# Patient Record
Sex: MALE | Race: BLACK OR AFRICAN AMERICAN | NOT HISPANIC OR LATINO | Employment: FULL TIME | URBAN - METROPOLITAN AREA
[De-identification: names, ages, dates, MRNs, and addresses within clinical notes are randomized per-mention and may not be internally consistent; named-entity substitution may affect disease eponyms.]

---

## 2017-02-01 ENCOUNTER — OFFICE VISIT (OUTPATIENT)
Dept: CARDIOLOGY | Facility: CLINIC | Age: 57
End: 2017-02-01

## 2017-02-01 VITALS
BODY MASS INDEX: 46.65 KG/M2 | WEIGHT: 315 LBS | HEART RATE: 59 BPM | HEIGHT: 69 IN | SYSTOLIC BLOOD PRESSURE: 143 MMHG | DIASTOLIC BLOOD PRESSURE: 94 MMHG

## 2017-02-01 DIAGNOSIS — I10 ESSENTIAL HYPERTENSION: Primary | ICD-10-CM

## 2017-02-01 DIAGNOSIS — Z79.899 HIGH RISK MEDICATION USE: Primary | ICD-10-CM

## 2017-02-01 PROCEDURE — 99213 OFFICE O/P EST LOW 20 MIN: CPT | Performed by: INTERNAL MEDICINE

## 2017-02-01 RX ORDER — HYDROCHLOROTHIAZIDE 25 MG/1
25 TABLET ORAL DAILY
Qty: 90 TABLET | Refills: 2 | Status: SHIPPED | OUTPATIENT
Start: 2017-02-01 | End: 2017-03-15 | Stop reason: SDUPTHER

## 2017-02-01 RX ORDER — LISINOPRIL 40 MG/1
40 TABLET ORAL DAILY
Qty: 90 TABLET | Refills: 3 | Status: SHIPPED | OUTPATIENT
Start: 2017-02-01 | End: 2017-03-15 | Stop reason: SDUPTHER

## 2017-02-01 RX ORDER — TRIAMTERENE AND HYDROCHLOROTHIAZIDE 37.5; 25 MG/1; MG/1
1 TABLET ORAL DAILY
Qty: 90 TABLET | Refills: 2 | Status: SHIPPED | OUTPATIENT
Start: 2017-02-01 | End: 2017-11-10 | Stop reason: SDUPTHER

## 2017-02-01 RX ORDER — VITAMIN B COMPLEX
1 CAPSULE ORAL DAILY
COMMUNITY
End: 2022-10-19

## 2017-02-01 NOTE — PROGRESS NOTES
Tyrell Guzmán  1960  [phone]  [phone]    02/01/2017    ROBRET Avila MD    Chief Complaint   Patient presents with   • Follow-up     edema, htn     PROBLEM LIST:  1. Bilateral lower extremity edema.  2. Abnormal EKG revealing poor anterior R-wave progression, inferior lateral T-wave inversion:  a. Reportedly normal stress test, 2002 -- data deficit.  b. A 2D echocardiogram, 04/28/2008:   Mild LVF with ejection fraction greater than 55%, trace mitral regurgitation, trace tricuspid regurgitation with normal RVSP.  c. Cardiolite GXT, 04/30/2008:  Normal ejection fraction of 66% with no inducible ischemia.  d. Echocardiogram, 9/26/2014: Normal LV function and wall motion. Minimally reduced exercise tolerance.  3. Hypertension.  4. Obesity.  5. Colon cancer:  a. Colectomy, 08/12/2010, Dr. Davis.  b. Chemotherapy x6 months.  6. Surgical history:  a. Vasectomy.  b. Colectomy.      No Known Allergies    Current Medications:      Current Outpatient Prescriptions:   •  B Complex Vitamins (VITAMIN B COMPLEX) capsule capsule, Take  by mouth Daily., Disp: , Rfl:   •  Lisinopril-Hydrochlorothiazide (ZESTORETIC PO), Take  by mouth Daily. Unsure of dosage, Disp: , Rfl:   •  Multiple Vitamin (MULTI VITAMIN PO), Take  by mouth Daily., Disp: , Rfl:     HPI    Mr. Guzmán presents today for follow up for hypertension and bilateral lower extremity edema. Since last visit, patient has been feeling well from a cardiac standpoint. He has been experiencing edema in his ankles. Patient denies chest pain, palpitations, shortness of breath, PND, orthopnea, dizziness, and syncope. He is currently only getting cardio done while at work, walking approximately 4 miles per day.  He has just started an attempt to lose weight.    The following portions of the patient's history were reviewed and updated as appropriate: allergies, current medications and problem list.    Pertinent positives as listed in the HPI.  All other  "systems reviewed are negative.    Vitals:    02/01/17 0948   BP: 143/94   BP Location: Left arm   Patient Position: Sitting   Pulse: 59   Weight: (!) 334 lb (152 kg)   Height: 69\" (175.3 cm)       General: Alert and oriented  Neck: Jugular venous pressure is within normal limits. Carotids have normal upstrokes without bruits.   Cardiovascular: Heart has a nondisplaced focal PMI. Regular rate and rhythm without murmur, gallop or rub.  Lungs: Clear without rales or wheezes. Equal expansion is noted.   Extremities: Bilateral lower extremity edema.  Skin: warm and dry.  Neurologic: nonfocal      Diagnostic Data:      Procedures    Assessment:      ICD-10-CM ICD-9-CM   1. Essential hypertension I10 401.9       Plan:    1. Recommend starting aerobic exercise 30 minutes 5 days a week outside of normal every day activities.  2. Call the pharmacy to determine what the dose of lisinopril HCT is.  I would like to increase that dose if possible.  3. Obtain labs from Dr. Swenson's office so that we can have his most recent BUN and creatinine and potassium.  4. Continue current medications.  5. F/up in 6 months or sooner if needed.  6. Restart Maxzide 37.5/25 one per day.  7. BMP in 1-2 weeks    Scribed for Suzan Ellis MD by Omar Carter. 2/1/2017  10:19 AM    I Suzan Ellis MD personally performed the services described in this documentation as scribed by the above individual in my presence, and it is both accurate and complete.          "

## 2017-03-15 RX ORDER — LISINOPRIL 40 MG/1
40 TABLET ORAL DAILY
Qty: 90 TABLET | Refills: 2 | Status: SHIPPED | OUTPATIENT
Start: 2017-03-15 | End: 2018-08-30

## 2017-03-15 RX ORDER — HYDROCHLOROTHIAZIDE 25 MG/1
25 TABLET ORAL DAILY
Qty: 90 TABLET | Refills: 2 | Status: SHIPPED | OUTPATIENT
Start: 2017-03-15 | End: 2017-12-10 | Stop reason: SDUPTHER

## 2017-08-23 ENCOUNTER — OFFICE VISIT (OUTPATIENT)
Dept: CARDIOLOGY | Facility: CLINIC | Age: 57
End: 2017-08-23

## 2017-08-23 VITALS
WEIGHT: 315 LBS | SYSTOLIC BLOOD PRESSURE: 122 MMHG | HEART RATE: 79 BPM | HEIGHT: 69 IN | BODY MASS INDEX: 46.65 KG/M2 | DIASTOLIC BLOOD PRESSURE: 68 MMHG

## 2017-08-23 DIAGNOSIS — R60.0 BILATERAL EDEMA OF LOWER EXTREMITY: ICD-10-CM

## 2017-08-23 DIAGNOSIS — I10 ESSENTIAL HYPERTENSION: Primary | ICD-10-CM

## 2017-08-23 PROCEDURE — 99213 OFFICE O/P EST LOW 20 MIN: CPT | Performed by: NURSE PRACTITIONER

## 2017-08-23 NOTE — PROGRESS NOTES
Tyrell Guzmán  1960  [phone]  [phone]    08/23/2017    ROBERT Avila MD    Chief Complaint   Patient presents with   • Hypertension     Problem List  1. Bilateral lower extremity edema.  2. Abnormal EKG revealing poor anterior R-wave progression, inferior lateral T-wave inversion:  a. Reportedly normal stress test, 2002 -- data deficit.  b. A 2D echocardiogram, 04/28/2008:   Mild LVF with ejection fraction greater than 55%, trace mitral regurgitation, trace tricuspid regurgitation with normal RVSP.  c. Cardiolite GXT, 04/30/2008:  Normal ejection fraction of 66% with no inducible ischemia.  d. Echocardiogram, 9/26/2014: Normal LV function and wall motion. Minimally reduced exercise tolerance.  3. Hypertension.  4. Obesity.  5. Colon cancer:  a. Colectomy, 08/12/2010, Dr. Davis.  b. Chemotherapy x6 months.  6. Surgical history:  a. Vasectomy.  b. Colectomy.     No Known Allergies    Current Medications    Current Outpatient Prescriptions:   •  B Complex Vitamins (VITAMIN B COMPLEX) capsule capsule, Take  by mouth Daily., Disp: , Rfl:   •  hydrochlorothiazide (HYDRODIURIL) 25 MG tablet, Take 1 tablet by mouth Daily., Disp: 90 tablet, Rfl: 2  •  lisinopril (PRINIVIL,ZESTRIL) 40 MG tablet, Take 1 tablet by mouth Daily., Disp: 90 tablet, Rfl: 2  •  Multiple Vitamin (MULTI VITAMIN PO), Take  by mouth Daily., Disp: , Rfl:   •  triamterene-hydrochlorothiazide (MAXZIDE-25) 37.5-25 MG per tablet, Take 1 tablet by mouth Daily., Disp: 90 tablet, Rfl: 2    History of Present Illness   HPI  Patient is a pleasant 57-year-old male who presents today for his 6 month follow-up for her hypertension and bilateral lower extremity edema.  He states that since his last visit he has been monitoring his blood pressure more closely and it tends to stay in the 125-135 range systolic.  He is being more active in exercise with weight lifting and cardio.  He states that he walks 2-3 times a week without becoming  "symptomatic.  He watches his sodium intake very closely thinks this is helped quite a bit and helping to maintain as little fluid as possible in his lower extremities as well as maintaining his blood pressure.  He does still complain of some minimal bilateral lower extremity edema but states that this is not bothersome to him.  He denies having any current chest pain, shortness of breath, dyspnea on exertion, fatigue, palpitations, dizziness, and syncope.  His most recent labs were checked in June with Dr. Avila.  States that his kidney function was reportedly \"normal\".  I am going to obtain these records so we can verify that both kidney function and potassium levels are appropriate.    The following portions of the patient's history were reviewed and updated as appropriate: allergies, current medications and problem list.    Pertinent positives as listed in the HPI.  All other systems reviewed are negative.    Vitals:    08/23/17 1608   BP: 122/68   BP Location: Right arm   Patient Position: Sitting   Pulse: 79   Weight: (!) 323 lb 6.4 oz (147 kg)   Height: 69\" (175.3 cm)       Physical Exam  GENERAL: well-developed, well-nourished; in no acute distress.   NECK:  There is no jugular venous distention at 30°.  Carotid upstrokes are 2+ and  symmetrical without bruits.   LUNGS: Clear to auscultation bilaterally without wheezing, rhonchi, or rales noted.   CARDIOVASCULAR: The heart has a regular rate with a normal S1 and S2. There is no murmur, gallop, rub, or click appreciated. The PMI is nondisplaced.   ABDOMEN: Soft and nontender  NEUROLOGICAL: Nonfocal; Alert and oriented  PERIPHERAL VASCULAR:  Posterior tibial and dorsalis pedis pulses are 2+ and symmetrical. There is trace peripheral edema.   MUSCULOSKELETAL:  Normal ROM  SKIN:  Warm and dry  PSYCHIATRIC: normal mood and affect; behavior appropriate    Diagnostic Data:  Procedures    Assessment:      ICD-10-CM ICD-9-CM   1. Essential hypertension I10 401.9   2. " Bilateral edema of lower extremity R60.0 782.3       Plan:  1.  Recent labs in June 2017 with Dr Avila; normal per patient  2.  Continue all medications as noted above  3.  F/up in 12 months or sooner if needed.      Seen independently by JER Lynn on August 23, 2017 at 1610

## 2017-10-19 ENCOUNTER — TELEPHONE (OUTPATIENT)
Dept: CARDIOLOGY | Facility: CLINIC | Age: 57
End: 2017-10-19

## 2017-10-19 NOTE — TELEPHONE ENCOUNTER
"PT complaining of dry cough with lisinopril- his BP/'s/70's- HR he doesn't remember any readings but states that he can remember that is \"normal\"- can we switch to something else please?   "

## 2017-10-20 RX ORDER — LOSARTAN POTASSIUM 100 MG/1
100 TABLET ORAL DAILY
Qty: 90 TABLET | Refills: 2 | Status: SHIPPED | OUTPATIENT
Start: 2017-10-20 | End: 2018-06-27 | Stop reason: SDUPTHER

## 2017-10-23 ENCOUNTER — TRANSCRIBE ORDERS (OUTPATIENT)
Dept: ADMINISTRATIVE | Facility: HOSPITAL | Age: 57
End: 2017-10-23

## 2017-10-23 ENCOUNTER — HOSPITAL ENCOUNTER (OUTPATIENT)
Dept: GENERAL RADIOLOGY | Facility: HOSPITAL | Age: 57
Discharge: HOME | End: 2017-10-23
Attending: FAMILY MEDICINE | Admitting: FAMILY MEDICINE

## 2017-10-23 DIAGNOSIS — R05.9 COUGH: ICD-10-CM

## 2017-10-23 DIAGNOSIS — R07.9 CHEST PAIN, UNSPECIFIED TYPE: Primary | ICD-10-CM

## 2017-10-23 PROCEDURE — 71020 HC CHEST PA AND LATERAL: CPT

## 2017-10-30 ENCOUNTER — TRANSCRIBE ORDERS (OUTPATIENT)
Dept: ADMINISTRATIVE | Facility: HOSPITAL | Age: 57
End: 2017-10-30

## 2017-10-30 DIAGNOSIS — R07.89 ATYPICAL CHEST PAIN: Primary | ICD-10-CM

## 2017-11-09 ENCOUNTER — OFFICE VISIT (OUTPATIENT)
Dept: NEUROLOGY | Facility: CLINIC | Age: 57
End: 2017-11-09

## 2017-11-09 VITALS
HEIGHT: 69 IN | SYSTOLIC BLOOD PRESSURE: 132 MMHG | HEART RATE: 70 BPM | RESPIRATION RATE: 18 BRPM | BODY MASS INDEX: 46.65 KG/M2 | WEIGHT: 315 LBS | DIASTOLIC BLOOD PRESSURE: 78 MMHG

## 2017-11-09 DIAGNOSIS — G47.33 OBSTRUCTIVE APNEA: Primary | ICD-10-CM

## 2017-11-09 PROCEDURE — 99212 OFFICE O/P EST SF 10 MIN: CPT | Performed by: PSYCHIATRY & NEUROLOGY

## 2017-11-09 NOTE — PROGRESS NOTES
"Subjective:   Chief Complaint   Patient presents with   • Sleep Apnea       Patient ID: Tyrell Guzmán is a 57 y.o. male.    History of Present Illness     57 y.o. male with DIPIKA returns in follow up.  Last visit on 11/10/16 renewed mask and supplies.       Last changed mask in March 2017.  Changed to FFM and reduced leak.  Improved sleep quality and daytime alertness. Lost 17 lbs in last 9 months.       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     The following portions of the patient's history were reviewed and updated as appropriate: allergies, current medications, past medical history, past surgical history and problem list.    Review of Systems   Constitutional: Negative for activity change, fatigue and unexpected weight change.   HENT: Negative for facial swelling, hearing loss, tinnitus, trouble swallowing and voice change.    Eyes: Negative for photophobia, pain and visual disturbance.   Respiratory: Negative for apnea, cough and choking.    Cardiovascular: Negative for chest pain.   Gastrointestinal: Negative for constipation, nausea and vomiting.   Endocrine: Negative for cold intolerance.   Genitourinary: Negative for difficulty urinating, frequency and urgency.   Musculoskeletal: Negative for arthralgias, back pain, gait problem, myalgias, neck pain and neck stiffness.   Skin: Negative for rash.   Allergic/Immunologic: Negative for immunocompromised state.   Neurological: Negative for dizziness, tremors, seizures, syncope, facial asymmetry, speech difficulty, weakness, light-headedness, numbness and headaches.   Hematological: Negative for adenopathy.   Psychiatric/Behavioral: Positive for sleep disturbance. Negative for confusion, decreased concentration and hallucinations. The patient is not nervous/anxious.         Objective:  Vitals:    11/09/17 0824   BP: 132/78   Pulse: 70   Resp: 18   Weight: (!) 317 lb (144 kg)   Height: 69\" " (175.3 cm)       Neurologic Exam     Mental Status   Oriented to person, place, and time.   Speech: speech is normal   Level of consciousness: alert  Knowledge: good and consistent with education.   Normal comprehension.     Cranial Nerves   Cranial nerves II through XII intact.     CN II   Visual fields full to confrontation.   Visual acuity: normal  Right visual field deficit: none  Left visual field deficit: none     CN III, IV, VI   Pupils are equal, round, and reactive to light.  Extraocular motions are normal.   Nystagmus: none   Diplopia: none  Ophthalmoparesis: none  Upgaze: normal  Downgaze: normal  Conjugate gaze: present    CN V   Facial sensation intact.   Right corneal reflex: normal  Left corneal reflex: normal    CN VII   Right facial weakness: none  Left facial weakness: none    CN VIII   Hearing: intact    CN IX, X   Palate: symmetric  Right gag reflex: normal  Left gag reflex: normal    CN XI   Right sternocleidomastoid strength: normal  Left sternocleidomastoid strength: normal    CN XII   Tongue: not atrophic  Fasciculations: absent  Tongue deviation: none    Motor Exam   Muscle bulk: normal  Overall muscle tone: normal    Strength   Strength 5/5 throughout.     Sensory Exam   Light touch normal.     Gait, Coordination, and Reflexes     Gait  Gait: normal    Tremor   Resting tremor: absent  Intention tremor: absent  Action tremor: absent    Reflexes   Reflexes 2+ except as noted.       Physical Exam   Constitutional: He is oriented to person, place, and time.   Eyes: EOM are normal. Pupils are equal, round, and reactive to light.   Neurological: He is oriented to person, place, and time. He has normal strength. Gait normal.   Psychiatric: His speech is normal.       Assessment/Plan:       Problems Addressed this Visit        Respiratory    Obstructive apnea - Primary     New mask and supplies sent to [facility]     CPAP 12 cm H20

## 2017-11-10 RX ORDER — TRIAMTERENE AND HYDROCHLOROTHIAZIDE 37.5; 25 MG/1; MG/1
1 TABLET ORAL DAILY
Qty: 90 TABLET | Refills: 2 | Status: SHIPPED | OUTPATIENT
Start: 2017-11-10 | End: 2017-12-11 | Stop reason: SDUPTHER

## 2017-11-27 ENCOUNTER — HOSPITAL ENCOUNTER (OUTPATIENT)
Dept: CARDIOLOGY | Facility: HOSPITAL | Age: 57
Discharge: HOME | End: 2017-11-27
Attending: FAMILY MEDICINE | Admitting: FAMILY MEDICINE

## 2017-11-27 VITALS — HEIGHT: 69 IN | BODY MASS INDEX: 46.65 KG/M2 | WEIGHT: 315 LBS

## 2017-11-27 DIAGNOSIS — R07.89 ATYPICAL CHEST PAIN: ICD-10-CM

## 2017-11-27 LAB
BH CV STRESS BP STAGE 1: NORMAL
BH CV STRESS BP STAGE 2: NORMAL
BH CV STRESS BP STAGE 3: NORMAL
BH CV STRESS DURATION MIN STAGE 1: 3
BH CV STRESS DURATION MIN STAGE 2: 3
BH CV STRESS DURATION MIN STAGE 3: 4
BH CV STRESS DURATION SEC STAGE 1: 0
BH CV STRESS DURATION SEC STAGE 2: 0
BH CV STRESS DURATION SEC STAGE 3: 16
BH CV STRESS GRADE STAGE 1: 10
BH CV STRESS GRADE STAGE 2: 12
BH CV STRESS GRADE STAGE 3: 14
BH CV STRESS HR STAGE 1: 100
BH CV STRESS HR STAGE 2: 123
BH CV STRESS HR STAGE 3: 144
BH CV STRESS METS STAGE 1: 5
BH CV STRESS METS STAGE 2: 7.5
BH CV STRESS METS STAGE 3: 10
BH CV STRESS PROTOCOL 1: NORMAL
BH CV STRESS RECOVERY BP: NORMAL MMHG
BH CV STRESS RECOVERY HR: 89 BPM
BH CV STRESS SPEED STAGE 1: 1.7
BH CV STRESS SPEED STAGE 2: 2.5
BH CV STRESS SPEED STAGE 3: 3.4
BH CV STRESS STAGE 1: 1
BH CV STRESS STAGE 2: 2
BH CV STRESS STAGE 3: 3
LV EF 2D ECHO EST: 60 %
MAXIMAL PREDICTED HEART RATE: 165 BPM
PERCENT MAX PREDICTED HR: 92.73 %
STRESS BASELINE BP: NORMAL MMHG
STRESS BASELINE HR: 64 BPM
STRESS PERCENT HR: 109 %
STRESS POST ESTIMATED WORKLOAD: 10.4 METS
STRESS POST EXERCISE DUR MIN: 10 MIN
STRESS POST EXERCISE DUR SEC: 16 SEC
STRESS POST PEAK BP: NORMAL MMHG
STRESS POST PEAK HR: 153 BPM
STRESS TARGET HR: 140 BPM

## 2017-11-27 PROCEDURE — 93320 DOPPLER ECHO COMPLETE: CPT

## 2017-11-27 PROCEDURE — 93325 DOPPLER ECHO COLOR FLOW MAPG: CPT

## 2017-11-27 PROCEDURE — 93350 STRESS TTE ONLY: CPT

## 2017-11-27 PROCEDURE — 93350 STRESS TTE ONLY: CPT | Performed by: INTERNAL MEDICINE

## 2017-11-27 PROCEDURE — 93352 ADMIN ECG CONTRAST AGENT: CPT | Performed by: INTERNAL MEDICINE

## 2017-11-27 PROCEDURE — 25010000002 SULFUR HEXAFLUORIDE MICROSPH 60.7-25 MG RECONSTITUTED SUSPENSION: Performed by: FAMILY MEDICINE

## 2017-11-27 PROCEDURE — 93017 CV STRESS TEST TRACING ONLY: CPT

## 2017-11-27 PROCEDURE — 93018 CV STRESS TEST I&R ONLY: CPT | Performed by: INTERNAL MEDICINE

## 2017-11-27 RX ADMIN — SULFUR HEXAFLUORIDE 8 ML: KIT at 08:45

## 2017-12-11 RX ORDER — HYDROCHLOROTHIAZIDE 25 MG/1
TABLET ORAL
Qty: 90 TABLET | Refills: 3 | Status: SHIPPED | OUTPATIENT
Start: 2017-12-11 | End: 2018-12-23 | Stop reason: SDUPTHER

## 2018-06-27 RX ORDER — LOSARTAN POTASSIUM 100 MG/1
TABLET ORAL
Qty: 90 TABLET | Refills: 0 | Status: SHIPPED | OUTPATIENT
Start: 2018-06-27 | End: 2018-09-25 | Stop reason: SDUPTHER

## 2018-07-19 ENCOUNTER — TELEPHONE (OUTPATIENT)
Dept: ONCOLOGY | Facility: CLINIC | Age: 58
End: 2018-07-19

## 2018-07-19 NOTE — TELEPHONE ENCOUNTER
----- Message from Aisha Haskins sent at 7/18/2018  3:34 PM EDT -----  Regarding: GERHARD-ORDER  Contact: [phone]  Ada patient's wife called it's been awhile since Dr. Whyte has seen patient, but his stomach is really swollen and wants to know if Dr. Whyte could order a total body scan? Please call.

## 2018-07-19 NOTE — TELEPHONE ENCOUNTER
LM on patient vm.  Dr. Whyte wants to see patient.  He will determine after examining any additional orders.  Appt made for 7/24/18 at 11am.

## 2018-07-25 ENCOUNTER — LAB (OUTPATIENT)
Dept: LAB | Facility: HOSPITAL | Age: 58
End: 2018-07-25

## 2018-07-25 ENCOUNTER — OFFICE VISIT (OUTPATIENT)
Dept: ONCOLOGY | Facility: CLINIC | Age: 58
End: 2018-07-25

## 2018-07-25 VITALS
HEIGHT: 69 IN | RESPIRATION RATE: 18 BRPM | BODY MASS INDEX: 46.65 KG/M2 | DIASTOLIC BLOOD PRESSURE: 73 MMHG | WEIGHT: 315 LBS | SYSTOLIC BLOOD PRESSURE: 156 MMHG | TEMPERATURE: 97.7 F | HEART RATE: 61 BPM

## 2018-07-25 DIAGNOSIS — R19.00 SWELLING ABDOMEN: Primary | ICD-10-CM

## 2018-07-25 DIAGNOSIS — Z85.038 HISTORY OF COLON CANCER: ICD-10-CM

## 2018-07-25 DIAGNOSIS — R19.00 SWELLING ABDOMEN: ICD-10-CM

## 2018-07-25 DIAGNOSIS — Z85.038 HISTORY OF COLON CANCER: Primary | ICD-10-CM

## 2018-07-25 LAB
ALBUMIN SERPL-MCNC: 4.33 G/DL (ref 3.2–4.8)
ALBUMIN/GLOB SERPL: 1.6 G/DL (ref 1.5–2.5)
ALP SERPL-CCNC: 79 U/L (ref 25–100)
ALT SERPL W P-5'-P-CCNC: 31 U/L (ref 7–40)
ANION GAP SERPL CALCULATED.3IONS-SCNC: 3 MMOL/L (ref 3–11)
AST SERPL-CCNC: 23 U/L (ref 0–33)
BILIRUB SERPL-MCNC: 0.6 MG/DL (ref 0.3–1.2)
BUN BLD-MCNC: 14 MG/DL (ref 9–23)
BUN/CREAT SERPL: 12.4 (ref 7–25)
CALCIUM SPEC-SCNC: 9.5 MG/DL (ref 8.7–10.4)
CEA SERPL-MCNC: 2 NG/ML (ref 0–2.5)
CHLORIDE SERPL-SCNC: 102 MMOL/L (ref 99–109)
CO2 SERPL-SCNC: 33 MMOL/L (ref 20–31)
CREAT BLD-MCNC: 1.13 MG/DL (ref 0.6–1.3)
ERYTHROCYTE [DISTWIDTH] IN BLOOD BY AUTOMATED COUNT: 13.3 % (ref 11.3–14.5)
GFR SERPL CREATININE-BSD FRML MDRD: 81 ML/MIN/1.73
GLOBULIN UR ELPH-MCNC: 2.8 GM/DL
GLUCOSE BLD-MCNC: 113 MG/DL (ref 70–100)
HCT VFR BLD AUTO: 48.3 % (ref 38.9–50.9)
HGB BLD-MCNC: 15.3 G/DL (ref 13.1–17.5)
LYMPHOCYTES # BLD AUTO: 2.1 10*3/MM3 (ref 0.6–4.8)
LYMPHOCYTES NFR BLD AUTO: 26.5 % (ref 24–44)
MCH RBC QN AUTO: 26.9 PG (ref 27–31)
MCHC RBC AUTO-ENTMCNC: 31.7 G/DL (ref 32–36)
MCV RBC AUTO: 84.6 FL (ref 80–99)
MONOCYTES # BLD AUTO: 0.3 10*3/MM3 (ref 0–1)
MONOCYTES NFR BLD AUTO: 3.8 % (ref 0–12)
NEUTROPHILS # BLD AUTO: 5.4 10*3/MM3 (ref 1.5–8.3)
NEUTROPHILS NFR BLD AUTO: 69.7 % (ref 41–71)
PLATELET # BLD AUTO: 275 10*3/MM3 (ref 150–450)
PMV BLD AUTO: 7.6 FL (ref 6–12)
POTASSIUM BLD-SCNC: 4.6 MMOL/L (ref 3.5–5.5)
PROT SERPL-MCNC: 7.1 G/DL (ref 5.7–8.2)
RBC # BLD AUTO: 5.7 10*6/MM3 (ref 4.2–5.76)
SODIUM BLD-SCNC: 138 MMOL/L (ref 132–146)
WBC NRBC COR # BLD: 7.8 10*3/MM3 (ref 3.5–10.8)

## 2018-07-25 PROCEDURE — 85025 COMPLETE CBC W/AUTO DIFF WBC: CPT

## 2018-07-25 PROCEDURE — 99214 OFFICE O/P EST MOD 30 MIN: CPT | Performed by: INTERNAL MEDICINE

## 2018-07-25 PROCEDURE — 36415 COLL VENOUS BLD VENIPUNCTURE: CPT

## 2018-07-25 PROCEDURE — 80053 COMPREHEN METABOLIC PANEL: CPT

## 2018-07-25 PROCEDURE — 82378 CARCINOEMBRYONIC ANTIGEN: CPT

## 2018-07-25 NOTE — PROGRESS NOTES
Chief Complaint   History of colon cancer-stage III.  Diagnosed and status post laparoscopic partial colectomy August 12, 2010, followed by adjuvant FOLFOX chemotherapy    Long-term overweight condition    PROBLEM LIST   Patient Active Problem List   Diagnosis   • Acute bronchitis   • Obstructive apnea   • Essential hypertension   • Bilateral lower extremity edema.   • Abnormal EKG   • Obesity   • History of colon cancer       HISTORY OF PRESENT ILLNESS:   First time back in a year and a half or so.  He is now 8 years out from his diagnosis of colon cancer.  He's had a several month history that's somewhat ill-defined in terms of duration of the change in bowel habits as well as abdominal bloating.  The bloating seems to be more noticeable towards the end of the day.  His bowel function has been reasonably stable although with some increased bran in his diet, he seems to be having a bit more in the way of loose stool.  His last colonoscopy was in 2016 and he had some benign polyps snared.  He's had no fevers chills or night sweats.  He's had no nausea or vomiting.  He has had no episodes of dark urine or of any scleral jaundice.    Past Medical History, Past Surgical History, Social History, Family History have been reviewed and are without significant changes except as mentioned.      Medications:      Current Outpatient Prescriptions:   •  B Complex Vitamins (VITAMIN B COMPLEX) capsule capsule, Take  by mouth Daily., Disp: , Rfl:   •  hydrochlorothiazide (HYDRODIURIL) 25 MG tablet, TAKE 1 TABLET DAILY, Disp: 90 tablet, Rfl: 3  •  lisinopril (PRINIVIL,ZESTRIL) 40 MG tablet, Take 1 tablet by mouth Daily., Disp: 90 tablet, Rfl: 2  •  losartan (COZAAR) 100 MG tablet, TAKE 1 TABLET DAILY, Disp: 90 tablet, Rfl: 0  •  Multiple Vitamin (MULTI VITAMIN PO), Take  by mouth Daily., Disp: , Rfl:     ALLERGIES:    Allergies   Allergen Reactions   • Lisinopril Cough       ROS:  Review of Systems   Constitutional: Negative for  "activity change and appetite change.        Working regularly.  Overall vigor is good.   HENT: Negative for mouth sores, sinus pressure and voice change.    Eyes: Negative for visual disturbance.   Respiratory: Negative for shortness of breath.    Cardiovascular: Negative for chest pain.        Chronic lower extremity edema issues pretty well controlled with [place]avian use of compression stockings   Gastrointestinal: Negative for abdominal pain and vomiting.        GI symptoms defined and described in the above history of present illness   Genitourinary: Negative for dysuria.   Musculoskeletal: Negative for arthralgias and myalgias.   Skin: Negative for color change.   Neurological: Negative for dizziness, syncope and headaches.   Hematological: Negative for adenopathy.   Psychiatric/Behavioral: Negative for confusion, sleep disturbance and suicidal ideas. The patient is not nervous/anxious.            Objective:    /73 Comment: Left Wrist  Pulse 61   Temp 97.7 °F (36.5 °C) (Temporal Artery )   Resp 18   Ht 175.3 cm (69\")   Wt (!) 149 kg (329 lb)   BMI 48.58 kg/m²     Physical Exam   Constitutional:   Henrique looks well.  He is in no distress.   Cardiovascular:   Chronic lower extremity edema minimal at this point.  Compression stockings present.   Abdominal:   Soft, a bit protuberant but nontender.  Bowel sounds are normal.  No masses appreciable.              RECENT LABS:  Hematology WBC   Date Value Ref Range Status   10/13/2015 6.1 3.50 - 10.80 K/mcL Final     Hemoglobin   Date Value Ref Range Status   10/13/2015 15.0 13.1 - 17.5 g/dL Final     Hematocrit   Date Value Ref Range Status   05/26/2016 42.6 38.9 - 50.9 % Final     MCV   Date Value Ref Range Status   10/13/2015 84.8 80.0 - 99.0 fL Final     RDW   Date Value Ref Range Status   10/13/2015 13.2 11.3 - 14.5 % Final     MPV   Date Value Ref Range Status   10/13/2015 7.7 fL Final     Platelets   Date Value Ref Range Status   10/13/2015 235 150 - " 450 K/mcL Final     Neutrophils Absolute   Date Value Ref Range Status   10/13/2015 4.2 1.50 - 8.30 K/mcL Final     Lymphocytes Absolute   Date Value Ref Range Status   10/13/2015 1.7 0.60 - 4.80 K/mcL Final     Monocytes Absolute   Date Value Ref Range Status   10/13/2015 0.2 0.00 - 1.00 K/mcL Final     Eosinophils Absolute   Date Value Ref Range Status   10/08/2014 0.28 0.10 - 0.30 K/mcL Final     Basophils Absolute   Date Value Ref Range Status   10/08/2014 0.06 0.00 - 0.20 K/mcL Final       Glucose   Date Value Ref Range Status   10/13/2015 137 (H) 70 - 100 mg/dL Final     Sodium   Date Value Ref Range Status   10/13/2015 140 132 - 146 mmol/L Final     Potassium   Date Value Ref Range Status   05/26/2016 3.8 3.5 - 5.5 mmol/L Final     CO2   Date Value Ref Range Status   10/13/2015 25 20 - 31 mmol/L Final     Chloride   Date Value Ref Range Status   10/13/2015 102 99 - 109 mmol/L Final     Anion Gap   Date Value Ref Range Status   10/13/2015 13 (H) 3 - 11 mmol/L Final     Creatinine   Date Value Ref Range Status   10/13/2015 1.2 0.6 - 1.3 mg/dL Final     BUN   Date Value Ref Range Status   10/13/2015 18 9 - 23 mg/dL Final     Calcium   Date Value Ref Range Status   10/13/2015 9.5 8.7 - 10.4 mg/dL Final     Alkaline Phosphatase   Date Value Ref Range Status   10/13/2015 94 25 - 100 Units/L Final     Total Protein   Date Value Ref Range Status   10/13/2015 7.1 5.7 - 8.2 g/dL Final     ALT (SGPT)   Date Value Ref Range Status   10/13/2015 32 7 - 40 Units/L Final     AST (SGOT)   Date Value Ref Range Status   10/13/2015 29 0 - 33 Units/L Final     Total Bilirubin   Date Value Ref Range Status   10/13/2015 0.7 0.3 - 1.2 mg/dL Final     Albumin   Date Value Ref Range Status   10/13/2015 4.3 3.2 - 4.8 g/dL Final          Perf Status:0    Assessment/Plan    Diagnoses and all orders for this visit:    History of colon cancer    I think the patient is doing well.  Given his history of colon cancer however, I think it's  important to get a CT scan to make sure things are okay.  I'll get that in the next week or 2.  I'll also check a CBC CMP and a CEA on him.  All the above was discussed with the patient and his wife assuming things are okay I will not schedule him back      Constantin Whyte MD , 7/25/2018    CC:

## 2018-07-26 ENCOUNTER — TELEPHONE (OUTPATIENT)
Dept: ONCOLOGY | Facility: CLINIC | Age: 58
End: 2018-07-26

## 2018-08-02 ENCOUNTER — HOSPITAL ENCOUNTER (OUTPATIENT)
Dept: CT IMAGING | Facility: HOSPITAL | Age: 58
Discharge: HOME | End: 2018-08-02
Attending: INTERNAL MEDICINE | Admitting: INTERNAL MEDICINE

## 2018-08-02 ENCOUNTER — TELEPHONE (OUTPATIENT)
Dept: ONCOLOGY | Facility: CLINIC | Age: 58
End: 2018-08-02

## 2018-08-02 DIAGNOSIS — Z85.038 HISTORY OF COLON CANCER: ICD-10-CM

## 2018-08-02 DIAGNOSIS — R19.00 SWELLING ABDOMEN: ICD-10-CM

## 2018-08-02 PROCEDURE — 74178 CT ABD&PLV WO CNTR FLWD CNTR: CPT

## 2018-08-02 PROCEDURE — 25010000002 IOPAMIDOL 61 % SOLUTION: Performed by: INTERNAL MEDICINE

## 2018-08-02 RX ADMIN — IOPAMIDOL 100 ML: 612 INJECTION, SOLUTION INTRAVENOUS at 09:30

## 2018-08-02 RX ADMIN — BARIUM SULFATE 450 ML: 21 SUSPENSION ORAL at 09:30

## 2018-08-02 NOTE — TELEPHONE ENCOUNTER
Per Dr. Whyte, scans look good.  No sign of cancer.  No fluid build up. Patient stated understanding.

## 2018-08-16 RX ORDER — TRIAMTERENE AND HYDROCHLOROTHIAZIDE 37.5; 25 MG/1; MG/1
1 TABLET ORAL DAILY
Qty: 90 TABLET | Refills: 0 | Status: SHIPPED | OUTPATIENT
Start: 2018-08-16 | End: 2018-08-30 | Stop reason: DRUGHIGH

## 2018-08-30 ENCOUNTER — OFFICE VISIT (OUTPATIENT)
Dept: CARDIOLOGY | Facility: CLINIC | Age: 58
End: 2018-08-30

## 2018-08-30 VITALS
WEIGHT: 315 LBS | HEIGHT: 69 IN | DIASTOLIC BLOOD PRESSURE: 74 MMHG | HEART RATE: 68 BPM | BODY MASS INDEX: 46.65 KG/M2 | SYSTOLIC BLOOD PRESSURE: 162 MMHG

## 2018-08-30 DIAGNOSIS — Z79.899 HIGH RISK MEDICATION USE: Primary | ICD-10-CM

## 2018-08-30 DIAGNOSIS — I10 ESSENTIAL HYPERTENSION: Primary | ICD-10-CM

## 2018-08-30 DIAGNOSIS — R60.0 LOWER EXTREMITY EDEMA: ICD-10-CM

## 2018-08-30 PROCEDURE — 99213 OFFICE O/P EST LOW 20 MIN: CPT | Performed by: INTERNAL MEDICINE

## 2018-08-30 RX ORDER — TRIAMTERENE AND HYDROCHLOROTHIAZIDE 75; 50 MG/1; MG/1
1 TABLET ORAL DAILY
Qty: 90 TABLET | Refills: 0 | Status: SHIPPED | OUTPATIENT
Start: 2018-08-30 | End: 2018-12-13 | Stop reason: SDUPTHER

## 2018-09-25 RX ORDER — LOSARTAN POTASSIUM 100 MG/1
TABLET ORAL
Qty: 90 TABLET | Refills: 2 | Status: SHIPPED | OUTPATIENT
Start: 2018-09-25 | End: 2019-07-01 | Stop reason: SDUPTHER

## 2018-10-05 ENCOUNTER — LAB (OUTPATIENT)
Dept: LAB | Facility: HOSPITAL | Age: 58
End: 2018-10-05

## 2018-10-05 DIAGNOSIS — Z79.899 HIGH RISK MEDICATION USE: ICD-10-CM

## 2018-10-05 LAB
ANION GAP SERPL CALCULATED.3IONS-SCNC: 6 MMOL/L (ref 3–11)
BUN BLD-MCNC: 19 MG/DL (ref 9–23)
BUN/CREAT SERPL: 15.8 (ref 7–25)
CALCIUM SPEC-SCNC: 9.1 MG/DL (ref 8.7–10.4)
CHLORIDE SERPL-SCNC: 102 MMOL/L (ref 99–109)
CO2 SERPL-SCNC: 29 MMOL/L (ref 20–31)
CREAT BLD-MCNC: 1.2 MG/DL (ref 0.6–1.3)
GFR SERPL CREATININE-BSD FRML MDRD: 76 ML/MIN/1.73
GLUCOSE BLD-MCNC: 144 MG/DL (ref 70–100)
POTASSIUM BLD-SCNC: 3.8 MMOL/L (ref 3.5–5.5)
SODIUM BLD-SCNC: 137 MMOL/L (ref 132–146)

## 2018-10-05 PROCEDURE — 80048 BASIC METABOLIC PNL TOTAL CA: CPT

## 2018-11-12 ENCOUNTER — OFFICE VISIT (OUTPATIENT)
Dept: NEUROLOGY | Facility: CLINIC | Age: 58
End: 2018-11-12

## 2018-11-12 VITALS
BODY MASS INDEX: 46.65 KG/M2 | DIASTOLIC BLOOD PRESSURE: 72 MMHG | WEIGHT: 315 LBS | HEART RATE: 76 BPM | SYSTOLIC BLOOD PRESSURE: 146 MMHG | RESPIRATION RATE: 18 BRPM | HEIGHT: 69 IN

## 2018-11-12 DIAGNOSIS — G47.33 OBSTRUCTIVE APNEA: Primary | ICD-10-CM

## 2018-11-12 PROCEDURE — 99212 OFFICE O/P EST SF 10 MIN: CPT | Performed by: PSYCHIATRY & NEUROLOGY

## 2018-11-12 NOTE — PROGRESS NOTES
"Subjective:   Chief Complaint   Patient presents with   • Sleep Apnea       Patient ID: Tyrell Guzmán is a 58 y.o. male.    History of Present Illness     58 y.o. male with DIPIKA returns in follow up.  Last visit on 11/9/17 renewed mask and supplies.       Compliant with CPAP and receiving benefit.      Averaging 7 to 8 hours a night using CPAP 12 cm H20.  Restorative sleep with CPAP.     DME [facility]     The following portions of the patient's history were reviewed and updated as appropriate: allergies, current medications, past medical history, past surgical history and problem list.    Review of Systems   Constitutional: Negative for activity change and unexpected weight change.   HENT: Negative for facial swelling, hearing loss, tinnitus, trouble swallowing and voice change.    Eyes: Negative for photophobia, pain and visual disturbance.   Respiratory: Negative for apnea and choking.    Gastrointestinal: Negative for constipation.   Endocrine: Negative for cold intolerance.   Genitourinary: Negative for difficulty urinating, frequency and urgency.   Musculoskeletal: Negative for back pain, gait problem and neck stiffness.   Allergic/Immunologic: Negative for immunocompromised state.   Neurological: Negative for dizziness, tremors, seizures, syncope, facial asymmetry, speech difficulty and light-headedness.   Hematological: Negative for adenopathy.   Psychiatric/Behavioral: Positive for sleep disturbance. Negative for confusion, decreased concentration and hallucinations. The patient is not nervous/anxious.         Objective:  Vitals:    11/12/18 0823   BP: 146/72   BP Location: Right arm   Patient Position: Sitting   Cuff Size: Adult   Pulse: 76   Resp: 18   Weight: (!) 150 kg (330 lb)   Height: 175.3 cm (69\")       Neurologic Exam     Mental Status   Oriented to person, place, and time.   Speech: speech is normal   Level of consciousness: alert  Knowledge: good and consistent with education. "   Normal comprehension.     Cranial Nerves   Cranial nerves II through XII intact.     CN II   Visual fields full to confrontation.   Visual acuity: normal  Right visual field deficit: none  Left visual field deficit: none     CN III, IV, VI   Pupils are equal, round, and reactive to light.  Extraocular motions are normal.   Nystagmus: none   Diplopia: none  Ophthalmoparesis: none  Upgaze: normal  Downgaze: normal  Conjugate gaze: present    CN V   Facial sensation intact.   Right corneal reflex: normal  Left corneal reflex: normal    CN VII   Right facial weakness: none  Left facial weakness: none    CN VIII   Hearing: intact    CN IX, X   Palate: symmetric  Right gag reflex: normal  Left gag reflex: normal    CN XI   Right sternocleidomastoid strength: normal  Left sternocleidomastoid strength: normal    CN XII   Tongue: not atrophic  Fasciculations: absent  Tongue deviation: none    Motor Exam   Muscle bulk: normal  Overall muscle tone: normal    Strength   Strength 5/5 throughout.     Sensory Exam   Light touch normal.     Gait, Coordination, and Reflexes     Gait  Gait: normal    Tremor   Resting tremor: absent  Intention tremor: absent  Action tremor: absent    Reflexes   Reflexes 2+ except as noted.       Physical Exam   Constitutional: He is oriented to person, place, and time.   Eyes: EOM are normal. Pupils are equal, round, and reactive to light.   Neurological: He is oriented to person, place, and time. He has normal strength. Gait normal.   Psychiatric: His speech is normal.       Assessment/Plan:       Problems Addressed this Visit        Respiratory    Obstructive apnea - Primary     Renew CPAP mask and supplies

## 2018-12-14 RX ORDER — TRIAMTERENE AND HYDROCHLOROTHIAZIDE 75; 50 MG/1; MG/1
1 TABLET ORAL DAILY
Qty: 90 TABLET | Refills: 0 | Status: SHIPPED | OUTPATIENT
Start: 2018-12-14 | End: 2019-03-11 | Stop reason: SDUPTHER

## 2018-12-24 RX ORDER — HYDROCHLOROTHIAZIDE 25 MG/1
TABLET ORAL
Qty: 90 TABLET | Refills: 3 | Status: SHIPPED | OUTPATIENT
Start: 2018-12-24 | End: 2020-04-20 | Stop reason: SDUPTHER

## 2019-03-11 RX ORDER — TRIAMTERENE AND HYDROCHLOROTHIAZIDE 75; 50 MG/1; MG/1
1 TABLET ORAL DAILY
Qty: 90 TABLET | Refills: 3 | Status: SHIPPED | OUTPATIENT
Start: 2019-03-11 | End: 2019-12-03 | Stop reason: SDUPTHER

## 2019-06-04 NOTE — PROGRESS NOTES
Tyrell Guzmán  1960  [phone]  [phone]    08/30/2018    LAURO Avila MD    Chief Complaint   Patient presents with   • Hypertension       PROBLEM LIST:  1. Bilateral lower extremity edema.  2. Abnormal EKG revealing poor anterior R-wave progression, inferior lateral T-wave inversion:  a. Reportedly normal stress test, 2002 -- data deficit.  b. A 2D echocardiogram, 04/28/2008:   Mild LVF with ejection fraction greater than 55%, trace mitral regurgitation, trace tricuspid regurgitation with normal RVSP.  c. Cardiolite GXT, 04/30/2008:  Normal ejection fraction of 66% with no inducible ischemia.  d. Echocardiogram, 9/26/2014: Normal LV function and wall motion. Minimally reduced exercise tolerance.  e. Adult stress echo 11/27/2018: Left ventricular systolic function is normal. Estimated EF = 60%. Excellent exercise tolerance with than expected exercise duration of 9 minutes and 40 seconds and actual duration of 10 minutes and 16 seconds. Normal blood pressure and heart rate response to exercise Abnormal baseline EKG which became more abnormal with exercise as expected. No evidence of inducible ischemia by clinical or echocardiographic criteria.  3. Hypertension.  4. Obesity.  5. Colon cancer:  a. Colectomy, 08/12/2010, Dr. Davis.  b. Chemotherapy x6 months.  6. Surgical history:  a. Vasectomy.  b. Colectomy.    Allergies   Allergen Reactions   • Lisinopril Cough       Current Medications:      Current Outpatient Prescriptions:   •  B Complex Vitamins (VITAMIN B COMPLEX) capsule capsule, Take  by mouth Daily., Disp: , Rfl:   •  hydrochlorothiazide (HYDRODIURIL) 25 MG tablet, TAKE 1 TABLET DAILY, Disp: 90 tablet, Rfl: 3  •  losartan (COZAAR) 100 MG tablet, TAKE 1 TABLET DAILY, Disp: 90 tablet, Rfl: 0  •  Multiple Vitamin (MULTI VITAMIN PO), Take  by mouth Daily., Disp: , Rfl:   •  triamterene-hydrochlorothiazide (MAXZIDE) 75-50 MG per tablet, Take 1 tablet by mouth Daily. Lab work 2 weeks after  "starting increased dose, Disp: 90 tablet, Rfl: 0    HPI    Tyrell Guzmán presents today for 12 month follow up of abnormal EKG and hypertension. Since last visit, patient has been having increased blood pressure and lower extremity edema. He states he monitors his salt intake. He states his edema is worse when he stands for prolonged peroids of time, but wears support stockings to control his edema. Patient denies chest pain, palpitations, shortness of breath, PND, orthopnea, dizziness, and syncope. Remains busy and active with walking 2 miles a day with no limitations.    The following portions of the patient's history were reviewed and updated as appropriate: allergies, current medications and problem list.    Pertinent positives as listed in the HPI.  All other systems reviewed are negative.    Vitals:    08/30/18 1537   BP: 162/74   BP Location: Left arm   Patient Position: Sitting   Pulse: 68   Weight: (!) 149 kg (328 lb)   Height: 175.3 cm (69\")       Physical Exam:  General: Alert and oriented to person, place, and time.  Neck: Jugular venous pressure is within normal limits. Carotids have normal upstrokes without bruits.   Cardiovascular: Regular rate and rhythm without murmur gallop or rub.  Lungs: Clear without rales or wheezes. Equal expansion is noted.   Extremities: Show trace-1+ edema.  Skin: warm and dry.  Neurologic: nonfocal    Diagnostic Data:    Lab Results   Component Value Date    GLUCOSE 113 (H) 07/25/2018    BUN 14 07/25/2018    CREATININE 1.13 07/25/2018    EGFRIFAFRI 81 07/25/2018    BCR 12.4 07/25/2018    K 4.6 07/25/2018    CO2 33.0 (H) 07/25/2018    CALCIUM 9.5 07/25/2018    ALBUMIN 4.33 07/25/2018    AST 23 07/25/2018    ALT 31 07/25/2018     Lab Results   Component Value Date    WBC 7.80 07/25/2018    HGB 15.3 07/25/2018    HCT 48.3 07/25/2018    MCV 84.6 07/25/2018     07/25/2018 6/8/2018      HDL 38.1  .6  TSH 2.95  Sodium 139  Potassium " 4.10  Creatinine 1.07     Procedures    Assessment:      ICD-10-CM ICD-9-CM   1. Essential hypertension I10 401.9   2. Bilateral lower extremity edema. R60.0 782.3       Plan:    1. Increase Maxzide 37.5-25 mg daily to BID.  2. Schedule a BMP to monitor potassium/renal function.  3. Continue current medications.  4. F/up in 1 year or sooner if needed.    Scribed for Suzan Ellis MD by Johnnie Garduno. 8/30/2018  4:00 PM     I Suzan Ellis MD personally performed the services described in this documentation as scribed by the above individual in my presence, and it is both accurate and complete.    Suzan Ellis MD, FACC       97

## 2019-07-01 RX ORDER — LOSARTAN POTASSIUM 100 MG/1
TABLET ORAL
Qty: 90 TABLET | Refills: 0 | Status: SHIPPED | OUTPATIENT
Start: 2019-07-01 | End: 2019-09-29 | Stop reason: SDUPTHER

## 2019-09-12 ENCOUNTER — OFFICE VISIT (OUTPATIENT)
Dept: CARDIOLOGY | Facility: CLINIC | Age: 59
End: 2019-09-12

## 2019-09-12 VITALS
WEIGHT: 315 LBS | BODY MASS INDEX: 46.65 KG/M2 | HEART RATE: 92 BPM | HEIGHT: 69 IN | OXYGEN SATURATION: 94 % | SYSTOLIC BLOOD PRESSURE: 138 MMHG | DIASTOLIC BLOOD PRESSURE: 62 MMHG

## 2019-09-12 DIAGNOSIS — I10 ESSENTIAL HYPERTENSION: ICD-10-CM

## 2019-09-12 DIAGNOSIS — E66.01 MORBID OBESITY WITH BMI OF 45.0-49.9, ADULT (HCC): ICD-10-CM

## 2019-09-12 DIAGNOSIS — R60.0 LOWER EXTREMITY EDEMA: Primary | ICD-10-CM

## 2019-09-12 PROCEDURE — 99214 OFFICE O/P EST MOD 30 MIN: CPT | Performed by: INTERNAL MEDICINE

## 2019-09-12 NOTE — PROGRESS NOTES
[facility] Cardiology  Tyrell Guzmán  1960  59 y.o.  [phone]      Date: 09/12/2019    PCP: LAURO Avila MD    Chief Complaint   Patient presents with   • Edema       Problem List:  1. Bilateral lower extremity edema.  2. Abnormal EKG revealing poor anterior R-wave progression, inferior lateral T-wave inversion:  a. Reportedly normal stress test, 2002 -- data deficit.  b. Echocardiogram, 04/28/2008: EF > 55%, trace MR/TR with normal RVSP.  c. Cardiolite GXT, 04/30/2008: EF 66% with no inducible ischemia.  d. Echocardiogram, 09/26/2014: Normal EF. Minimally reduced exercise tolerance.  e. Stress echo, 11/27/2017: EF 60%. Excellent exercise tolerance, expected 09:40 and actual duration of 10:16. Normal BP and HR response to exercise. Abnormal baseline EKG which became more abnormal with exercise as expected. No evidence of inducible ischemia.  3. Hypertension.  4. Obesity.  5. Colon cancer:  a. Colectomy, 08/12/2010, Dr. Davis.  b. Chemotherapy x6 months.  6. Surgical history:  a. Vasectomy.  b. Colectomy.    Allergies   Allergen Reactions   • Lisinopril Cough       Current Medications:      Current Outpatient Medications:   •  B Complex Vitamins (VITAMIN B COMPLEX) capsule capsule, Take  by mouth Daily., Disp: , Rfl:   •  hydrochlorothiazide (HYDRODIURIL) 25 MG tablet, TAKE 1 TABLET DAILY, Disp: 90 tablet, Rfl: 3  •  losartan (COZAAR) 100 MG tablet, TAKE 1 TABLET DAILY, Disp: 90 tablet, Rfl: 0  •  Multiple Vitamin (MULTI VITAMIN PO), Take  by mouth Daily., Disp: , Rfl:   •  SITagliptin (JANUVIA) 100 MG tablet, 100 mg Daily., Disp: , Rfl:   •  triamterene-hydrochlorothiazide (MAXZIDE) 75-50 MG per tablet, TAKE 1 TABLET BY MOUTH DAILY, Disp: 90 tablet, Rfl: 3    HPI    Tyrell Guzmán is a 59 y.o. male who presents today for annual follow up of edema and hypertension. Since last visit, he has been exercising more and eating less, and has lost 14 lbs.  "He has been walking, lifting low weights, and doing some cardio. Pt reports his edema has slightly improved with his weight loss, exercise, and wearing support stockings. Patient denies chest pain, palpitations, shortness of breath, dizziness, and syncope.     The following portions of the patient's history were reviewed and updated as appropriate: allergies, current medications and problem list.    Pertinent positives as listed in the HPI.  All other systems reviewed are negative.    Vitals:    09/12/19 0932   BP: 138/62   BP Location: Right arm   Patient Position: Sitting   Pulse: 92   SpO2: 94%   Weight: (!) 143 kg (315 lb)   Height: 175.3 cm (69\")       Physical Exam:    General: Alert and oriented.  Neck: Jugular venous pressure is within normal limits. Carotids have normal upstrokes without bruits.   Cardiovascular: Heart has a nondisplaced focal PMI. Regular rate and rhythm without murmur, gallop or rub.  Lungs: Clear without rales or wheezes. Equal expansion is noted.   Extremities: Show trace edema.  Skin: Warm and dry.  Neurologic: Nonfocal.    Diagnostic Data:  Lab Results   Component Value Date    GLUCOSE 144 (H) 10/05/2018    BUN 19 10/05/2018    CREATININE 1.20 10/05/2018    EGFRIFAFRI 76 10/05/2018    BCR 15.8 10/05/2018     10/05/2018    K 3.8 10/05/2018     10/05/2018    CO2 29.0 10/05/2018    CALCIUM 9.1 10/05/2018    ALBUMIN 4.33 07/25/2018    AST 23 07/25/2018    ALT 31 07/25/2018       Procedures    Assessment:      ICD-10-CM ICD-9-CM   1. Bilateral lower extremity edema. R60.0 782.3   2. Essential hypertension I10 401.9   3. Morbid obesity with BMI of 45.0-49.9, adult (CMS/[facility]) E66.01 278.01    Z68.42 V85.42     Lab results found above were reviewed with the patient.   Patient was congratulated on his weight loss.    Plan:    1. Continue routine diet and exercise. Pt was encouraged to continue losing weight.  2. Continue HCT and Maxzide for fluid retention.  3. Continue losartan for " hypertension.  4. Continue all other current medications.  5. F/up in 12 months or sooner if needed.      Scribed for Suzan Ellis MD by Carolyne Mcallister. 9/12/2019  9:42 AM     I Suzan Ellis MD personally performed the services described in this documentation as scribed by the above individual in my presence, and it is both accurate and complete.    Suzan Ellis MD, FACC

## 2019-09-30 RX ORDER — LOSARTAN POTASSIUM 100 MG/1
TABLET ORAL
Qty: 90 TABLET | Refills: 3 | Status: SHIPPED | OUTPATIENT
Start: 2019-09-30

## 2019-10-15 ENCOUNTER — OFFICE VISIT (OUTPATIENT)
Dept: NEUROLOGY | Facility: CLINIC | Age: 59
End: 2019-10-15

## 2019-10-15 VITALS — OXYGEN SATURATION: 98 % | HEIGHT: 69 IN | BODY MASS INDEX: 46.65 KG/M2 | WEIGHT: 315 LBS | HEART RATE: 58 BPM

## 2019-10-15 DIAGNOSIS — G47.33 OBSTRUCTIVE APNEA: Primary | ICD-10-CM

## 2019-10-15 PROCEDURE — 99212 OFFICE O/P EST SF 10 MIN: CPT | Performed by: PSYCHIATRY & NEUROLOGY

## 2019-10-15 RX ORDER — OMEPRAZOLE 20 MG/1
CAPSULE, DELAYED RELEASE ORAL
COMMUNITY
End: 2020-01-03

## 2019-10-15 RX ORDER — LEVOFLOXACIN 500 MG/1
TABLET, FILM COATED ORAL
COMMUNITY
End: 2020-01-03

## 2019-10-15 RX ORDER — LANCETS 30 GAUGE
EACH MISCELLANEOUS
COMMUNITY
End: 2020-01-03

## 2019-10-15 RX ORDER — BLOOD-GLUCOSE METER
EACH MISCELLANEOUS
COMMUNITY
End: 2020-01-03

## 2019-10-15 RX ORDER — FLASH GLUCOSE SENSOR
KIT MISCELLANEOUS
COMMUNITY
Start: 2019-08-31 | End: 2020-01-03

## 2019-10-15 NOTE — PROGRESS NOTES
"Subjective:   Chief Complaint   Patient presents with   • Sleep Apnea       Patient ID: Tyrell Guzmán is a 59 y.o. male.    History of Present Illness     59 y.o. male with DIPIKA returns in follow up.  Last visit on 11/12/18 renewed mask and supplies.       No new or worsening sx.      Compliant with CPAP and receiving benefit.      Averaging 7 to 8 hours a night using CPAP 12 cm H20.  Restorative sleep with CPAP.     DME:  [employer]     The following portions of the patient's history were reviewed and updated as appropriate: allergies, current medications, past medical history, past surgical history and problem list.    Review of Systems   Constitutional: Negative for activity change and unexpected weight change.   HENT: Negative for facial swelling, hearing loss, tinnitus, trouble swallowing and voice change.    Eyes: Negative for photophobia, pain and visual disturbance.   Respiratory: Negative for apnea and choking.    Gastrointestinal: Negative for constipation.   Endocrine: Negative for cold intolerance.   Genitourinary: Negative for difficulty urinating, frequency and urgency.   Musculoskeletal: Negative for back pain, gait problem and neck stiffness.   Allergic/Immunologic: Negative for immunocompromised state.   Neurological: Negative for dizziness, tremors, seizures, syncope, facial asymmetry, speech difficulty and light-headedness.   Hematological: Negative for adenopathy.   Psychiatric/Behavioral: Positive for sleep disturbance. Negative for confusion, decreased concentration and hallucinations. The patient is not nervous/anxious.         Objective:  Vitals:    10/15/19 0809   Pulse: 58   SpO2: 98%   Weight: (!) 143 kg (315 lb)   Height: 175.3 cm (69\")       Neurologic Exam     Mental Status   Oriented to person, place, and time.   Speech: speech is normal   Level of consciousness: alert  Knowledge: good and consistent with education.   Normal comprehension.     Cranial Nerves   Cranial " nerves II through XII intact.     CN II   Visual fields full to confrontation.   Visual acuity: normal  Right visual field deficit: none  Left visual field deficit: none     CN III, IV, VI   Pupils are equal, round, and reactive to light.  Extraocular motions are normal.   Nystagmus: none   Diplopia: none  Ophthalmoparesis: none  Upgaze: normal  Downgaze: normal  Conjugate gaze: present    CN V   Facial sensation intact.   Right corneal reflex: normal  Left corneal reflex: normal    CN VII   Right facial weakness: none  Left facial weakness: none    CN VIII   Hearing: intact    CN IX, X   Palate: symmetric  Right gag reflex: normal  Left gag reflex: normal    CN XI   Right sternocleidomastoid strength: normal  Left sternocleidomastoid strength: normal    CN XII   Tongue: not atrophic  Fasciculations: absent  Tongue deviation: none    Motor Exam   Muscle bulk: normal  Overall muscle tone: normal    Strength   Strength 5/5 throughout.     Sensory Exam   Light touch normal.     Gait, Coordination, and Reflexes     Gait  Gait: normal    Tremor   Resting tremor: absent  Intention tremor: absent  Action tremor: absent    Reflexes   Reflexes 2+ except as noted.       Physical Exam   Constitutional: He is oriented to person, place, and time.   Eyes: EOM are normal. Pupils are equal, round, and reactive to light.   Neurological: He is oriented to person, place, and time. He has normal strength. Gait normal.   Psychiatric: His speech is normal.       Assessment/Plan:       Problems Addressed this Visit        Respiratory    Obstructive apnea - Primary     Continue CPAP 12 cm H20

## 2019-12-03 RX ORDER — TRIAMTERENE AND HYDROCHLOROTHIAZIDE 75; 50 MG/1; MG/1
1 TABLET ORAL DAILY
Qty: 90 TABLET | Refills: 0 | Status: SHIPPED | OUTPATIENT
Start: 2019-12-03 | End: 2020-02-13

## 2020-01-03 ENCOUNTER — HOSPITAL ENCOUNTER (OUTPATIENT)
Dept: GENERAL RADIOLOGY | Facility: HOSPITAL | Age: 60
Discharge: HOME | End: 2020-01-03
Admitting: UROLOGY

## 2020-01-03 ENCOUNTER — APPOINTMENT (OUTPATIENT)
Dept: PREADMISSION TESTING | Facility: HOSPITAL | Age: 60
End: 2020-01-03

## 2020-01-03 VITALS — HEIGHT: 69 IN | WEIGHT: 315 LBS | BODY MASS INDEX: 46.65 KG/M2

## 2020-01-03 LAB
DEPRECATED RDW RBC AUTO: 39.6 FL (ref 37–54)
ERYTHROCYTE [DISTWIDTH] IN BLOOD BY AUTOMATED COUNT: 13 % (ref 12.3–15.4)
HBA1C MFR BLD: 7.3 % (ref 4.8–5.6)
HCT VFR BLD AUTO: 45.8 % (ref 37.5–51)
HGB BLD-MCNC: 15.1 G/DL (ref 13–17.7)
MCH RBC QN AUTO: 27.6 PG (ref 26.6–33)
MCHC RBC AUTO-ENTMCNC: 33 G/DL (ref 31.5–35.7)
MCV RBC AUTO: 83.6 FL (ref 79–97)
PLATELET # BLD AUTO: 244 10*3/MM3 (ref 140–450)
PMV BLD AUTO: 9.9 FL (ref 6–12)
RBC # BLD AUTO: 5.48 10*6/MM3 (ref 4.14–5.8)
WBC NRBC COR # BLD: 7.44 10*3/MM3 (ref 3.4–10.8)

## 2020-01-03 PROCEDURE — 36415 COLL VENOUS BLD VENIPUNCTURE: CPT

## 2020-01-03 PROCEDURE — 83036 HEMOGLOBIN GLYCOSYLATED A1C: CPT | Performed by: UROLOGY

## 2020-01-03 PROCEDURE — 85027 COMPLETE CBC AUTOMATED: CPT | Performed by: UROLOGY

## 2020-01-03 PROCEDURE — 71046 X-RAY EXAM CHEST 2 VIEWS: CPT

## 2020-01-03 NOTE — PAT
Patient instructed to drink 20 ounces (or until full) of Gatorade and it needs to be completed 1 hour before given arrival time for procedure (NO RED Gatorade)    Patient verbalized understanding.  Per Anesthesia Request, patient instructed not to take their ACE/ARB medications on the AM of surgery.  Patient to apply Chlorhexadine wipes  to surgical area (as instructed) the night before procedure and the AM of procedure. Wipes provided.  Abnormal EKG from July 2019 and comparison EKG from May 2016 faxed to Dr. Ayon with last cardiology notes from Sept 2019 from Dr. Ellis. Fax cover sheet with patient's history faxed as well and verbally discussed over the phone with Dr. Ayon-patient is cleared for surgery from his standpoint.

## 2020-01-03 NOTE — DISCHARGE INSTRUCTIONS
The following information and instructions were given:    Do not eat, drink, smoke or chew gum after midnight the night before surgery. This also includes no mints.  Take all routine, prescribed medications including heart and blood pressure medicines with a sip of water unless otherwise instructed by your physician.   Do NOT take diabetic medication unless instructed by your physician.    DO NOT shave for two days before your procedure.  Do not wear makeup.      DO NOT wear fingernail polish (gel/regular) and/or acrylic/artificial nails on the day of surgery.   If you have had a recent manicure and would rather not remove polish or artificial nails, then the minimum requirement is that the polish/artificial nails must be removed from the middle finger on each hand.      If you are having surgery/procedure on an upper extremity, then fingernail polish/artificial fingernails must be removed for surgery.  NO EXCEPTIONS.      If you are having surgery/procedure on a lower extremity, then toenail polish on both extremities must be removed for surgery.  NO EXCEPTIONS.    Remove all jewelry (advise to go to jeweler if unable to remove).  Jewelry, especially rings, can no longer be taped for surgery.    Leave anything you consider valuable at home.    Leave your suitcase in the car until after your surgery.    Bring the following with you the day of your procedure (when applicable)       -picture ID and insurance cards       -Co-pay/deductible required by insurance       -Medications in the original bottles (not a list) including all over-the-counter medications if not brought to PAT       -Copy of advance directive, living will or power of  documents if not brought to PAT       -CPAP or BIPAP mask and tubing (do not bring machine)       -Skin prep instruction(s) sheet       -PAT Pass    Education booklet, brochure, handout or binder related to procedure given to patient.  Education booklet also includes general  information related to their recovery that mentions signs and symptoms of infection and when to call the doctor.    When applicable, an ERAS booklet was given to patient.    Pain Control After Surgery handout given to patient.    Respirex use (handout given to patient) and pneumonia prevention education provided.    Signs and Symptoms of infection were discussed with patient in Pre Admission testing.  Patient instructed to call their doctor if any of the following symptoms are noted during recovery:  fever of 100.4 F or higher, incision that is warm or has increasing bleeding, redness, or drainage.    DVT Prevention instructions given verbally during Pre Admission Testing appointment that stressed the importance of ambulation to improve blood circulation.  Also encouraged patient to perform foot exercises when in bed and that the application of a sequential device might be applied to lower extremities to improve circulation.      Please apply Chlorhexadine wipes to surgical area (if instructed) the night before procedure and the AM of procedure and document date/time of applications on skin prep instruction sheet.

## 2020-01-10 ENCOUNTER — ANESTHESIA (OUTPATIENT)
Dept: PERIOP | Facility: HOSPITAL | Age: 60
End: 2020-01-10

## 2020-01-10 ENCOUNTER — ANESTHESIA EVENT (OUTPATIENT)
Dept: PERIOP | Facility: HOSPITAL | Age: 60
End: 2020-01-10

## 2020-01-10 ENCOUNTER — HOSPITAL ENCOUNTER (OUTPATIENT)
Facility: HOSPITAL | Age: 60
LOS: 1 days | Discharge: HOME | End: 2020-01-11
Attending: UROLOGY | Admitting: UROLOGY

## 2020-01-10 DIAGNOSIS — C61 PROSTATE CANCER (HCC): Primary | ICD-10-CM

## 2020-01-10 PROBLEM — E11.9 DIABETES (HCC): Status: ACTIVE | Noted: 2020-01-10

## 2020-01-10 LAB
GLUCOSE BLDC GLUCOMTR-MCNC: 112 MG/DL (ref 70–130)
GLUCOSE BLDC GLUCOMTR-MCNC: 181 MG/DL (ref 70–130)
GLUCOSE BLDC GLUCOMTR-MCNC: 285 MG/DL (ref 70–130)
POTASSIUM BLD-SCNC: 3.6 MMOL/L (ref 3.5–5.2)

## 2020-01-10 PROCEDURE — 25010000002 BUPRENORPHINE PER 0.1 MG: Performed by: ANESTHESIOLOGY

## 2020-01-10 PROCEDURE — 25010000002 FENTANYL CITRATE (PF) 100 MCG/2ML SOLUTION: Performed by: NURSE ANESTHETIST, CERTIFIED REGISTERED

## 2020-01-10 PROCEDURE — 25010000002 NEOSTIGMINE 10 MG/10ML SOLUTION: Performed by: NURSE ANESTHETIST, CERTIFIED REGISTERED

## 2020-01-10 PROCEDURE — 25010000002 CEFTRIAXONE: Performed by: UROLOGY

## 2020-01-10 PROCEDURE — 84132 ASSAY OF SERUM POTASSIUM: CPT | Performed by: UROLOGY

## 2020-01-10 PROCEDURE — 25010000002 PROPOFOL 10 MG/ML EMULSION: Performed by: NURSE ANESTHETIST, CERTIFIED REGISTERED

## 2020-01-10 PROCEDURE — 25010000002 HYDROMORPHONE PER 4 MG: Performed by: UROLOGY

## 2020-01-10 PROCEDURE — 25010000002 DEXAMETHASONE SODIUM PHOSPHATE 10 MG/ML SOLUTION: Performed by: ANESTHESIOLOGY

## 2020-01-10 PROCEDURE — 63710000001 INSULIN LISPRO (HUMAN) PER 5 UNITS: Performed by: NURSE PRACTITIONER

## 2020-01-10 PROCEDURE — 82962 GLUCOSE BLOOD TEST: CPT

## 2020-01-10 PROCEDURE — 25010000002 CEFOXITIN PER 1 G: Performed by: UROLOGY

## 2020-01-10 PROCEDURE — 25010000002 ONDANSETRON PER 1 MG: Performed by: NURSE ANESTHETIST, CERTIFIED REGISTERED

## 2020-01-10 PROCEDURE — 25810000003 SODIUM CHLORIDE 0.9 % WITH KCL 20 MEQ 20-0.9 MEQ/L-% SOLUTION: Performed by: UROLOGY

## 2020-01-10 RX ORDER — NICOTINE POLACRILEX 4 MG
15 LOZENGE BUCCAL
Status: DISCONTINUED | OUTPATIENT
Start: 2020-01-10 | End: 2020-01-11 | Stop reason: HOSPADM

## 2020-01-10 RX ORDER — PROMETHAZINE HYDROCHLORIDE 25 MG/1
25 SUPPOSITORY RECTAL ONCE AS NEEDED
Status: DISCONTINUED | OUTPATIENT
Start: 2020-01-10 | End: 2020-01-10 | Stop reason: HOSPADM

## 2020-01-10 RX ORDER — FAMOTIDINE 10 MG/ML
20 INJECTION, SOLUTION INTRAVENOUS
Status: COMPLETED | OUTPATIENT
Start: 2020-01-10 | End: 2020-01-10

## 2020-01-10 RX ORDER — FAMOTIDINE 10 MG/ML
20 INJECTION, SOLUTION INTRAVENOUS
Status: DISCONTINUED | OUTPATIENT
Start: 2020-01-10 | End: 2020-01-10 | Stop reason: HOSPADM

## 2020-01-10 RX ORDER — CEFTRIAXONE SODIUM 2 G/50ML
2 INJECTION, SOLUTION INTRAVENOUS ONCE
Status: DISCONTINUED | OUTPATIENT
Start: 2020-01-10 | End: 2020-01-10 | Stop reason: HOSPADM

## 2020-01-10 RX ORDER — ACETAMINOPHEN 160 MG/5ML
650 SOLUTION ORAL EVERY 6 HOURS
Status: DISCONTINUED | OUTPATIENT
Start: 2020-01-10 | End: 2020-01-11 | Stop reason: HOSPADM

## 2020-01-10 RX ORDER — ONDANSETRON 2 MG/ML
4 INJECTION INTRAMUSCULAR; INTRAVENOUS ONCE AS NEEDED
Status: DISCONTINUED | OUTPATIENT
Start: 2020-01-10 | End: 2020-01-10 | Stop reason: HOSPADM

## 2020-01-10 RX ORDER — FENTANYL CITRATE 50 UG/ML
25 INJECTION, SOLUTION INTRAMUSCULAR; INTRAVENOUS
Status: DISCONTINUED | OUTPATIENT
Start: 2020-01-10 | End: 2020-01-10 | Stop reason: HOSPADM

## 2020-01-10 RX ORDER — ROCURONIUM BROMIDE 10 MG/ML
INJECTION, SOLUTION INTRAVENOUS AS NEEDED
Status: DISCONTINUED | OUTPATIENT
Start: 2020-01-10 | End: 2020-01-10 | Stop reason: SURG

## 2020-01-10 RX ORDER — BUPRENORPHINE HYDROCHLORIDE 0.32 MG/ML
INJECTION INTRAMUSCULAR; INTRAVENOUS
Status: COMPLETED | OUTPATIENT
Start: 2020-01-10 | End: 2020-01-10

## 2020-01-10 RX ORDER — PROMETHAZINE HYDROCHLORIDE 25 MG/ML
6.25 INJECTION, SOLUTION INTRAMUSCULAR; INTRAVENOUS ONCE AS NEEDED
Status: DISCONTINUED | OUTPATIENT
Start: 2020-01-10 | End: 2020-01-10 | Stop reason: HOSPADM

## 2020-01-10 RX ORDER — LIDOCAINE HYDROCHLORIDE 10 MG/ML
0.5 INJECTION, SOLUTION EPIDURAL; INFILTRATION; INTRACAUDAL; PERINEURAL ONCE AS NEEDED
Status: COMPLETED | OUTPATIENT
Start: 2020-01-10 | End: 2020-01-10

## 2020-01-10 RX ORDER — SODIUM CHLORIDE 0.9 % (FLUSH) 0.9 %
10 SYRINGE (ML) INJECTION AS NEEDED
Status: DISCONTINUED | OUTPATIENT
Start: 2020-01-10 | End: 2020-01-10 | Stop reason: HOSPADM

## 2020-01-10 RX ORDER — BUPIVACAINE HYDROCHLORIDE 2.5 MG/ML
INJECTION, SOLUTION EPIDURAL; INFILTRATION; INTRACAUDAL
Status: COMPLETED | OUTPATIENT
Start: 2020-01-10 | End: 2020-01-10

## 2020-01-10 RX ORDER — FAMOTIDINE 20 MG/1
20 TABLET, FILM COATED ORAL
Status: DISCONTINUED | OUTPATIENT
Start: 2020-01-10 | End: 2020-01-10 | Stop reason: HOSPADM

## 2020-01-10 RX ORDER — LIDOCAINE HYDROCHLORIDE 10 MG/ML
INJECTION, SOLUTION EPIDURAL; INFILTRATION; INTRACAUDAL; PERINEURAL AS NEEDED
Status: DISCONTINUED | OUTPATIENT
Start: 2020-01-10 | End: 2020-01-10 | Stop reason: SURG

## 2020-01-10 RX ORDER — OXYCODONE HYDROCHLORIDE 5 MG/1
5 TABLET ORAL EVERY 4 HOURS PRN
Status: DISCONTINUED | OUTPATIENT
Start: 2020-01-10 | End: 2020-01-11 | Stop reason: HOSPADM

## 2020-01-10 RX ORDER — HYDRALAZINE HYDROCHLORIDE 20 MG/ML
5 INJECTION INTRAMUSCULAR; INTRAVENOUS
Status: DISCONTINUED | OUTPATIENT
Start: 2020-01-10 | End: 2020-01-10 | Stop reason: HOSPADM

## 2020-01-10 RX ORDER — NALOXONE HCL 0.4 MG/ML
0.1 VIAL (ML) INJECTION
Status: DISCONTINUED | OUTPATIENT
Start: 2020-01-10 | End: 2020-01-11 | Stop reason: HOSPADM

## 2020-01-10 RX ORDER — ESMOLOL HYDROCHLORIDE 10 MG/ML
INJECTION INTRAVENOUS AS NEEDED
Status: DISCONTINUED | OUTPATIENT
Start: 2020-01-10 | End: 2020-01-10 | Stop reason: SURG

## 2020-01-10 RX ORDER — PROMETHAZINE HYDROCHLORIDE 25 MG/1
25 TABLET ORAL ONCE AS NEEDED
Status: DISCONTINUED | OUTPATIENT
Start: 2020-01-10 | End: 2020-01-10 | Stop reason: HOSPADM

## 2020-01-10 RX ORDER — ONDANSETRON 2 MG/ML
4 INJECTION INTRAMUSCULAR; INTRAVENOUS EVERY 6 HOURS PRN
Status: DISCONTINUED | OUTPATIENT
Start: 2020-01-10 | End: 2020-01-11 | Stop reason: HOSPADM

## 2020-01-10 RX ORDER — SODIUM CHLORIDE 9 MG/ML
INJECTION, SOLUTION INTRAVENOUS AS NEEDED
Status: DISCONTINUED | OUTPATIENT
Start: 2020-01-10 | End: 2020-01-10 | Stop reason: HOSPADM

## 2020-01-10 RX ORDER — LABETALOL HYDROCHLORIDE 5 MG/ML
10 INJECTION, SOLUTION INTRAVENOUS EVERY 4 HOURS PRN
Status: DISCONTINUED | OUTPATIENT
Start: 2020-01-10 | End: 2020-01-11 | Stop reason: HOSPADM

## 2020-01-10 RX ORDER — IPRATROPIUM BROMIDE AND ALBUTEROL SULFATE 2.5; .5 MG/3ML; MG/3ML
3 SOLUTION RESPIRATORY (INHALATION) ONCE AS NEEDED
Status: DISCONTINUED | OUTPATIENT
Start: 2020-01-10 | End: 2020-01-10 | Stop reason: HOSPADM

## 2020-01-10 RX ORDER — HYDROMORPHONE HYDROCHLORIDE 1 MG/ML
0.5 INJECTION, SOLUTION INTRAMUSCULAR; INTRAVENOUS; SUBCUTANEOUS
Status: DISCONTINUED | OUTPATIENT
Start: 2020-01-10 | End: 2020-01-11 | Stop reason: HOSPADM

## 2020-01-10 RX ORDER — GLYCOPYRROLATE 0.2 MG/ML
INJECTION INTRAMUSCULAR; INTRAVENOUS AS NEEDED
Status: DISCONTINUED | OUTPATIENT
Start: 2020-01-10 | End: 2020-01-10 | Stop reason: SURG

## 2020-01-10 RX ORDER — MEPERIDINE HYDROCHLORIDE 25 MG/ML
12.5 INJECTION INTRAMUSCULAR; INTRAVENOUS; SUBCUTANEOUS
Status: DISCONTINUED | OUTPATIENT
Start: 2020-01-10 | End: 2020-01-10 | Stop reason: HOSPADM

## 2020-01-10 RX ORDER — LIDOCAINE HYDROCHLORIDE 10 MG/ML
0.5 INJECTION, SOLUTION EPIDURAL; INFILTRATION; INTRACAUDAL; PERINEURAL ONCE AS NEEDED
Status: DISCONTINUED | OUTPATIENT
Start: 2020-01-10 | End: 2020-01-10 | Stop reason: HOSPADM

## 2020-01-10 RX ORDER — ONDANSETRON 2 MG/ML
INJECTION INTRAMUSCULAR; INTRAVENOUS AS NEEDED
Status: DISCONTINUED | OUTPATIENT
Start: 2020-01-10 | End: 2020-01-10 | Stop reason: SURG

## 2020-01-10 RX ORDER — PROPOFOL 10 MG/ML
VIAL (ML) INTRAVENOUS AS NEEDED
Status: DISCONTINUED | OUTPATIENT
Start: 2020-01-10 | End: 2020-01-10 | Stop reason: SURG

## 2020-01-10 RX ORDER — DEXAMETHASONE SODIUM PHOSPHATE 10 MG/ML
INJECTION, SOLUTION INTRAMUSCULAR; INTRAVENOUS
Status: COMPLETED | OUTPATIENT
Start: 2020-01-10 | End: 2020-01-10

## 2020-01-10 RX ORDER — FAMOTIDINE 20 MG/1
20 TABLET, FILM COATED ORAL
Status: COMPLETED | OUTPATIENT
Start: 2020-01-10 | End: 2020-01-10

## 2020-01-10 RX ORDER — SODIUM CHLORIDE 0.9 % (FLUSH) 0.9 %
10 SYRINGE (ML) INJECTION EVERY 12 HOURS SCHEDULED
Status: DISCONTINUED | OUTPATIENT
Start: 2020-01-10 | End: 2020-01-10 | Stop reason: HOSPADM

## 2020-01-10 RX ORDER — SODIUM CHLORIDE, SODIUM LACTATE, POTASSIUM CHLORIDE, CALCIUM CHLORIDE 600; 310; 30; 20 MG/100ML; MG/100ML; MG/100ML; MG/100ML
9 INJECTION, SOLUTION INTRAVENOUS CONTINUOUS PRN
Status: DISCONTINUED | OUTPATIENT
Start: 2020-01-10 | End: 2020-01-10 | Stop reason: HOSPADM

## 2020-01-10 RX ORDER — DEXTROSE MONOHYDRATE 25 G/50ML
25 INJECTION, SOLUTION INTRAVENOUS
Status: DISCONTINUED | OUTPATIENT
Start: 2020-01-10 | End: 2020-01-11 | Stop reason: HOSPADM

## 2020-01-10 RX ORDER — SODIUM CHLORIDE 0.9 % (FLUSH) 0.9 %
3 SYRINGE (ML) INJECTION EVERY 12 HOURS SCHEDULED
Status: DISCONTINUED | OUTPATIENT
Start: 2020-01-10 | End: 2020-01-10 | Stop reason: HOSPADM

## 2020-01-10 RX ORDER — NALOXONE HCL 0.4 MG/ML
0.4 VIAL (ML) INJECTION AS NEEDED
Status: DISCONTINUED | OUTPATIENT
Start: 2020-01-10 | End: 2020-01-10 | Stop reason: HOSPADM

## 2020-01-10 RX ORDER — SODIUM CHLORIDE AND POTASSIUM CHLORIDE 150; 900 MG/100ML; MG/100ML
100 INJECTION, SOLUTION INTRAVENOUS CONTINUOUS
Status: DISCONTINUED | OUTPATIENT
Start: 2020-01-10 | End: 2020-01-11 | Stop reason: HOSPADM

## 2020-01-10 RX ORDER — ATRACURIUM BESYLATE 10 MG/ML
INJECTION, SOLUTION INTRAVENOUS AS NEEDED
Status: DISCONTINUED | OUTPATIENT
Start: 2020-01-10 | End: 2020-01-10 | Stop reason: SURG

## 2020-01-10 RX ORDER — DOCUSATE SODIUM 100 MG/1
100 CAPSULE, LIQUID FILLED ORAL 2 TIMES DAILY PRN
Status: DISCONTINUED | OUTPATIENT
Start: 2020-01-10 | End: 2020-01-11 | Stop reason: HOSPADM

## 2020-01-10 RX ORDER — DEXAMETHASONE SODIUM PHOSPHATE 10 MG/ML
INJECTION, SOLUTION INTRAMUSCULAR; INTRAVENOUS AS NEEDED
Status: DISCONTINUED | OUTPATIENT
Start: 2020-01-10 | End: 2020-01-10

## 2020-01-10 RX ORDER — MAGNESIUM HYDROXIDE 1200 MG/15ML
LIQUID ORAL AS NEEDED
Status: DISCONTINUED | OUTPATIENT
Start: 2020-01-10 | End: 2020-01-10 | Stop reason: HOSPADM

## 2020-01-10 RX ORDER — GABAPENTIN 100 MG/1
100 CAPSULE ORAL 3 TIMES DAILY
Status: DISCONTINUED | OUTPATIENT
Start: 2020-01-10 | End: 2020-01-11 | Stop reason: HOSPADM

## 2020-01-10 RX ORDER — NEOSTIGMINE METHYLSULFATE 1 MG/ML
INJECTION, SOLUTION INTRAVENOUS AS NEEDED
Status: DISCONTINUED | OUTPATIENT
Start: 2020-01-10 | End: 2020-01-10 | Stop reason: SURG

## 2020-01-10 RX ORDER — LOSARTAN POTASSIUM 50 MG/1
100 TABLET ORAL DAILY
Status: DISCONTINUED | OUTPATIENT
Start: 2020-01-11 | End: 2020-01-11 | Stop reason: HOSPADM

## 2020-01-10 RX ORDER — LOSARTAN POTASSIUM 50 MG/1
100 TABLET ORAL DAILY
Status: DISCONTINUED | OUTPATIENT
Start: 2020-01-10 | End: 2020-01-10

## 2020-01-10 RX ORDER — ACETAMINOPHEN 650 MG/1
650 SUPPOSITORY RECTAL EVERY 6 HOURS
Status: DISCONTINUED | OUTPATIENT
Start: 2020-01-10 | End: 2020-01-11 | Stop reason: HOSPADM

## 2020-01-10 RX ORDER — FENTANYL CITRATE 50 UG/ML
INJECTION, SOLUTION INTRAMUSCULAR; INTRAVENOUS AS NEEDED
Status: DISCONTINUED | OUTPATIENT
Start: 2020-01-10 | End: 2020-01-10 | Stop reason: SURG

## 2020-01-10 RX ORDER — ACETAMINOPHEN 325 MG/1
650 TABLET ORAL EVERY 6 HOURS
Status: DISCONTINUED | OUTPATIENT
Start: 2020-01-10 | End: 2020-01-11 | Stop reason: HOSPADM

## 2020-01-10 RX ORDER — LABETALOL HYDROCHLORIDE 5 MG/ML
5 INJECTION, SOLUTION INTRAVENOUS
Status: DISCONTINUED | OUTPATIENT
Start: 2020-01-10 | End: 2020-01-10 | Stop reason: HOSPADM

## 2020-01-10 RX ORDER — SODIUM CHLORIDE 0.9 % (FLUSH) 0.9 %
3-10 SYRINGE (ML) INJECTION AS NEEDED
Status: DISCONTINUED | OUTPATIENT
Start: 2020-01-10 | End: 2020-01-10 | Stop reason: HOSPADM

## 2020-01-10 RX ADMIN — HYDROMORPHONE HYDROCHLORIDE 0.5 MG: 1 INJECTION, SOLUTION INTRAMUSCULAR; INTRAVENOUS; SUBCUTANEOUS at 19:24

## 2020-01-10 RX ADMIN — SODIUM CHLORIDE, POTASSIUM CHLORIDE, SODIUM LACTATE AND CALCIUM CHLORIDE: 600; 310; 30; 20 INJECTION, SOLUTION INTRAVENOUS at 17:04

## 2020-01-10 RX ADMIN — BUPIVACAINE HYDROCHLORIDE 60 ML: 2.5 INJECTION, SOLUTION EPIDURAL; INFILTRATION; INTRACAUDAL; PERINEURAL at 13:27

## 2020-01-10 RX ADMIN — POTASSIUM CHLORIDE AND SODIUM CHLORIDE 100 ML/HR: 900; 150 INJECTION, SOLUTION INTRAVENOUS at 19:24

## 2020-01-10 RX ADMIN — NEOSTIGMINE METHYLSULFATE 3 MG: 1 INJECTION, SOLUTION INTRAVENOUS at 17:06

## 2020-01-10 RX ADMIN — CEFOXITIN 2 G: 2 INJECTION, POWDER, FOR SOLUTION INTRAVENOUS at 22:39

## 2020-01-10 RX ADMIN — LIDOCAINE HYDROCHLORIDE 50 MG: 10 INJECTION, SOLUTION EPIDURAL; INFILTRATION; INTRACAUDAL; PERINEURAL at 13:23

## 2020-01-10 RX ADMIN — ONDANSETRON 4 MG: 2 INJECTION INTRAMUSCULAR; INTRAVENOUS at 16:19

## 2020-01-10 RX ADMIN — ROCURONIUM BROMIDE 50 MG: 10 INJECTION INTRAVENOUS at 13:23

## 2020-01-10 RX ADMIN — ACETAMINOPHEN 650 MG: 325 TABLET ORAL at 21:29

## 2020-01-10 RX ADMIN — GABAPENTIN 100 MG: 100 CAPSULE ORAL at 21:29

## 2020-01-10 RX ADMIN — GLYCOPYRROLATE 0.4 MG: 0.2 INJECTION, SOLUTION INTRAMUSCULAR; INTRAVENOUS at 17:06

## 2020-01-10 RX ADMIN — LIDOCAINE HYDROCHLORIDE 0.4 ML: 10 INJECTION, SOLUTION EPIDURAL; INFILTRATION; INTRACAUDAL; PERINEURAL at 11:36

## 2020-01-10 RX ADMIN — FAMOTIDINE 20 MG: 20 TABLET ORAL at 11:36

## 2020-01-10 RX ADMIN — ONDANSETRON 4 MG: 2 INJECTION INTRAMUSCULAR; INTRAVENOUS at 15:24

## 2020-01-10 RX ADMIN — SODIUM CHLORIDE, POTASSIUM CHLORIDE, SODIUM LACTATE AND CALCIUM CHLORIDE: 600; 310; 30; 20 INJECTION, SOLUTION INTRAVENOUS at 14:58

## 2020-01-10 RX ADMIN — FENTANYL CITRATE 50 MCG: 0.05 INJECTION, SOLUTION INTRAMUSCULAR; INTRAVENOUS at 17:53

## 2020-01-10 RX ADMIN — ATRACURIUM BESYLATE 20 MG: 10 INJECTION, SOLUTION INTRAVENOUS at 16:07

## 2020-01-10 RX ADMIN — BUPRENORPHINE HYDROCHLORIDE 0.3 MG: 0.32 INJECTION INTRAMUSCULAR; INTRAVENOUS at 13:27

## 2020-01-10 RX ADMIN — PROPOFOL 250 MG: 10 INJECTION, EMULSION INTRAVENOUS at 13:23

## 2020-01-10 RX ADMIN — FENTANYL CITRATE 50 MCG: 50 INJECTION, SOLUTION INTRAMUSCULAR; INTRAVENOUS at 16:06

## 2020-01-10 RX ADMIN — ESMOLOL HYDROCHLORIDE 10 MG: 10 INJECTION INTRAVENOUS at 13:21

## 2020-01-10 RX ADMIN — DEXAMETHASONE SODIUM PHOSPHATE 4 MG: 10 INJECTION INTRAMUSCULAR; INTRAVENOUS at 13:27

## 2020-01-10 RX ADMIN — FENTANYL CITRATE 50 MCG: 50 INJECTION, SOLUTION INTRAMUSCULAR; INTRAVENOUS at 13:23

## 2020-01-10 RX ADMIN — SODIUM CHLORIDE, POTASSIUM CHLORIDE, SODIUM LACTATE AND CALCIUM CHLORIDE 9 ML/HR: 600; 310; 30; 20 INJECTION, SOLUTION INTRAVENOUS at 11:35

## 2020-01-10 RX ADMIN — CEFTRIAXONE 2 G: 2 INJECTION, POWDER, FOR SOLUTION INTRAMUSCULAR; INTRAVENOUS at 13:17

## 2020-01-10 NOTE — OP NOTE
PROSTATECTOMY LAPAROSCOPIC WITH DAVINCI ROBOT  Procedure Report    Patient Name:  Tyrell Guzmán  YOB: 1960    Date of Surgery:  1/10/2020     Indications: Prostate cancer    Pre-op Diagnosis:   Adenocarcinoma of the prostate       Post-Op Diagnosis Codes: Same    Procedure/CPT® Codes:      Procedure(s):  PROSTATECTOMY LAPAROSCOPIC WITH DAVINCI ROBOT    Staff:  Surgeon(s):  Andrés Dietz MD    Assistant: Nkechi Siegel PA; Mariaelena Pineda PA; Aviva Stokes PA-C    Anesthesia: General    Estimated Blood Loss: 150 mL    Implants:    Nothing was implanted during the procedure    Specimen:          Specimens     ID Source Type Tests Collected By Collected At Frozen?      C Prostate Tissue · TISSUE PATHOLOGY EXAM   Andrés Dietz MD 1/10/20 4612      This specimen was not marked as sent.              Findings: Minimal intra-abdominal adhesions from previous bowel surgery    Complications: None    Description of Procedure: Patient is a 59-year-old gentleman with history of recently diagnosed prostate carcinoma Dimitri score of 7.  He had a PSA of 6.1.  On the left side he had 4 of 6 cores positive and 2 of the cores positive on the right.  Due to the high-grade disease of we discussed intervention.  He decided to proceed with robotic prostatectomy.  Risk discussed including bleeding infection and intra-abdominal injuries about associated organs.  We also discussed possible side effects including erectile dysfunction and urinary incontinence.  He understands wished to proceed.    Sequential compression garments were in place and function at time of induction.  General anesthesia was administered and a tap block performed per anesthesia.  He was placed in the modified lithotomy position.  The abdomen and genital areas prepped and draped in standard manner.  He had a right upper quadrant incision scar from his previous the abdominal surgery.  A Miramontes catheter was placed without  difficulty.  There was 0 Vicryl suture placed through the eyelet and tied in a loop for later traction purposes.  The pneumoperitoneum was established using the various technique just above the umbilicus.  The Veress needle entered without resistance and insufflation was performed.  The 12 mm trocar was placed with a 0 degree lenses just above the umbilicus.  The abdominal cavity was entered without difficulty.  There were no lateral adhesions.  Robotic trocar sites for were placed in standard format 2 on the left one on the right with an extreme right assistant port of 12 mm placed under visualization.  A second assistant port was placed in the right upper quadrant 5 mm.  Patient was placed in steep reverse Trendelenburg position.  The robot was docked.  The bladder was taken down in a standard manner.  Lateral to the lateral ligaments.  This was carried back to the level of the vas deferens.  A section of vas was taken on the right side and used for buttress the anastomotic suture.  Space of Retzius was developed.  He had a very sticky adipose tissue but the bladder was retracted flat with the retraction arm.  The prostate was defatted and the endopelvic fascia incised bilaterally.  Puboprostatic ligaments were taken down sharply under visualization.  The neurovascular bundle was identified on the right side and seem to have a nice plane and was teased laterally.  The urethra was exposed lateral bilaterally and then dorsal vein stitch placed with 0 Vicryl passed twice.  Attention was then turned to the bladder neck.  This was identified and incised and opened anteriorly.  A bladder was opened laterally and the ureteral orifice ease identified.  He had no significant median lobe.  A nice plane was developed posteriorly.  Large long segment of the below the vas was mobilized dissected and sealed and divided.  This was then used to a retraction anteriorly.  The seminal vesicles bilaterally were dissected to their  apices.  An obvious fascia was incised and a nice plane developed between the prostate and rectum.  Lateral pedicles were then carefully taken down with the da Greg vessel sealer.  Seem to be some stickiness posteriorly over the rectum down toward the apex and this was not taken down all the way with the vessel sealer.  Attention was then turned anteriorly where the dorsal vein complex was divided with electrocautery and then the urethra mobilized circumferentially.  This was then divided sharply and the final attachments of the apex carefully divided after dividing the urethral plate.  The specimen was released.  It was placed in Endo Catch bag.  The bladder neck was then refashion using 2-0 Vicryl suture down to a nice stoma approximately centimeter in diameter.  This was then brought down with using a double-armed 2-0 Monocryl suture 3 stitches were placed on each side of the anastomosis in the bladder allowed to distend into the pelvis.  Catheter was guided into the bladder.  With tension on the anastomotic suture there was no leakage.  These were then tied.  Catheter was secured.  Flat Nuno-Arenas drain was then placed into the pelvis.  The robot was undocked.  It should be noted that he had a very large deep pelvis and a very high obstructing symphysis pubis.  Technically the procedure was prolonged due to his anatomy.  He did also have an abundance of pelvic fat which seem to obstruct viewing at times.  Patient taught procedure well.  The specimen was extracted through the umbilical port.  All trochars were removed.  The umbilical incision was closed with a running 0 PDS.  Subcutaneous tissues approximated 2-0 Vicryl.  Dermabond was placed on all incisions.  Patient was converted back to supine position transferred to postop recovery in stable condition.        Andrés Dietz MD     Date: 1/10/2020  Time: 5:16 PM

## 2020-01-10 NOTE — ANESTHESIA POSTPROCEDURE EVALUATION
Patient: Tyrell Guzmán    Procedure Summary     Date:  01/10/20 Room / Location:   [facility]    Anesthesia Start:  1317 Anesthesia Stop:      Procedure:  PROSTATECTOMY LAPAROSCOPIC WITH DAVINCI ROBOT (N/A Abdomen) Diagnosis:      Surgeon:  Andrés Dietz MD Provider:  Karlo Lenz MD    Anesthesia Type:  general ASA Status:  3          Anesthesia Type: general    Vitals  No vitals data found for the desired time range.          Post Anesthesia Care and Evaluation    Patient location during evaluation: PACU  Patient participation: complete - patient participated  Level of consciousness: awake and alert  Pain score: 0  Pain management: adequate  Airway patency: patent  Anesthetic complications: No anesthetic complications  PONV Status: none  Cardiovascular status: hemodynamically stable and acceptable  Respiratory status: nonlabored ventilation, acceptable and nasal cannula  Hydration status: acceptable

## 2020-01-10 NOTE — H&P
Above is noted, agree.  Admission HP     Patient Name: Tyrell Guzmán  MRN: [id number]  : 1960  DOS: 1/10/2020    Attending: Andrés Dietz MD    Primary Care Provider: LAURO Avila MD      Patient Care Team:  LAURO Avila MD as PCP - General  LAURO Avila MD as PCP - Family Medicine    Chief complaint:  Prostate cancer    Subjective   Patient is a 59 y.o. male presented for scheduled surgery by Dr. Dietz. He anticipates a prostatectomy today. He had a second prostate bx d/t increasing PSA levels in Dec 2019.    When seen preop he is doing well. He denies pain or other complaints. He denies nausea, shortness of breath or chest pain. No hx of DVT or PE.      Allergies:  Allergies   Allergen Reactions   • Lisinopril Cough       Meds:  Medications Prior to Admission   Medication Sig Dispense Refill Last Dose   • B Complex Vitamins (VITAMIN B COMPLEX) capsule capsule Take 1 capsule by mouth Daily.   2020 at 0700   • hydrochlorothiazide (HYDRODIURIL) 25 MG tablet TAKE 1 TABLET DAILY (Patient taking differently: Take 25 mg by mouth Daily.) 90 tablet 3 1/10/2020 at 0900   • losartan (COZAAR) 100 MG tablet TAKE 1 TABLET DAILY (Patient taking differently: Take 100 mg by mouth Every Morning.) 90 tablet 3 2020 at 0700   • Multiple Vitamins-Minerals (CENTRUM SILVER 50+MEN) tablet Take 1 tablet by mouth Daily.   2020 at 0700   • SITagliptin (JANUVIA) 100 MG tablet Take 100 mg by mouth Daily.   1/10/2020 at 0900   • triamterene-hydrochlorothiazide (MAXZIDE) 75-50 MG per tablet Take 1 tablet by mouth Daily. 90 tablet 0 1/10/2020 at 0900       History:   Past Medical History:   Diagnosis Date   • Abnormal EKG    • Bilateral lower extremity edema.    • Colon cancer (CMS/HCC)    • Colon cancer (CMS/HCC)    • CPAP (continuous positive airway pressure) dependence    • Diabetes mellitus (CMS/HCC)    • Enlarged prostate    • History of chemotherapy     x6 months.   • Hypertension    • Obesity     • Prostate cancer (CMS/HCC)    • Sleep apnea    • Wears glasses      Past Surgical History:   Procedure Laterality Date   • COLECTOMY PARTIAL / TOTAL     • COLONOSCOPY  2017   • TUMOR REMOVAL Right 2016    Shoulder- Benign   • VASECTOMY       Family History   Problem Relation Age of Onset   • Hyperlipidemia Other    • Heart disease Mother    • Diabetes Father    • Stroke Father    • Cancer Father    • Heart disease Father    • Hypertension Father      Social History     Tobacco Use   • Smoking status: Never Smoker   • Smokeless tobacco: Never Used   Substance Use Topics   • Alcohol use: Yes     Comment:  being a social drinker; 1 drink a week    • Drug use: No   He is  with 2 children. He works in [place].     Review of Systems  All systems were reviewed and negative except for:  Respiratory: positive for  ZAMORA    Vital Signs  Visit Vitals  /64 (BP Location: Right arm, Patient Position: Lying)   Pulse 64   Temp 97.5 °F (36.4 °C) (Tympanic)   Resp 16   SpO2 95%       Physical Exam:    General Appearance:    Alert, cooperative, in no acute distress   Head:    Normocephalic, without obvious abnormality, atraumatic   Eyes:            Lids and lashes normal, conjunctivae and sclerae normal, no   icterus, no pallor, corneas clear   Ears:    Ears appear intact with no abnormalities noted   Neck:   No adenopathy, supple, trachea midline, no thyromegaly, no     carotid bruit, no JVD   Lungs:     Clear to auscultation,respirations regular, even and                   unlabored    Heart:    Regular rhythm and normal rate, normal S1 and S2, no            murmur, no gallop, no rub, no click   Abdomen:     Normal bowel sounds, no masses, no organomegaly, soft        non-tender, non-distended, no guarding, no rebound                 tenderness   Genitalia:    Deferred   Extremities:   Moves all extremities well, no edema, no cyanosis, no              redness   Pulses:   Pulses palpable and equal bilaterally    Skin:   No bleeding, bruising or rash   Neurologic:   Cranial nerves 2 - 12 grossly intact, sensation intact       Results from last 7 days   Lab Units 01/10/20  1137   POTASSIUM mmol/L 3.6     Lab Results   Component Value Date    HGBA1C 7.30 (H) 01/03/2020     Results for HARISH HIRSCH (MRN [id number]) as of 1/10/2020 15:38   Ref. Range 10/5/2018 07:37   Glucose Latest Ref Range: 70 - 100 mg/dL 144 (H)   Sodium Latest Ref Range: 132 - 146 mmol/L 137   Potassium Latest Ref Range: 3.5 - 5.5 mmol/L 3.8   CO2 Latest Ref Range: 20.0 - 31.0 mmol/L 29.0   Chloride Latest Ref Range: 99 - 109 mmol/L 102   Anion Gap Latest Ref Range: 3.0 - 11.0 mmol/L 6.0   Creatinine Latest Ref Range: 0.60 - 1.30 mg/dL 1.20   BUN Latest Ref Range: 9 - 23 mg/dL 19   BUN/Creatinine Ratio Latest Ref Range: 7.0 - 25.0  15.8   Calcium Latest Ref Range: 8.7 - 10.4 mg/dL 9.1   eGFR  Am Latest Ref Range: >60 mL/min/1.73 76     Results for HARISH HIRSCH (MRN [id number]) as of 1/10/2020 15:38   Ref. Range 1/3/2020 09:14   WBC Latest Ref Range: 3.40 - 10.80 10*3/mm3 7.44   RBC Latest Ref Range: 4.14 - 5.80 10*6/mm3 5.48   Hemoglobin Latest Ref Range: 13.0 - 17.7 g/dL 15.1   Hematocrit Latest Ref Range: 37.5 - 51.0 % 45.8   RDW Latest Ref Range: 12.3 - 15.4 % 13.0   MCV Latest Ref Range: 79.0 - 97.0 fL 83.6   MCH Latest Ref Range: 26.6 - 33.0 pg 27.6   MCHC Latest Ref Range: 31.5 - 35.7 g/dL 33.0   MPV Latest Ref Range: 6.0 - 12.0 fL 9.9   Platelets Latest Ref Range: 140 - 450 10*3/mm3 244     Assessment and Plan:     Prostate cancer (CMS/HCC)    Obstructive apnea    Essential hypertension    Obesity    Diabetes (CMS/[facility])      Plan  1. Ambulation  2. Pain control-prns   3. IS-encourage  4. DVT proph- [facility]  5. Bowel regimen  6. Resume home medications as appropriate  7. Monitor post-op labs  8. Discharge planning for home   9. Diet/IVF per surgeon    HTN  - Continue home cozaar  - hold HCTZ  - Monitor BP   - Holding  parameters for BP meds  - Labetalol PRN for SBP>170    DIPIKA  - CPAP at night    DM  - hgb A1c on 1/3/2020 7.3  - Hold OHA  - Accu-Chek AC and HS with low dose SSI      Renata Sweeney, APRN  01/10/20  3:43 PM     Above is noted, agree.  Mr. Guzmán underwent laparoscopic robotic prostatectomy by Dr. Andrés Harris, he tolerated surgery well and was admitted for further management.  Seen in his room after surgery, he is doing well with good pain control, no nausea, vomiting, or shortness of breath.  He ambulated from stretcher to the bed.  Vital signs, heart rate 68 respiratory rate 20 and blood pressure 102/62  Awake alert and oriented, not in distress.  HEENT: Atraumatic normocephalic, extraocular movements intact, mucous membranes moist.  Neck: Supple, no JVD or lymphadenopathy.  Chest: Equal expansion and symmetric, no chest wall tenderness.  Lungs: Clear to auscultation bilaterally, no wheezes or rales.  Abdomen: Soft, clean incisions.  ANDERSON drain present with slight oozing around.  Serosanguineous drainage in the bulb.  Benign exam.  Extremities: No clubbing, cyanosis or edema.    Assessment: status post robotic laparoscopic prostatectomy for prostate cancer.  Plan: Discussed postoperative management plan with patient and family.  They expressed understanding and agreement.  I asked nursing to make sure patient has an incentive spirometer in the room.  Encouraged him to ambulate.

## 2020-01-10 NOTE — ANESTHESIA PREPROCEDURE EVALUATION
Anesthesia Evaluation     Patient summary reviewed and Nursing notes reviewed   NPO Solid Status: > 8 hours             Airway   Mallampati: II  TM distance: >3 FB  Neck ROM: full  No difficulty expected  Dental      Pulmonary    (+) sleep apnea on CPAP,   (-) COPD, asthma, recent URI, not a smoker  Cardiovascular     ECG reviewed    (+) hypertension,   (-) past MI, dysrhythmias, angina, hyperlipidemia    ROS comment: ECG SB T abn poss AL isch   [facility] cleared him for Surgery     EF = 60%.  Abnormal baseline EKG which became more abnormal with exercise   No evidence of inducible ischemia by clinical or echocardiographic criteria.    Neuro/Psych  (-) seizures, CVA  GI/Hepatic/Renal/Endo    (+) morbid obesity,  diabetes mellitus,   (-)  obesity    Musculoskeletal     Abdominal    Substance History      OB/GYN          Other      history of cancer (sp colon [id number] 10 years ago [facility] )                    Anesthesia Plan    ASA 3     general   (TAPblocks?  Propofol infusion,  Opiate sparing  )  intravenous induction     Anesthetic plan, all risks, benefits, and alternatives have been provided, discussed and informed consent has been obtained with: patient.    Plan discussed with CRNA.

## 2020-01-10 NOTE — H&P
Pre-Op H&P  Tyrell Guzmán  [id number]  1960    Chief complaint: Prostate cancer    HPI:    12/20/19 office visit:  patient is here for initial visit with me to discuss treatment of recently diagnosed prostate carcinoma. He is here today with his wife. on the left side he had 4 cores of 6 positive for [place] score of 7. He had 1 core positive on the right. his PSA was 6.1. He had CT scan of the abdomen and pelvis which revealed no adenopathy. It did reveal some tiny renal cysts bilaterally. We discussed treatment options. He is already decided upon surgery and we discussed at length robotic-assisted laparoscopic radical prostatectomy. 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 This was performed laparoscopically. We discussed the potential of intra-abdominal adhesions complicating his surgical approach. We discussed risk of bleeding and infection. We discussed the possibility of intra-abdominal injury of an associated organs. We discussed convalescence. He and his wife understand and wish to proceed.    1/10/20:  Here today to undergo laparoscopic prostatectomy with davinci robot    Review of Systems:  General ROS: negative for chills, fever or skin lesions;  No changes since last office visit  Cardiovascular ROS: no chest pain or dyspnea on exertion  Respiratory ROS: no cough, shortness of breath, or wheezing    Allergies:   Allergies   Allergen Reactions   • Lisinopril Cough       Home Meds:    No current facility-administered medications on file prior to encounter.      Current Outpatient Medications on File Prior to Encounter   Medication Sig Dispense Refill   • B Complex Vitamins (VITAMIN B COMPLEX) capsule capsule Take 1 capsule by mouth Daily.     • hydrochlorothiazide (HYDRODIURIL) 25 MG tablet TAKE 1 TABLET DAILY (Patient taking differently: Take 25 mg by mouth Daily.) 90 tablet 3   • losartan (COZAAR) 100 MG tablet TAKE 1 TABLET DAILY (Patient taking  differently: Take 100 mg by mouth Every Morning.) 90 tablet 3   • Multiple Vitamins-Minerals (CENTRUM SILVER 50+MEN) tablet Take 1 tablet by mouth Daily.     • SITagliptin (JANUVIA) 100 MG tablet Take 100 mg by mouth Daily.     • triamterene-hydrochlorothiazide (MAXZIDE) 75-50 MG per tablet Take 1 tablet by mouth Daily. 90 tablet 0       PMH:   Past Medical History:   Diagnosis Date   • Abnormal EKG    • Bilateral lower extremity edema.    • Colon cancer (CMS/HCC)    • Colon cancer (CMS/HCC)    • CPAP (continuous positive airway pressure) dependence    • Diabetes mellitus (CMS/HCC)    • Enlarged prostate    • History of chemotherapy     x6 months.   • Hypertension    • Obesity    • Prostate cancer (CMS/HCC)    • Sleep apnea    • Wears glasses      PSH:    Past Surgical History:   Procedure Laterality Date   • COLECTOMY PARTIAL / TOTAL     • COLONOSCOPY  2017   • TUMOR REMOVAL Right 2016    Shoulder- Benign   • VASECTOMY       Social History:   Tobacco:   Social History     Tobacco Use   Smoking Status Never Smoker   Smokeless Tobacco Never Used      Alcohol:     Social History     Substance and Sexual Activity   Alcohol Use Yes    Comment:  being a social drinker; 1 drink a week        Vitals:           /64 (BP Location: Right arm, Patient Position: Lying)   Pulse 64   Temp 97.5 °F (36.4 °C) (Tympanic)   Resp 16   SpO2 95%     Physical Exam:  General Appearance:    Alert, cooperative, no distress, appears stated age   Head:    Normocephalic, without obvious abnormality, atraumatic   Lungs:     Clear to auscultation bilaterally, respirations unlabored    Heart:   Regular rate and rhythm, S1 and S2 normal, no murmur, rub    or gallop    Abdomen:    Soft, non-tender.  +bowel sounds   Breast Exam:    deferred   Genitalia:    deferred   Extremities:   Extremities normal, atraumatic, no cyanosis or edema   Skin:   Skin color, texture, turgor normal, no rashes or lesions   Neurologic:   Grossly intact   Results  Review  LABS:  Lab Results   Component Value Date    WBC 7.44 01/03/2020    HGB 15.1 01/03/2020    HCT 45.8 01/03/2020    MCV 83.6 01/03/2020     01/03/2020    NEUTROABS 5.40 07/25/2018    GLUCOSE 144 (H) 10/05/2018    BUN 19 10/05/2018    CREATININE 1.20 10/05/2018    EGFRIFAFRI 76 10/05/2018     10/05/2018    K 3.8 10/05/2018     10/05/2018    CO2 29.0 10/05/2018    CALCIUM 9.1 10/05/2018    ALBUMIN 4.33 07/25/2018    AST 23 07/25/2018    ALT 31 07/25/2018    BILITOT 0.6 07/25/2018       RADIOLOGY:  Imaging Results (Last 72 Hours)     ** No results found for the last 72 hours. **          I reviewed the patient's new clinical results.    Cancer Staging (if applicable)  Cancer Patient: _x_ yes __no __unknown; If yes, clinical stage T:__ N:__M:__, stage group or __N/A    Impression: Malignant tumor of prostate    Plan: Laparoscopic prostatectomy with Davinci robot    Radha Diaz, APRN   1/10/2020   11:46 AM

## 2020-01-10 NOTE — ANESTHESIA PROCEDURE NOTES
Airway  Urgency: elective    Date/Time: 1/10/2020 1:27 PM  Airway not difficult    General Information and Staff    Patient location during procedure: OR  CRNA: Claribel Pires CRNA    Indications and Patient Condition  Indications for airway management: airway protection    Preoxygenated: yes  MILS not maintained throughout  Mask difficulty assessment: 3 - difficult mask (inadequate, unstable or two providers) +/- NMBA    Final Airway Details  Final airway type: endotracheal airway      Successful airway: ETT  Cuffed: yes   Successful intubation technique: video laryngoscopy  Facilitating devices/methods: intubating stylet  Endotracheal tube insertion site: oral  Blade: Reis (elective Reis)  Blade size: 4  ETT size (mm): 7.5  Cormack-Lehane Classification: grade I - full view of glottis  Placement verified by: chest auscultation and capnometry   Measured from: lips  ETT/EBT  to lips (cm): 22  Number of attempts at approach: 1  Assessment: lips, teeth, and gum same as pre-op and atraumatic intubation    Additional Comments  Negative epigastric sounds, Breath sound equal bilaterally with symmetric chest rise and fall

## 2020-01-10 NOTE — ANESTHESIA PROCEDURE NOTES
Peripheral Block      Patient reassessed immediately prior to procedure    Patient location during procedure: OR  Start time: 1/10/2020 1:24 PM  Stop time: 1/10/2020 1:33 PM  Reason for block: at surgeon's request and post-op pain management  Performed by  Anesthesiologist: Karlo Lenz MD  Preanesthetic Checklist  Completed: patient identified, site marked, surgical consent, pre-op evaluation, timeout performed, IV checked, risks and benefits discussed and monitors and equipment checked  Prep:  Pt Position: supine  Sterile barriers:cap, gloves, sterile barriers and mask  Prep: ChloraPrep  Patient monitoring: blood pressure monitoring, continuous pulse oximetry and EKG  Procedure  Sedation:yes  Performed under: general  Guidance:ultrasound guided  Images:still images obtained, printed/placed on chart    Laterality:Bilateral  Block Type:TAP  Injection Technique:single-shot  Needle Type:short-bevel and echogenic  Needle Gauge:20 G  Resistance on Injection: none    Medications Used: buprenorphine (BUPRENEX) injection, 0.3 mg  dexamethasone sodium phosphate injection, 4 mg  bupivacaine PF (MARCAINE) 0.25 % injection, 60 mL  Med admintered at 1/10/2020 1:27 PM      Medications  Preservative Free Saline:40ml  Comment:Block Injection:  LA dose divided between Right and Left quad TAP blocks        Post Assessment  Injection Assessment: negative aspiration for heme, incremental injection and no paresthesia on injection  Patient Tolerance:comfortable throughout block  Complications:no  Additional Notes      Under Ultrasound guidance, a BBraun 4inch 360 degree needle was advanced with Normal Saline hydro dissection of tissue.  The Internal Oblique and Transversus Abdominus muscles where visualized.  At or before the aponeurosis of Internal Oblique, local anesthetic spread was visualized in the Transversus Abdominus Plane. Injection was made incrementally with aspiration every 5 mls.  There was no  intravascular injection,   injection pressure was normal, there was no neural injection, and the procedure was completed without difficulty.  Thank You.

## 2020-01-11 VITALS
HEART RATE: 88 BPM | TEMPERATURE: 98.2 F | DIASTOLIC BLOOD PRESSURE: 92 MMHG | WEIGHT: 315 LBS | BODY MASS INDEX: 46.65 KG/M2 | RESPIRATION RATE: 18 BRPM | SYSTOLIC BLOOD PRESSURE: 155 MMHG | OXYGEN SATURATION: 92 % | HEIGHT: 69 IN

## 2020-01-11 PROBLEM — Z90.79 STATUS POST PROSTATECTOMY: Status: ACTIVE | Noted: 2020-01-11

## 2020-01-11 LAB
ANION GAP SERPL CALCULATED.3IONS-SCNC: 13 MMOL/L (ref 5–15)
BUN BLD-MCNC: 15 MG/DL (ref 6–20)
BUN/CREAT SERPL: 14.6 (ref 7–25)
CALCIUM SPEC-SCNC: 9 MG/DL (ref 8.6–10.5)
CHLORIDE SERPL-SCNC: 99 MMOL/L (ref 98–107)
CO2 SERPL-SCNC: 23 MMOL/L (ref 22–29)
CREAT BLD-MCNC: 1.03 MG/DL (ref 0.76–1.27)
DEPRECATED RDW RBC AUTO: 40.8 FL (ref 37–54)
ERYTHROCYTE [DISTWIDTH] IN BLOOD BY AUTOMATED COUNT: 13.4 % (ref 12.3–15.4)
GFR SERPL CREATININE-BSD FRML MDRD: 90 ML/MIN/1.73
GLUCOSE BLD-MCNC: 168 MG/DL (ref 65–99)
GLUCOSE BLDC GLUCOMTR-MCNC: 155 MG/DL (ref 70–130)
GLUCOSE BLDC GLUCOMTR-MCNC: 179 MG/DL (ref 70–130)
HCT VFR BLD AUTO: 41.8 % (ref 37.5–51)
HGB BLD-MCNC: 14 G/DL (ref 13–17.7)
MCH RBC QN AUTO: 28.1 PG (ref 26.6–33)
MCHC RBC AUTO-ENTMCNC: 33.5 G/DL (ref 31.5–35.7)
MCV RBC AUTO: 83.8 FL (ref 79–97)
PLATELET # BLD AUTO: 247 10*3/MM3 (ref 140–450)
PMV BLD AUTO: 10.4 FL (ref 6–12)
POTASSIUM BLD-SCNC: 4 MMOL/L (ref 3.5–5.2)
RBC # BLD AUTO: 4.99 10*6/MM3 (ref 4.14–5.8)
SODIUM BLD-SCNC: 135 MMOL/L (ref 136–145)
WBC NRBC COR # BLD: 16.21 10*3/MM3 (ref 3.4–10.8)

## 2020-01-11 PROCEDURE — 94660 CPAP INITIATION&MGMT: CPT

## 2020-01-11 PROCEDURE — 25010000002 CEFOXITIN PER 1 G: Performed by: UROLOGY

## 2020-01-11 PROCEDURE — 82962 GLUCOSE BLOOD TEST: CPT

## 2020-01-11 PROCEDURE — 88344 IMHCHEM/IMCYTCHM EA MLT ANTB: CPT | Performed by: UROLOGY

## 2020-01-11 PROCEDURE — 80048 BASIC METABOLIC PNL TOTAL CA: CPT | Performed by: NURSE PRACTITIONER

## 2020-01-11 PROCEDURE — 94799 UNLISTED PULMONARY SVC/PX: CPT

## 2020-01-11 PROCEDURE — 25810000003 SODIUM CHLORIDE 0.9 % WITH KCL 20 MEQ 20-0.9 MEQ/L-% SOLUTION: Performed by: UROLOGY

## 2020-01-11 PROCEDURE — 85027 COMPLETE CBC AUTOMATED: CPT | Performed by: NURSE PRACTITIONER

## 2020-01-11 PROCEDURE — 88307 TISSUE EXAM BY PATHOLOGIST: CPT | Performed by: UROLOGY

## 2020-01-11 RX ORDER — DOCUSATE SODIUM 100 MG/1
100 CAPSULE, LIQUID FILLED ORAL DAILY
Start: 2020-01-11 | End: 2020-09-23

## 2020-01-11 RX ORDER — HYDROCODONE BITARTRATE AND ACETAMINOPHEN 7.5; 325 MG/1; MG/1
1 TABLET ORAL EVERY 6 HOURS PRN
Start: 2020-01-11 | End: 2020-09-23

## 2020-01-11 RX ORDER — NITROFURANTOIN 25; 75 MG/1; MG/1
100 CAPSULE ORAL 2 TIMES DAILY
Start: 2020-01-11 | End: 2020-07-27

## 2020-01-11 RX ADMIN — CEFOXITIN 2 G: 2 INJECTION, POWDER, FOR SOLUTION INTRAVENOUS at 06:05

## 2020-01-11 RX ADMIN — LOSARTAN POTASSIUM 100 MG: 50 TABLET, FILM COATED ORAL at 08:25

## 2020-01-11 RX ADMIN — GABAPENTIN 100 MG: 100 CAPSULE ORAL at 08:25

## 2020-01-11 RX ADMIN — INSULIN LISPRO 2 UNITS: 100 INJECTION, SOLUTION INTRAVENOUS; SUBCUTANEOUS at 08:26

## 2020-01-11 RX ADMIN — INSULIN LISPRO 2 UNITS: 100 INJECTION, SOLUTION INTRAVENOUS; SUBCUTANEOUS at 11:53

## 2020-01-11 RX ADMIN — ACETAMINOPHEN 650 MG: 325 TABLET ORAL at 08:25

## 2020-01-11 RX ADMIN — POTASSIUM CHLORIDE AND SODIUM CHLORIDE 100 ML/HR: 900; 150 INJECTION, SOLUTION INTRAVENOUS at 05:08

## 2020-01-11 RX ADMIN — ACETAMINOPHEN 650 MG: 325 TABLET ORAL at 03:15

## 2020-01-11 NOTE — PLAN OF CARE
Problem: Patient Care Overview  Goal: Plan of Care Review  Outcome: Ongoing (interventions implemented as appropriate)  Flowsheets (Taken 1/11/2020 1425)  Progress: improving  Plan of Care Reviewed With: patient  Note:   Pt doing well. ANDERSON removed today and son at bedside to drive patient home.  Goal: Individualization and Mutuality  Outcome: Ongoing (interventions implemented as appropriate)  Goal: Discharge Needs Assessment  Outcome: Ongoing (interventions implemented as appropriate)  Goal: Interprofessional Rounds/Family Conf  Outcome: Ongoing (interventions implemented as appropriate)     Problem: Fall Risk (Adult)  Goal: Identify Related Risk Factors and Signs and Symptoms  Outcome: Ongoing (interventions implemented as appropriate)  Goal: Absence of Fall  Outcome: Ongoing (interventions implemented as appropriate)  Flowsheets (Taken 1/11/2020 1427)  Absence of Fall: achieves outcome     Problem: Prostatectomy, Radical (Adult)  Goal: Signs and Symptoms of Listed Potential Problems Will be Absent, Minimized or Managed (Prostatectomy, Radical)  Outcome: Ongoing (interventions implemented as appropriate)  Flowsheets (Taken 1/11/2020 1429)  Problems Assessed (Radical Prostatectomy): all  Problems Present (Radical Prostatectomy): none  Goal: Anesthesia/Sedation Recovery  Outcome: Ongoing (interventions implemented as appropriate)

## 2020-01-11 NOTE — PLAN OF CARE
Problem: Patient Care Overview  Goal: Plan of Care Review  Flowsheets  Taken 1/11/2020 0505  Progress: no change  Outcome Summary: Pt brought to floor post op. Miramontes catheter in place, ANDERSON drain RLQ. ANDERSON dressing changed. IS amd ambulation encouraged. 2L NC while awake and CPAP at night. Pt states pain is much improved. Tolerated clear liquid diet very well. Fall precautions in place. Will continue to monitor.  Taken 1/10/2020 2200  Plan of Care Reviewed With: patient

## 2020-01-11 NOTE — DISCHARGE SUMMARY
Patient Name: Tyrell Guzmán  MRN: [id number]  : 1960  DOS: 2020    Attending: Andrés Dietz MD    Primary Care Provider: LAURO Avila MD    Date of Admission:.1/10/2020 10:40 AM    Date of Discharge:  2020    Discharge Diagnosis:       Status post prostatectomy, lap robotic    Obstructive apnea    Essential hypertension    Obesity    Prostate cancer (CMS/HCC)    Diabetes (CMS/[facility])      Hospital Course      At admit: (Patient is a 59 y.o. male presented for scheduled surgery by Dr. Dietz. He anticipates a prostatectomy today. He had a second prostate bx d/t increasing PSA levels in Dec 2019.)    He underwent RALPx by  under GA and TAP  Block. Tolerated surgery well.     Following his surgery he was admitted to the hospital, he was provided pain medication as needed for pain control, he received DVT prophylaxis with   mechanicals.    His home medications were resumed as appropriate, he was started on clear liquid diet , which was advanced and tolerated.    He was provided a bowel regimen and was encouraged to ambulate frequently which he did.    He had a ANDERSON drain postoperatively which has since been removed.  He continues to have a Miramontes catheter in place which he will keep attached to a leg bag until his follow-up appointment with urology.    When I saw him today he is doing quite well and he is ready for discharge home.    Procedures Performed  Procedure(s):  PROSTATECTOMY LAPAROSCOPIC WITH DAVINCI ROBOT       Pertinent Test Results:    I reviewed the patient's new clinical results.   Results from last 7 days   Lab Units 20  0520   WBC 10*3/mm3 16.21*   HEMOGLOBIN g/dL 14.0   HEMATOCRIT % 41.8   PLATELETS 10*3/mm3 247     Results from last 7 days   Lab Units 20  0520 01/10/20  1137   SODIUM mmol/L 135*  --    POTASSIUM mmol/L 4.0 3.6   CHLORIDE mmol/L 99  --    CO2 mmol/L 23.0  --    BUN mg/dL 15  --    CREATININE mg/dL 1.03  --    CALCIUM mg/dL 9.0  --   "  GLUCOSE mg/dL 168*  --      I reviewed the patient's new imaging including images and reports.      Discharge Assessment:       Visit Vitals  /92 (BP Location: Right arm, Patient Position: Lying)   Pulse 88   Temp 98.2 °F (36.8 °C) (Oral)   Resp 18   Ht 175.3 cm (69.02\")   Wt (!) 146 kg (322 lb 8.5 oz)   SpO2 92%   BMI 47.61 kg/m²     Temp (24hrs), Av.9 °F (36.6 °C), Min:97.3 °F (36.3 °C), Max:98.2 °F (36.8 °C)      General Appearance:    Alert, cooperative, in no acute distress   Lungs:     Clear to auscultation,respirations regular, even and                   unlabored    Heart:    Regular rhythm and normal rate, normal S1 and S2, no            murmur, no gallop, no rub, no click   Abdomen:     Soft, benign. Clean incisions. ANDERSON( out prior to dc)   Extremities:   Moves all extremities well, no edema, no cyanosis, no              redness   Pulses:   Pulses palpable and equal bilaterally              Discharge Disposition: Home.          Discharge Medications      New Medications      Instructions Start Date   docusate sodium 100 MG capsule  Commonly known as:  COLACE   100 mg, Oral, Daily      HYDROcodone-acetaminophen 7.5-325 MG per tablet  Commonly known as:  NORCO   1 tablet, Oral, Every 6 Hours PRN      nitrofurantoin (macrocrystal-monohydrate) 100 MG capsule  Commonly known as:  MACROBID   100 mg, Oral, 2 Times Daily         Changes to Medications      Instructions Start Date   losartan 100 MG tablet  Commonly known as:  COZAAR  What changed:  when to take this   TAKE 1 TABLET DAILY         Continue These Medications      Instructions Start Date   CENTRUM SILVER 50+MEN tablet   1 tablet, Oral, Daily      hydroCHLOROthiazide 25 MG tablet  Commonly known as:  HYDRODIURIL   TAKE 1 TABLET DAILY      JANUVIA 100 MG tablet  Generic drug:  SITagliptin   100 mg, Oral, Daily      triamterene-hydrochlorothiazide 75-50 MG per tablet  Commonly known as:  MAXZIDE   1 tablet, Oral, Daily      vitamin b complex " capsule capsule   1 capsule, Oral, Daily             Discharge Diet: Regular.    Activity at Discharge: Ambulate. No pulling, pushing or lifting.     Follow-up Appointments   per his orders.      Discussed with pt, family and RN.    Graciela Mccormick MD  01/11/20  10:38 AM

## 2020-01-11 NOTE — PROGRESS NOTES
"Urology    Patient Name: Tyrell Guzmán  Medical Record Number: [id number]  YOB: 1960     LOS: 1 day   Patient Care Team:  LAURO Avila MD as PCP - General  LAURO Avila MD as PCP - Family Medicine    Chief Complaint:  No chief complaint on file.      Subjective     Interval History:     He feels well postoperative day #1.  He ambulated some.  He tolerated a clear liquid diet.  He denies significant pain.    Review of Systems:    The following systems were reviewed and negative;  constitution, respiratory and cardiovascular    Objective     Vital Signs  /92 (BP Location: Right arm, Patient Position: Lying)   Pulse 88   Temp 98.2 °F (36.8 °C) (Oral)   Resp 18   Ht 175.3 cm (69.02\")   Wt (!) 146 kg (322 lb 8.5 oz)   SpO2 92%   BMI 47.61 kg/m²   I/O last 3 completed shifts:  In: 2000 [I.V.:2000]  Out: 50 [Drains:50]  I/O this shift:  In: -   Out: 2870 [Urine:2850; Drains:20]      Physical Exam:  General Appearance: No acute distress, sitting up in a chair, pleasant, appears comfortable  Lungs: Respirations regular, even and  unlabored  Abdomen: Obese, soft, nondistended, incisions intact without erythema or drainage, ANDERSON drain in place  Genital: Miramontes catheter intact with clear urine     Results Review:     I reviewed the patient's new clinical results.  Lab Results (last 24 hours)     Procedure Component Value Units Date/Time    Basic Metabolic Panel [[id number]]  (Abnormal) Collected:  01/11/20 0520    Specimen:  Blood Updated:  01/11/20 0738     Glucose 168 mg/dL      BUN 15 mg/dL      Creatinine 1.03 mg/dL      Sodium 135 mmol/L      Potassium 4.0 mmol/L      Chloride 99 mmol/L      CO2 23.0 mmol/L      Calcium 9.0 mg/dL      eGFR  African Amer 90 mL/min/1.73      BUN/Creatinine Ratio 14.6     Anion Gap 13.0 mmol/L     Narrative:       GFR Normal >60  Chronic Kidney Disease <60  Kidney Failure <15      POC Glucose Once [[id number]]  (Abnormal) Collected:  01/11/20 0715    " Specimen:  Blood Updated:  01/11/20 0718     Glucose 179 mg/dL     CBC (No Diff) [[id number]]  (Abnormal) Collected:  01/11/20 0520    Specimen:  Blood Updated:  01/11/20 0647     WBC 16.21 10*3/mm3      RBC 4.99 10*6/mm3      Hemoglobin 14.0 g/dL      Hematocrit 41.8 %      MCV 83.8 fL      MCH 28.1 pg      MCHC 33.5 g/dL      RDW 13.4 %      RDW-SD 40.8 fl      MPV 10.4 fL      Platelets 247 10*3/mm3     Tissue Pathology Exam [[id number]] Collected:  01/11/20 0407    Specimen:  Tissue from Prostate Updated:  01/11/20 0407    POC Glucose Once [[id number]]  (Abnormal) Collected:  01/10/20 2120    Specimen:  Blood Updated:  01/10/20 2124     Glucose 285 mg/dL     POC Glucose Once [[id number]]  (Abnormal) Collected:  01/10/20 1742    Specimen:  Blood Updated:  01/10/20 1746     Glucose 181 mg/dL     Potassium [[id number]]  (Normal) Collected:  01/10/20 1137    Specimen:  Blood Updated:  01/10/20 1152     Potassium 3.6 mmol/L     POC Glucose Once [[id number]]  (Normal) Collected:  01/10/20 1116    Specimen:  Blood Updated:  01/10/20 1122     Glucose 112 mg/dL           Medication Review:    Current Facility-Administered Medications:   •  acetaminophen (TYLENOL) tablet 650 mg, 650 mg, Oral, Q6H, 650 mg at 01/11/20 0825 **OR** acetaminophen (TYLENOL) 160 MG/5ML solution 650 mg, 650 mg, Oral, Q6H **OR** acetaminophen (TYLENOL) suppository 650 mg, 650 mg, Rectal, Q6H, Andrés Dietz MD  •  dextrose (D50W) 25 g/ 50mL Intravenous Solution 25 g, 25 g, Intravenous, Q15 Min PRN, Renata Sweeney APRN  •  dextrose (GLUTOSE) oral gel 15 g, 15 g, Oral, Q15 Min PRN, Renata Sweeney APRN  •  docusate sodium (COLACE) capsule 100 mg, 100 mg, Oral, BID PRN, Andrés Dietz MD  •  gabapentin (NEURONTIN) capsule 100 mg, 100 mg, Oral, TID, Andrés Dietz MD, 100 mg at 01/11/20 0825  •  glucagon (human recombinant) (GLUCAGEN DIAGNOSTIC) injection 1 mg, 1 mg, Subcutaneous, Q15 Min PRN, Renata Sweeney APRN  •   HYDROmorphone (DILAUDID) injection 0.5 mg, 0.5 mg, Intravenous, Q2H PRN, 0.5 mg at 01/10/20 1924 **AND** naloxone (NARCAN) injection 0.1 mg, 0.1 mg, Intravenous, Q5 Min PRN, Andrés Dietz MD  •  insulin lispro (humaLOG) injection 0-7 Units, 0-7 Units, Subcutaneous, 4x Daily With Meals & Nightly, Renata Sweeney APRN, 2 Units at 01/11/20 0826  •  labetalol (NORMODYNE,TRANDATE) injection 10 mg, 10 mg, Intravenous, Q4H PRN, Renata Sweeney APRN  •  losartan (COZAAR) tablet 100 mg, 100 mg, Oral, Daily, Andrés Dietz MD, 100 mg at 01/11/20 0825  •  ondansetron (ZOFRAN) injection 4 mg, 4 mg, Intravenous, Q6H PRN, Renata Sweeney APRN  •  oxyCODONE (ROXICODONE) immediate release tablet 5 mg, 5 mg, Oral, Q4H PRN, Andrés Dietz MD  •  sodium chloride 0.9 % bolus 500 mL, 500 mL, Intravenous, TID PRN, Renata Sweeney APRN  •  sodium chloride 0.9 % with KCl 20 mEq/L infusion, 100 mL/hr, Intravenous, Continuous, Andrés Dietz MD, Last Rate: 100 mL/hr at 01/11/20 0802, 100 mL/hr at 01/11/20 0802    Assessment and Plan    Prostate cancer status post robotic prostatectomy 1/10/2020.  He is doing well postoperative day #1.  He appears stable for discharge.  The ANDERSON drain will be removed.  He will go home with a Miramontes catheter and follow-up with Dr. Dietz in about 10 days.      Status post prostatectomy, lap robotic    Obstructive apnea    Essential hypertension    Obesity    Prostate cancer (CMS/HCC)    Diabetes (CMS/HCC)          Plan for disposition:Where: home and When:  today    Tyrell Fritz MD  01/11/20  10:11 AM

## 2020-01-15 LAB
CYTO UR: NORMAL
LAB AP CASE REPORT: NORMAL
LAB AP CLINICAL INFORMATION: NORMAL
LAB AP DIAGNOSIS COMMENT: NORMAL
PATH REPORT.FINAL DX SPEC: NORMAL
PATH REPORT.GROSS SPEC: NORMAL

## 2020-01-29 ENCOUNTER — TRANSCRIBE ORDERS (OUTPATIENT)
Dept: ADMINISTRATIVE | Facility: HOSPITAL | Age: 60
End: 2020-01-29

## 2020-01-29 DIAGNOSIS — C61 PROSTATE CANCER (HCC): Primary | ICD-10-CM

## 2020-02-03 ENCOUNTER — HOSPITAL ENCOUNTER (OUTPATIENT)
Dept: NUCLEAR MEDICINE | Facility: HOSPITAL | Age: 60
Discharge: HOME | End: 2020-02-03

## 2020-02-03 DIAGNOSIS — C61 PROSTATE CANCER (HCC): ICD-10-CM

## 2020-02-03 PROCEDURE — A9503 TC99M MEDRONATE: HCPCS | Performed by: UROLOGY

## 2020-02-03 PROCEDURE — 0 TECHNETIUM MEDRONATE KIT: Performed by: UROLOGY

## 2020-02-03 PROCEDURE — 78306 BONE IMAGING WHOLE BODY: CPT

## 2020-02-03 RX ORDER — TC 99M MEDRONATE 20 MG/10ML
26.4 INJECTION, POWDER, LYOPHILIZED, FOR SOLUTION INTRAVENOUS
Status: COMPLETED | OUTPATIENT
Start: 2020-02-03 | End: 2020-02-03

## 2020-02-03 RX ADMIN — Medication 26.4 MILLICURIE: at 10:00

## 2020-02-13 RX ORDER — TRIAMTERENE AND HYDROCHLOROTHIAZIDE 75; 50 MG/1; MG/1
TABLET ORAL
Qty: 90 TABLET | Refills: 2 | Status: SHIPPED | OUTPATIENT
Start: 2020-02-13 | End: 2020-11-09

## 2020-03-19 ENCOUNTER — OFFICE VISIT (OUTPATIENT)
Dept: RADIATION ONCOLOGY | Facility: HOSPITAL | Age: 60
End: 2020-03-19

## 2020-03-19 ENCOUNTER — HOSPITAL ENCOUNTER (OUTPATIENT)
Dept: RADIATION ONCOLOGY | Facility: HOSPITAL | Age: 60
Setting detail: RADIATION/ONCOLOGY SERIES
Discharge: HOME | End: 2020-03-19

## 2020-03-19 VITALS
OXYGEN SATURATION: 97 % | HEIGHT: 71 IN | WEIGHT: 315 LBS | RESPIRATION RATE: 18 BRPM | DIASTOLIC BLOOD PRESSURE: 79 MMHG | HEART RATE: 64 BPM | BODY MASS INDEX: 44.1 KG/M2 | TEMPERATURE: 98.4 F | SYSTOLIC BLOOD PRESSURE: 168 MMHG

## 2020-03-19 DIAGNOSIS — C61 PROSTATE CANCER (HCC): Primary | ICD-10-CM

## 2020-03-19 PROCEDURE — G0463 HOSPITAL OUTPT CLINIC VISIT: HCPCS

## 2020-03-19 NOTE — PROGRESS NOTES
CONSULTATION NOTE    NAME:      Tyrell Guzmán  :                                                          1960  DATE OF CONSULTATION:                       3/19/2020   REQUESTING PHYSICIAN:                   Andrés Dietz MD  REASON FOR CONSULTATION:           Prostate cancer (CMS/[facility])  Clinical- Stage IIC (cT1c, cN0, cM0, PSA: 6.1, Grade Group: 3)  Pathologic- Stage Unknown (pT3b, pNX, cM0, PSA: 6.1, Grade Group: 3)         BRIEF HISTORY:  Tyrell Guzmán  is a very pleasant 59 y.o. male  with recently diagnosed prostate cancer, status post prostatectomy with adverse feature of seminal vesicle invasion.  He was referred for adjuvant treatment.    He also has a history of resected stage III colon cancer status post laparoscopic partial colectomy 2010 followed by adjuvant FOLFOX chemotherapy.  He has remained in long-term remission from his colorectal cancer.    Patient was noted to have an elevated PSA value of maximum 6.1 ng/mL 2019.  After one negative biopsy of the prostate, repeat biopsy found Broken Bow 7 prostatic adenocarcinoma involving 4 out of 6 cores in the left lobe and smaller volume disease on the right.    Preoperative CT abdomen pelvis showed no evidence of extraprostatic extension and confirmed no radiographic evidence of recurrent colorectal cancer.  CEA values have remained low.    Patient chose to undergo definitive treatment with robotic prostatectomy performed by Dr. Dietz 2020.  Final surgical pathology showed prostatic acinar adenocarcinoma, Bro[place] Bow score 4+3 = 7, involving 5% of the right lobe and 10% of the left lobe.  There was some evidence of cribriform pattern but no Broken Bow's pattern 5 identified.  There was extensive prostatic intraepithelial neoplasia.  Tumor invaded the left seminal vesicle and extended to the soft tissue surgical margin associated with the left seminal vesicle.  The linear length of positive surgical margin was 6  mm.  All other surgical margins were negative malignancy.  No lymph nodes are identified.  There was no evidence of lymphovascular invasion or perineural invasion.    He has been healing well and has regained significant bladder control saturating less than 1 pad per day and no leakage at nighttime.    Recent postoperative PSA 2/19/2020 was undetectable with value less than 0.04 ng/mL.   Nuclear medicine bone scan 2/3/2020 showed degenerative changes but no evidence of bony metastatic disease.    Patient is back to work and active.    Allergies   Allergen Reactions   • Lisinopril Cough       Social History     Tobacco Use   • Smoking status: Never Smoker   • Smokeless tobacco: Never Used   Substance Use Topics   • Alcohol use: Yes     Comment:  being a social drinker; 1 drink a week    • Drug use: No       Past Medical History:   Diagnosis Date   • Abnormal EKG    • Bilateral lower extremity edema.    • Colon cancer (CMS/HCC)    • Colon cancer (CMS/HCC)    • CPAP (continuous positive airway pressure) dependence    • Diabetes mellitus (CMS/HCC)    • Enlarged prostate    • History of chemotherapy     x6 months.   • Hypertension    • Obesity    • Prostate cancer (CMS/HCC)    • Sleep apnea    • Wears glasses        family history includes Cancer in his father; Diabetes in his father; Heart disease in his father and mother; Hyperlipidemia in an other family member; Hypertension in his father; Stroke in his father.     Past Surgical History:   Procedure Laterality Date   • COLECTOMY PARTIAL / TOTAL     • COLONOSCOPY  2017   • PROSTATE SURGERY  01/10/2020   • PROSTATECTOMY N/A 1/10/2020    Procedure: PROSTATECTOMY LAPAROSCOPIC WITH DAVINCI ROBOT;  Surgeon: Andrés Dietz MD;  Location: [facility];  Service: [facility]   • TUMOR REMOVAL Right 2016    Shoulder- Benign   • VASECTOMY          Review of Systems   Eyes: Positive for eye problems (dry itchy eyes).   Genitourinary: Positive for difficulty urinating (weak  "stream) and frequency.    All other systems reviewed and are negative.          Objective   VITAL SIGNS:   Vitals:    03/19/20 1247   BP: 168/79   Pulse: 64   Resp: 18   Temp: 98.4 °F (36.9 °C)   TempSrc: Temporal   SpO2: 97%   Weight: (!) 146 kg (322 lb 12.8 oz)   Height: 180.3 cm (71\")   PainSc: 0-No pain        KPS        90%    Physical Exam   Constitutional: He is oriented to person, place, and time. He appears well-developed and well-nourished.   HENT:   Head: Normocephalic and atraumatic.   Neck: Normal range of motion. Neck supple.   Cardiovascular: Normal rate, regular rhythm and normal heart sounds.   No murmur heard.  Pulmonary/Chest: Effort normal and breath sounds normal. He has no wheezes. He has no rales.   Abdominal: Soft. Bowel sounds are normal. He exhibits no distension. There is no hepatosplenomegaly. There is no tenderness.   Musculoskeletal: Normal range of motion. He exhibits no edema or tenderness.   Lymphadenopathy:     He has no cervical adenopathy.     He has no axillary adenopathy.        Right: No supraclavicular adenopathy present.        Left: No supraclavicular adenopathy present.   Neurological: He is alert and oriented to person, place, and time. He has normal strength. No sensory deficit.   Skin: Skin is warm and dry.   Psychiatric: He has a normal mood and affect. His behavior is normal. Judgment and thought content normal.   Nursing note and vitals reviewed.               IPSS Questionnaire (AUA-7):  Over the past month…    1)  Incomplete Emptying  How often have you had a sensation of not emptying your bladder?  1 - Less than 1 time in 5   2)  Frequency  How often have you had to urinate less than every two hours? 1 - Less than 1 time in 5   3)  Intermittency  How often have you found you stopped and started again several times when you urinated?  1 - Less than 1 time in 5   4) Urgency  How often have you found it difficult to postpone urination?  1 - Less than 1 time in 5   5) " Weak Stream  How often have you had a weak urinary stream?  2 - Less than half the time   6) Straining  How often have you had to push or strain to begin urination?  0 - Not at all   7) Nocturia  How many times did you typically get up at night to urinate?  2 - 2 times   Total Score:  8       Quality of life due to urinary symptoms:  If you were to spend the rest of your life with your urinary condition the way it is now, how would you feel about that? 2-Mostly Satisfied   Urine Leakage (Incontinence) 1-Mild (A few drops a day, no pad use)     Sexual Health Inventory  Current Status    1)  How do you rate your confidence that you could achieve and keep an erection? 1-Very Low   2) When you had erections with sexual stimulation, how often were your erections hard enough for penetration (entering your partner)? 2-A few times (much less than half the time)   3)  During sexual intercourse, how often were you able to maintain your erection after you had penetrated (entered) into your partner? 2-A few times (much less than half the time)   4) During sexual intercourse, how difficult was it to maintain your erection to completion of intercourse? 2-Very difficult   5) When you attempted sexual intercourse, how often was it satisfactory to you? 2-A few times (much less than half the time)   Total Score: 9       Bowel Health Inventory  Current Status: 0-No problems, no rectal bleeding, no discharge, less then 5 bowel movements a day     The following portions of the patient's history were reviewed and updated as appropriate: allergies, current medications, past family history, past medical history, past social history, past surgical history and problem list.    Assessment      IMPRESSION: Prostate cancer, Dimitri score 4+3 = 7, clinical stage IIC (T1c, N0, M0), PSA 6.1 ng/ml.  Status post prostatectomy 1/11/2020 with final pathologic stage IIIB (T3b, Nx, M0).  2 months status post surgery he is healing well with mild residual  daytime stress incontinence.  PSA is undetectable.  Adjuvant radiotherapy is indicated for seminal vesicle invasion and positive surgical margin.  He could use a little extra time for healing.  We reviewed the role of radiotherapy in this situation.  Informed consent is been obtained.    Stage III colorectal cancer in extended remission after primary resection in 2010 followed by adjuvant chemotherapy.    RECOMMENDATIONS: He will return approximately 1 month for CT simulation.  The prostate bed and seminal vesicle bed will receive a dose of 70 Gray in 35 daily fractions of 2 Gray each using IMRT technique with concurrent simultaneous treatment of 54-63 Gray to the lower risk regions of lower pelvic lymphatics.    Return in about 4 weeks (around 4/16/2020) for Simulation.    There are no diagnoses linked to this encounter.          Kenyon Brand MD      Errors in dictation may reflect use of voice recognition software and not all errors in transcription may have been detected prior to signing.

## 2020-03-20 DIAGNOSIS — C61 PROSTATE CANCER (HCC): Primary | ICD-10-CM

## 2020-04-06 ENCOUNTER — LAB (OUTPATIENT)
Dept: LAB | Facility: HOSPITAL | Age: 60
End: 2020-04-06

## 2020-04-06 DIAGNOSIS — C61 PROSTATE CANCER (HCC): ICD-10-CM

## 2020-04-06 LAB — 25(OH)D3 SERPL-MCNC: 34.2 NG/ML (ref 30–100)

## 2020-04-06 PROCEDURE — 36415 COLL VENOUS BLD VENIPUNCTURE: CPT

## 2020-04-06 PROCEDURE — 82306 VITAMIN D 25 HYDROXY: CPT

## 2020-04-15 ENCOUNTER — TELEPHONE (OUTPATIENT)
Dept: RADIATION ONCOLOGY | Facility: HOSPITAL | Age: 60
End: 2020-04-15

## 2020-04-16 ENCOUNTER — OFFICE VISIT (OUTPATIENT)
Dept: RADIATION ONCOLOGY | Facility: HOSPITAL | Age: 60
End: 2020-04-16

## 2020-04-16 ENCOUNTER — HOSPITAL ENCOUNTER (OUTPATIENT)
Dept: RADIATION ONCOLOGY | Facility: HOSPITAL | Age: 60
Discharge: HOME | End: 2020-04-16

## 2020-04-16 ENCOUNTER — HOSPITAL ENCOUNTER (OUTPATIENT)
Dept: RADIATION ONCOLOGY | Facility: HOSPITAL | Age: 60
Setting detail: RADIATION/ONCOLOGY SERIES
Discharge: HOME | End: 2020-04-16

## 2020-04-16 VITALS
RESPIRATION RATE: 20 BRPM | WEIGHT: 315 LBS | TEMPERATURE: 98 F | SYSTOLIC BLOOD PRESSURE: 140 MMHG | DIASTOLIC BLOOD PRESSURE: 67 MMHG | BODY MASS INDEX: 44.76 KG/M2 | OXYGEN SATURATION: 97 % | HEART RATE: 64 BPM

## 2020-04-16 DIAGNOSIS — C61 PROSTATE CANCER (HCC): Primary | ICD-10-CM

## 2020-04-16 PROCEDURE — 77334 RADIATION TREATMENT AID(S): CPT | Performed by: RADIOLOGY

## 2020-04-16 PROCEDURE — G0463 HOSPITAL OUTPT CLINIC VISIT: HCPCS

## 2020-04-16 RX ORDER — TADALAFIL 20 MG/1
20 TABLET ORAL DAILY PRN
Qty: 20 TABLET | Refills: 5 | Status: SHIPPED | OUTPATIENT
Start: 2020-04-16 | End: 2020-07-27 | Stop reason: SDUPTHER

## 2020-04-16 NOTE — PROGRESS NOTES
RE-EVALUATION    PATIENT:                                                      Tyrell Guzmán  :                                                          1960  DATE:                          2020   DIAGNOSIS:     Prostate cancer (CMS/[facility])  - Stage IIC (cT1c, cN0, cM0, PSA: 6.1, Grade Group: 3)  - Stage Unknown (pT3b, pNX, cM0, PSA: 6.1, Grade Group: 3)         BRIEF HISTORY:  The patient is a very pleasant 59 y.o. male  with high risk prostate cancer, status post prostatectomy 3 months ago.  He is continued to heal and regain bladder control.  He is not ready to begin adjuvant radiotherapy.    Allergies   Allergen Reactions   • Lisinopril Cough       Review of Systems   Constitutional: Negative.    HENT:  Negative.    Eyes: Negative.    Respiratory: Negative.    Cardiovascular: Negative.    Gastrointestinal: Negative.    Endocrine: Negative.    Genitourinary: Negative.     Musculoskeletal: Negative.    Skin: Negative.    Neurological: Negative.    Hematological: Negative.    Psychiatric/Behavioral: Negative.            IPSS Questionnaire (AUA-7):  Over the past month…     1)  Incomplete Emptying  How often have you had a sensation of not emptying your bladder?  1 - Less than 1 time in 5   2)  Frequency  How often have you had to urinate less than every two hours? 1 - Less than 1 time in 5   3)  Intermittency  How often have you found you stopped and started again several times when you urinated?  1 - Less than 1 time in 5   4) Urgency  How often have you found it difficult to postpone urination?  1 - Less than 1 time in 5   5) Weak Stream  How often have you had a weak urinary stream?  2 - Less than half the time   6) Straining  How often have you had to push or strain to begin urination?  0 - Not at all   7) Nocturia  How many times did you typically get up at night to urinate?  2 - 2 times   Total Score:  8         Quality of life due to urinary symptoms:  If you were to spend the rest of your  life with your urinary condition the way it is now, how would you feel about that? 2-Mostly Satisfied   Urine Leakage (Incontinence) 1-Mild (A few drops a day, no pad use)      Sexual Health Inventory  Current Status     1)  How do you rate your confidence that you could achieve and keep an erection? 1-Very Low   2) When you had erections with sexual stimulation, how often were your erections hard enough for penetration (entering your partner)? 2-A few times (much less than half the time)   3)  During sexual intercourse, how often were you able to maintain your erection after you had penetrated (entered) into your partner? 2-A few times (much less than half the time)   4) During sexual intercourse, how difficult was it to maintain your erection to completion of intercourse? 2-Very difficult   5) When you attempted sexual intercourse, how often was it satisfactory to you? 2-A few times (much less than half the time)   Total Score: 9         Bowel Health Inventory  Current Status: 0-No problems, no rectal bleeding, no discharge, less then 5 bowel movements a day                   Objective   VITAL SIGNS:   Vitals:    04/16/20 1407   BP: 140/67   Pulse: 64   Resp: 20   Temp: 98 °F (36.7 °C)   SpO2: 97%   Weight: (!) 146 kg (320 lb 14.4 oz)   PainSc: 0-No pain        KPS 90%    Physical Exam   Constitutional: He is oriented to person, place, and time. He appears well-developed and well-nourished.   HENT:   Head: Normocephalic and atraumatic.   Neck: Normal range of motion.   Cardiovascular: Normal rate and regular rhythm.   Pulmonary/Chest: Effort normal.   Abdominal: Soft.   Musculoskeletal: Normal range of motion.   Neurological: He is alert and oriented to person, place, and time.   Skin: Skin is warm and dry.   Nursing note and vitals reviewed.           The following portions of the patient's history were reviewed and updated as appropriate: allergies, current medications, past family history, past social history,  past surgical history and problem list.    Diagnoses and all orders for this visit:    Prostate cancer (CMS/HCC)    Other orders  -     tadalafil (Cialis) 20 MG tablet; Take 1 tablet by mouth Daily As Needed for Erectile Dysfunction. 0.5 -1 tab daily prn as directed      IMPRESSION:  Prostate cancer, Dimitri score 4+3 = 7, clinical stage IIC (T1c, N0, M0), PSA 6.1 ng/ml.  Status post prostatectomy 1/11/2020 with final pathologic stage IIIB (T3b, Nx, M0).  3 months status post surgery he has continued to regain urinary continence.  PSA is undetectable.  Adjuvant radiotherapy is indicated for seminal vesicle invasion and positive surgical margin.    RECOMMENDATIONS: CT simulation will be performed today.  The prostate bed and the left seminal vesicle bed will receive 70 Gray over 7 weeks with concurrent treatment of lower risk regions of the pelvis to 54-63 Jerome.       Kenyon Brand MD

## 2020-04-20 RX ORDER — HYDROCHLOROTHIAZIDE 25 MG/1
25 TABLET ORAL DAILY
Qty: 90 TABLET | Refills: 3 | Status: SHIPPED | OUTPATIENT
Start: 2020-04-20 | End: 2020-05-01 | Stop reason: SDUPTHER

## 2020-04-27 PROCEDURE — 77338 DESIGN MLC DEVICE FOR IMRT: CPT | Performed by: RADIOLOGY

## 2020-04-27 PROCEDURE — 77301 RADIOTHERAPY DOSE PLAN IMRT: CPT | Performed by: RADIOLOGY

## 2020-04-27 PROCEDURE — 77300 RADIATION THERAPY DOSE PLAN: CPT | Performed by: RADIOLOGY

## 2020-04-30 ENCOUNTER — HOSPITAL ENCOUNTER (OUTPATIENT)
Dept: RADIATION ONCOLOGY | Facility: HOSPITAL | Age: 60
Discharge: HOME | End: 2020-04-30

## 2020-04-30 PROCEDURE — 77385: CPT | Performed by: RADIOLOGY

## 2020-05-01 ENCOUNTER — HOSPITAL ENCOUNTER (OUTPATIENT)
Dept: RADIATION ONCOLOGY | Facility: HOSPITAL | Age: 60
Setting detail: RADIATION/ONCOLOGY SERIES
Discharge: HOME | End: 2020-05-01

## 2020-05-01 ENCOUNTER — HOSPITAL ENCOUNTER (OUTPATIENT)
Dept: RADIATION ONCOLOGY | Facility: HOSPITAL | Age: 60
Discharge: HOME | End: 2020-05-01

## 2020-05-01 PROCEDURE — 77336 RADIATION PHYSICS CONSULT: CPT | Performed by: RADIOLOGY

## 2020-05-01 PROCEDURE — 77385: CPT | Performed by: RADIOLOGY

## 2020-05-01 RX ORDER — HYDROCHLOROTHIAZIDE 25 MG/1
25 TABLET ORAL DAILY
Qty: 90 TABLET | Refills: 3 | Status: SHIPPED | OUTPATIENT
Start: 2020-05-01 | End: 2021-05-27

## 2020-05-04 ENCOUNTER — HOSPITAL ENCOUNTER (OUTPATIENT)
Dept: RADIATION ONCOLOGY | Facility: HOSPITAL | Age: 60
Discharge: HOME | End: 2020-05-04

## 2020-05-04 PROCEDURE — 77385: CPT | Performed by: RADIOLOGY

## 2020-05-05 ENCOUNTER — HOSPITAL ENCOUNTER (OUTPATIENT)
Dept: RADIATION ONCOLOGY | Facility: HOSPITAL | Age: 60
Discharge: HOME | End: 2020-05-05

## 2020-05-05 VITALS — WEIGHT: 315 LBS | BODY MASS INDEX: 45.37 KG/M2

## 2020-05-05 PROCEDURE — 77385: CPT | Performed by: RADIOLOGY

## 2020-05-06 ENCOUNTER — DOCUMENTATION (OUTPATIENT)
Dept: NUTRITION | Facility: HOSPITAL | Age: 60
End: 2020-05-06

## 2020-05-06 ENCOUNTER — HOSPITAL ENCOUNTER (OUTPATIENT)
Dept: RADIATION ONCOLOGY | Facility: HOSPITAL | Age: 60
Discharge: HOME | End: 2020-05-06

## 2020-05-06 PROCEDURE — 77385: CPT | Performed by: RADIOLOGY

## 2020-05-06 NOTE — PROGRESS NOTES
[facility]    Diagnosis:  Stage IIC high risk prostate cancer  Surgery:  Prostatectomy 3 months prior  Radiation: Prostate bed and the left seminal vesicle bed will receive 70 Gray over 7 weeks with concurrent treatment of lower risk regions of the pelvis to 54-63 Jerome    Weight 325.4 lbs / BMI 44.8    Initial consultation with patient during status check visits discussing possible side effects of treatment including bowel changes (increased gas, frequency, consistency) and fatigue with tips for management.  At this time patient denies any existing issues necessitating intervention.  He verbalized good comprehension.  Will follow patient and also provide nutritional considerations for lifestyle changes and survivorship.

## 2020-05-07 ENCOUNTER — HOSPITAL ENCOUNTER (OUTPATIENT)
Dept: RADIATION ONCOLOGY | Facility: HOSPITAL | Age: 60
Discharge: HOME | End: 2020-05-07

## 2020-05-07 PROCEDURE — 77336 RADIATION PHYSICS CONSULT: CPT | Performed by: RADIOLOGY

## 2020-05-07 PROCEDURE — 77385: CPT | Performed by: RADIOLOGY

## 2020-05-08 ENCOUNTER — HOSPITAL ENCOUNTER (OUTPATIENT)
Dept: RADIATION ONCOLOGY | Facility: HOSPITAL | Age: 60
Discharge: HOME | End: 2020-05-08

## 2020-05-08 PROCEDURE — 77385: CPT | Performed by: RADIOLOGY

## 2020-05-11 ENCOUNTER — HOSPITAL ENCOUNTER (OUTPATIENT)
Dept: RADIATION ONCOLOGY | Facility: HOSPITAL | Age: 60
Discharge: HOME | End: 2020-05-11

## 2020-05-11 PROCEDURE — 77385: CPT | Performed by: RADIOLOGY

## 2020-05-12 ENCOUNTER — HOSPITAL ENCOUNTER (OUTPATIENT)
Dept: RADIATION ONCOLOGY | Facility: HOSPITAL | Age: 60
Discharge: HOME | End: 2020-05-12

## 2020-05-12 VITALS — WEIGHT: 315 LBS | BODY MASS INDEX: 45.52 KG/M2

## 2020-05-12 PROCEDURE — 77385: CPT | Performed by: RADIOLOGY

## 2020-05-13 ENCOUNTER — TELEPHONE (OUTPATIENT)
Dept: RADIATION ONCOLOGY | Facility: HOSPITAL | Age: 60
End: 2020-05-13

## 2020-05-13 ENCOUNTER — HOSPITAL ENCOUNTER (OUTPATIENT)
Dept: RADIATION ONCOLOGY | Facility: HOSPITAL | Age: 60
Discharge: HOME | End: 2020-05-13

## 2020-05-13 PROCEDURE — 77385: CPT | Performed by: RADIOLOGY

## 2020-05-13 NOTE — TELEPHONE ENCOUNTER
Pt's wife called and asked if I could give her an estimate on what he will owe? I explained I have nothing to do with any billing and she will need to contact our billing dept.  Wife verbalized understanding.

## 2020-05-14 ENCOUNTER — HOSPITAL ENCOUNTER (OUTPATIENT)
Dept: RADIATION ONCOLOGY | Facility: HOSPITAL | Age: 60
Discharge: HOME | End: 2020-05-14

## 2020-05-14 PROCEDURE — 77336 RADIATION PHYSICS CONSULT: CPT | Performed by: RADIOLOGY

## 2020-05-14 PROCEDURE — 77385: CPT | Performed by: RADIOLOGY

## 2020-05-15 ENCOUNTER — HOSPITAL ENCOUNTER (OUTPATIENT)
Dept: RADIATION ONCOLOGY | Facility: HOSPITAL | Age: 60
Discharge: HOME | End: 2020-05-15

## 2020-05-15 PROCEDURE — 77385: CPT | Performed by: RADIOLOGY

## 2020-05-18 ENCOUNTER — HOSPITAL ENCOUNTER (OUTPATIENT)
Dept: RADIATION ONCOLOGY | Facility: HOSPITAL | Age: 60
Discharge: HOME | End: 2020-05-18

## 2020-05-18 PROCEDURE — 77385: CPT | Performed by: RADIOLOGY

## 2020-05-19 ENCOUNTER — HOSPITAL ENCOUNTER (OUTPATIENT)
Dept: RADIATION ONCOLOGY | Facility: HOSPITAL | Age: 60
Discharge: HOME | End: 2020-05-19

## 2020-05-19 VITALS — BODY MASS INDEX: 44.84 KG/M2 | WEIGHT: 315 LBS

## 2020-05-19 PROCEDURE — 77385: CPT | Performed by: RADIOLOGY

## 2020-05-20 ENCOUNTER — HOSPITAL ENCOUNTER (OUTPATIENT)
Dept: RADIATION ONCOLOGY | Facility: HOSPITAL | Age: 60
Discharge: HOME | End: 2020-05-20

## 2020-05-20 PROCEDURE — 77385: CPT | Performed by: RADIOLOGY

## 2020-05-21 ENCOUNTER — HOSPITAL ENCOUNTER (OUTPATIENT)
Dept: RADIATION ONCOLOGY | Facility: HOSPITAL | Age: 60
Discharge: HOME | End: 2020-05-21

## 2020-05-21 PROCEDURE — 77336 RADIATION PHYSICS CONSULT: CPT | Performed by: RADIOLOGY

## 2020-05-21 PROCEDURE — 77385: CPT | Performed by: RADIOLOGY

## 2020-05-22 ENCOUNTER — HOSPITAL ENCOUNTER (OUTPATIENT)
Dept: RADIATION ONCOLOGY | Facility: HOSPITAL | Age: 60
Discharge: HOME | End: 2020-05-22

## 2020-05-22 PROCEDURE — 77385: CPT | Performed by: RADIOLOGY

## 2020-05-26 ENCOUNTER — DOCUMENTATION (OUTPATIENT)
Dept: NUTRITION | Facility: HOSPITAL | Age: 60
End: 2020-05-26

## 2020-05-26 ENCOUNTER — HOSPITAL ENCOUNTER (OUTPATIENT)
Dept: RADIATION ONCOLOGY | Facility: HOSPITAL | Age: 60
Discharge: HOME | End: 2020-05-26

## 2020-05-26 VITALS — WEIGHT: 315 LBS | BODY MASS INDEX: 44.24 KG/M2

## 2020-05-26 PROCEDURE — 77385: CPT | Performed by: RADIOLOGY

## 2020-05-26 NOTE — PROGRESS NOTES
[facility]     Diagnosis:  Stage IIC high risk prostate cancer  Surgery:  Prostatectomy 3 months prior  Radiation: Prostate bed and the left seminal vesicle bed will receive 70 Gray over 7 weeks with concurrent treatment of lower risk regions of the pelvis to 54-63 Jerome / has completed 18/35 treatments     Weight 317.2 lbs (no work boots on today)    Patient doing well with progression of treatment.  He denies fatigue, gas or changes in bowel habits.  Will continue to follow as indicated.

## 2020-05-27 ENCOUNTER — HOSPITAL ENCOUNTER (OUTPATIENT)
Dept: RADIATION ONCOLOGY | Facility: HOSPITAL | Age: 60
Discharge: HOME | End: 2020-05-27

## 2020-05-27 PROCEDURE — 77385: CPT | Performed by: RADIOLOGY

## 2020-05-28 ENCOUNTER — HOSPITAL ENCOUNTER (OUTPATIENT)
Dept: RADIATION ONCOLOGY | Facility: HOSPITAL | Age: 60
Discharge: HOME | End: 2020-05-28

## 2020-05-28 PROCEDURE — 77385: CPT | Performed by: RADIOLOGY

## 2020-05-29 ENCOUNTER — HOSPITAL ENCOUNTER (OUTPATIENT)
Dept: RADIATION ONCOLOGY | Facility: HOSPITAL | Age: 60
Discharge: HOME | End: 2020-05-29

## 2020-05-29 PROCEDURE — 77336 RADIATION PHYSICS CONSULT: CPT | Performed by: RADIOLOGY

## 2020-05-29 PROCEDURE — 77385: CPT | Performed by: RADIOLOGY

## 2020-06-01 ENCOUNTER — HOSPITAL ENCOUNTER (OUTPATIENT)
Dept: RADIATION ONCOLOGY | Facility: HOSPITAL | Age: 60
Discharge: HOME | End: 2020-06-01

## 2020-06-01 ENCOUNTER — HOSPITAL ENCOUNTER (OUTPATIENT)
Dept: RADIATION ONCOLOGY | Facility: HOSPITAL | Age: 60
Setting detail: RADIATION/ONCOLOGY SERIES
Discharge: HOME | End: 2020-06-01

## 2020-06-01 PROCEDURE — 77385: CPT | Performed by: RADIOLOGY

## 2020-06-02 ENCOUNTER — HOSPITAL ENCOUNTER (OUTPATIENT)
Dept: RADIATION ONCOLOGY | Facility: HOSPITAL | Age: 60
Discharge: HOME | End: 2020-06-02

## 2020-06-02 VITALS — WEIGHT: 315 LBS | BODY MASS INDEX: 44.91 KG/M2

## 2020-06-02 PROCEDURE — 77385: CPT | Performed by: RADIOLOGY

## 2020-06-03 ENCOUNTER — HOSPITAL ENCOUNTER (OUTPATIENT)
Dept: RADIATION ONCOLOGY | Facility: HOSPITAL | Age: 60
Discharge: HOME | End: 2020-06-03

## 2020-06-03 PROCEDURE — 77385: CPT | Performed by: RADIOLOGY

## 2020-06-04 ENCOUNTER — HOSPITAL ENCOUNTER (OUTPATIENT)
Dept: RADIATION ONCOLOGY | Facility: HOSPITAL | Age: 60
Discharge: HOME | End: 2020-06-04

## 2020-06-04 PROCEDURE — 77385: CPT | Performed by: RADIOLOGY

## 2020-06-05 ENCOUNTER — HOSPITAL ENCOUNTER (OUTPATIENT)
Dept: RADIATION ONCOLOGY | Facility: HOSPITAL | Age: 60
Discharge: HOME | End: 2020-06-05

## 2020-06-05 PROCEDURE — 77385: CPT | Performed by: RADIOLOGY

## 2020-06-05 PROCEDURE — 77336 RADIATION PHYSICS CONSULT: CPT | Performed by: RADIOLOGY

## 2020-06-08 ENCOUNTER — HOSPITAL ENCOUNTER (OUTPATIENT)
Dept: RADIATION ONCOLOGY | Facility: HOSPITAL | Age: 60
Discharge: HOME | End: 2020-06-08

## 2020-06-08 PROCEDURE — 77385: CPT | Performed by: RADIOLOGY

## 2020-06-09 ENCOUNTER — HOSPITAL ENCOUNTER (OUTPATIENT)
Dept: RADIATION ONCOLOGY | Facility: HOSPITAL | Age: 60
Discharge: HOME | End: 2020-06-09

## 2020-06-09 VITALS — WEIGHT: 315 LBS | BODY MASS INDEX: 50.24 KG/M2

## 2020-06-09 PROCEDURE — 77385: CPT | Performed by: RADIOLOGY

## 2020-06-10 ENCOUNTER — HOSPITAL ENCOUNTER (OUTPATIENT)
Dept: RADIATION ONCOLOGY | Facility: HOSPITAL | Age: 60
Discharge: HOME | End: 2020-06-10

## 2020-06-10 PROCEDURE — 77385: CPT | Performed by: RADIOLOGY

## 2020-06-11 ENCOUNTER — HOSPITAL ENCOUNTER (OUTPATIENT)
Dept: RADIATION ONCOLOGY | Facility: HOSPITAL | Age: 60
Discharge: HOME | End: 2020-06-11

## 2020-06-11 PROCEDURE — 77385: CPT | Performed by: RADIOLOGY

## 2020-06-12 ENCOUNTER — HOSPITAL ENCOUNTER (OUTPATIENT)
Dept: RADIATION ONCOLOGY | Facility: HOSPITAL | Age: 60
Discharge: HOME | End: 2020-06-12

## 2020-06-12 PROCEDURE — 77336 RADIATION PHYSICS CONSULT: CPT | Performed by: RADIOLOGY

## 2020-06-12 PROCEDURE — 77385: CPT | Performed by: RADIOLOGY

## 2020-06-15 ENCOUNTER — HOSPITAL ENCOUNTER (OUTPATIENT)
Dept: RADIATION ONCOLOGY | Facility: HOSPITAL | Age: 60
Discharge: HOME | End: 2020-06-15

## 2020-06-15 PROCEDURE — 77385: CPT | Performed by: RADIOLOGY

## 2020-06-16 ENCOUNTER — HOSPITAL ENCOUNTER (OUTPATIENT)
Dept: RADIATION ONCOLOGY | Facility: HOSPITAL | Age: 60
Discharge: HOME | End: 2020-06-16

## 2020-06-16 VITALS — BODY MASS INDEX: 45.19 KG/M2 | WEIGHT: 315 LBS

## 2020-06-16 PROCEDURE — 77385: CPT | Performed by: RADIOLOGY

## 2020-06-17 ENCOUNTER — HOSPITAL ENCOUNTER (OUTPATIENT)
Dept: RADIATION ONCOLOGY | Facility: HOSPITAL | Age: 60
Discharge: HOME | End: 2020-06-17

## 2020-06-17 PROCEDURE — 77385: CPT | Performed by: RADIOLOGY

## 2020-06-18 ENCOUNTER — HOSPITAL ENCOUNTER (OUTPATIENT)
Dept: RADIATION ONCOLOGY | Facility: HOSPITAL | Age: 60
Discharge: HOME | End: 2020-06-18

## 2020-06-18 PROCEDURE — 77385: CPT | Performed by: RADIOLOGY

## 2020-07-27 ENCOUNTER — OFFICE VISIT (OUTPATIENT)
Dept: RADIATION ONCOLOGY | Facility: HOSPITAL | Age: 60
End: 2020-07-27

## 2020-07-27 ENCOUNTER — HOSPITAL ENCOUNTER (OUTPATIENT)
Dept: RADIATION ONCOLOGY | Facility: HOSPITAL | Age: 60
Setting detail: RADIATION/ONCOLOGY SERIES
Discharge: HOME | End: 2020-07-27

## 2020-07-27 DIAGNOSIS — N52.9 ERECTILE DYSFUNCTION, UNSPECIFIED ERECTILE DYSFUNCTION TYPE: Primary | ICD-10-CM

## 2020-07-27 DIAGNOSIS — C61 PROSTATE CANCER (HCC): ICD-10-CM

## 2020-07-27 RX ORDER — TADALAFIL 20 MG/1
20 TABLET ORAL DAILY PRN
Qty: 30 TABLET | Refills: 11 | Status: SHIPPED | OUTPATIENT
Start: 2020-07-27 | End: 2021-01-22

## 2020-07-27 NOTE — PROGRESS NOTES
TELEMEDICINE FOLLOW-UP NOTE    PATIENT:                                                      Tyrell Guzmán  MEDICAL RECORD #:                        [id number]  :                                                          1960  COMPLETION DATE:   2020  DIAGNOSIS:     Prostate cancer (CMS/HCC)  - Clinical- Stage IIC (cT1c, cN0, cM0, PSA: 6.1, Grade Group: 3)  - Pathologic- Stage Unknown (pT3b, pNX, cM0, PSA: 6.1, Grade Group: 3)        Time Devoted to Visit: 17 min including time to review his previous imaging and records, with 50% devoted to direct discussion.    Reason for Visit:  I personally contacted Tyrell Guzmán today in an effort to screen for coronavirus symptoms and also to perform his scheduled follow-up visit remotely per patient preference in light of the coronavirus pandemic.  With respect to his specific risks for coronavirus, his screen is as follows:    COVID-19 RISK SCREEN     1. Has the patient had close contact without PPE with a lab confirmed COVID-19 (+) person or a person under investigation (PUI) for COVID-19 infection?  -- No     2. Has the patient had respiratory symptoms, worsened/new cough and/or SOA, unexplained fever, or sudden loss of smell and/or taste in the past 7 days? --  No    3. Does the patient have baseline higher exposure risk such as working in healthcare field or currently residing in healthcare facility?  --  No     He also denied any new respiratory symptoms or fever within the past 14 days.        BRIEF HISTORY:  Initial follow-up visit.  He has a history of high risk prostate cancer, status post prostatectomy 2020.  Postsurgical PSA was undetectable.  He underwent adjuvant radiotherapy for seminal vesicle invasion and positive surgical margin.  He tolerated treatment well.  Grade 1 fatigue has continued to improve.  He notes mild exacerbation of urinary irritative and outflow obstructive symptoms which are slowly returning to  baseline.  He continues with occasional dysuria, increased urinary frequency, and increased urgency with occasional urge incontinence.  Mild residual daytime stress incontinence has remained stable since prostatectomy.  He has taken prn tadalafil very infrequently with no effect on urinary symptoms or erectile dysfunction.  Bowels are normal.  Of note, he has a history of stage III colorectal cancer in extended remission after primary resection in 2010 followed by adjuvant chemotherapy.          IPSS Questionnaire (AUA-7):  Over the past month…    1)  Incomplete Emptying  How often have you had a sensation of not emptying your bladder?  1 - Less than 1 time in 5   2)  Frequency  How often have you had to urinate less than every two hours? 2 - Less than half the time   3)  Intermittency  How often have you found you stopped and started again several times when you urinated?  1 - Less than 1 time in 5   4) Urgency  How often have you found it difficult to postpone urination?  3 - About half the time   5) Weak Stream  How often have you had a weak urinary stream?  2 - Less than half the time   6) Straining  How often have you had to push or strain to begin urination?  0 - Not at all   7) Nocturia  How many times did you typically get up at night to urinate?  1 - 1 time   Total Score:  10       Quality of life due to urinary symptoms:  If you were to spend the rest of your life with your urinary condition the way it is now, how would you feel about that? 2-Mostly Satisfied   Urine Leakage (Incontinence) 1-Mild (A few drops a day, no pad use)     Sexual Health Inventory  Current Status    1)  How do you rate your confidence that you could achieve and keep an erection? 1-Very Low   2) When you had erections with sexual stimulation, how often were your erections hard enough for penetration (entering your partner)? 2-A few times (much less than half the time)   3)  During sexual intercourse, how often were you able to  maintain your erection after you had penetrated (entered) into your partner? 2-A few times (much less than half the time)   4) During sexual intercourse, how difficult was it to maintain your erection to completion of intercourse? 2-Very difficult   5) When you attempted sexual intercourse, how often was it satisfactory to you? 2-A few times (much less than half the time)   Total Score: 9       Bowel Health Inventory  Current Status: 0-No problems, no rectal bleeding, no discharge, less then 5 bowel movements a day       MEDICATIONS: Medication reconciliation for the patient was reviewed and confirmed in the electronic medical record.    Review of Systems   Constitutional: Positive for fatigue.   Genitourinary: Positive for bladder incontinence (SI stable, UI improving) and dysuria. Negative for difficulty urinating and hematuria.    All other systems reviewed and are negative.      KPS 90%    Physical Exam  Unable to perform as visit was conducted via telephone per patient request/preference in light of the COVID-19 pandemic.    The following portions of the patient's history were reviewed and updated as appropriate: allergies, current medications, past family history, past medical history, past social history, past surgical history and problem list.         Tyrell was seen today for prostate cancer.    Diagnoses and all orders for this visit:    Erectile dysfunction, unspecified erectile dysfunction type  -     tadalafil (CIALIS) 20 MG tablet; Take 1 tablet by mouth Daily As Needed for Erectile Dysfunction.    Prostate cancer (CMS/[facility])         IMPRESSION:  Prostate cancer, Dimitri score 4+3 = 7, clinical stage IIC (T1c, N0, M0), PSA 6.1 ng/ml.  Status post prostatectomy 1/11/2020 with final pathologic stage IIIB (T3b, Nx, M0).  He is now 5 weeks status post adjuvant radiotherapy.  He tolerated treatment well.  Residual radiation-related toxicities continue to jie.  Postsurgical PSA remains undetectable.  Refill  sent for tadalafil daily as needed.  Safety and use discussed.        The patient and I have reviewed the survivorship care plan.  We discussed diagnosis, follow-up intervals, PSA monitoring, and expectations for response to treatment.  A copy of the care plan was mailed to the patient.  A copy was also sent to his PCP.      RECOMMENDATIONS:  Mr. Braswell remains under the urologic surveillance of Dr. Dietz.    Return in about 6 months (around 1/27/2021) for Office Visit.    Angela Rothman, APRN

## 2020-09-23 ENCOUNTER — OFFICE VISIT (OUTPATIENT)
Dept: CARDIOLOGY | Facility: CLINIC | Age: 60
End: 2020-09-23

## 2020-09-23 VITALS
HEART RATE: 59 BPM | TEMPERATURE: 95.9 F | WEIGHT: 309 LBS | SYSTOLIC BLOOD PRESSURE: 102 MMHG | DIASTOLIC BLOOD PRESSURE: 50 MMHG | HEIGHT: 69 IN | OXYGEN SATURATION: 98 % | BODY MASS INDEX: 45.77 KG/M2

## 2020-09-23 DIAGNOSIS — I10 ESSENTIAL HYPERTENSION: Primary | ICD-10-CM

## 2020-09-23 DIAGNOSIS — R60.0 LOWER EXTREMITY EDEMA: ICD-10-CM

## 2020-09-23 PROCEDURE — 99213 OFFICE O/P EST LOW 20 MIN: CPT | Performed by: INTERNAL MEDICINE

## 2020-09-23 NOTE — PROGRESS NOTES
[facility] Cardiology    Patient ID: Tyrell Guzmán is a 60 y.o. male.  : 1960   Contact: [phone]    Encounter date: 2020    PCP: LAURO Avila MD      Chief complaint:   Chief Complaint   Patient presents with   • Hypertension       Problem List:  1. Bilateral lower extremity edema.  2. Abnormal EKG revealing poor anterior R-wave progression, inferior lateral T-wave inversion:  a. Reportedly normal stress test,  -- data deficit.  b. Echocardiogram, 2008: EF > 55%, trace MR/TR with normal RVSP.  c. Cardiolite GXT, 2008: EF 66% with no inducible ischemia.  d. Echocardiogram, 2014: Normal EF. Minimally reduced exercise tolerance.  e. Stress echo, 2017: EF 60%. Excellent exercise tolerance, expected 09:40 and actual duration of 10:16. Normal BP and HR response to exercise. Abnormal baseline EKG which became more abnormal with exercise as expected. No evidence of inducible ischemia.  3. Hypertension.  4. Diabetes mellitus.  5. Obesity.  6. Colon cancer:  a. Colectomy, 2010, Dr. Davis.  b. Chemotherapy x6 months.  7. Surgical history:  a. Vasectomy.  b. Colectomy.    Allergies   Allergen Reactions   • Lisinopril Cough       Current Medications:    Current Outpatient Medications:   •  B Complex Vitamins (VITAMIN B COMPLEX) capsule capsule, Take 1 capsule by mouth Daily., Disp: , Rfl:   •  hydroCHLOROthiazide (HYDRODIURIL) 25 MG tablet, Take 1 tablet by mouth Daily., Disp: 90 tablet, Rfl: 3  •  losartan (COZAAR) 100 MG tablet, TAKE 1 TABLET DAILY (Patient taking differently: Take 100 mg by mouth Every Morning.), Disp: 90 tablet, Rfl: 3  •  Multiple Vitamins-Minerals (CENTRUM SILVER 50+MEN) tablet, Take 1 tablet by mouth Daily., Disp: , Rfl:   •  SITagliptin (JANUVIA) 100 MG tablet, Take 100 mg by mouth Daily., Disp: , Rfl:   •  tadalafil (ADCIRCA) 20 MG tablet tablet, Take 0.5-1 tablet by mouth Daily for ED., Disp: 20 tablet, Rfl:  "5  •  tadalafil (CIALIS) 20 MG tablet, Take 1 tablet by mouth Daily As Needed for Erectile Dysfunction., Disp: 30 tablet, Rfl: 11  •  triamterene-hydrochlorothiazide (MAXZIDE) 75-50 MG per tablet, TAKE 1 TABLET DAILY, Disp: 90 tablet, Rfl: 2    HPI    Tyrell Guzmán is a 60 y.o. male who presents today for annual follow up of bilateral lower extremity edema and cardiac risk factors. Since last visit, he has been feeling well overall from a cardiovascular standpoint. He states his blood glucose levels at home have been consistently less than 140. He reports he is concerned about his varicose veins as they are causing lower extremity pain. He has been wearing compression socks. He walks approximately 2.5 miles at least 3-4 times weekly with no significant cardiopulmonary limitations. Patient denies chest pain, shortness of breath, palpitations, edema, dizziness, and syncope.       The following portions of the patient's history were reviewed and updated as appropriate: allergies, current medications and problem list.    Pertinent positives as listed in the HPI.  All other systems reviewed are negative.         Vitals:    09/23/20 0943   BP: 102/50   BP Location: Left arm   Patient Position: Sitting   Pulse: 59   Temp: 95.9 °F (35.5 °C)   SpO2: 98%   Weight: (!) 140 kg (309 lb)   Height: 175.3 cm (69\")       Physical Exam:  General: Alert and oriented.  Neck: Jugular venous pressure is within normal limits. Carotids have normal upstrokes without bruits.   Cardiovascular: Heart has a nondisplaced focal PMI. Regular rate and rhythm. No murmur, gallop or rub.  Lungs: Clear, no rales or wheezes. Equal expansion is noted.   Extremities: Show trace lower extremity edema.  Varicosities noted.  Skin: Warm and dry.  Neurologic: Nonfocal.     Diagnostic Data (reviewed with patient):  Lab date: 8/14/2020  • FLP: , , HDL 32,   • CMP: Glu 117, BUN 21, Creat 1.13, eGFR >60, Na 141, K 4.2, Cl 103, CO2 29, Ca " 9.4, Alk Phos 66, AST 18, ALT 20  • CBC: WBC 4.6, RBC 5.06, HGB 14.3, HCT 43.0, MCV 85, MCH 28.3,   • HbA1c: 7.2%     Procedures      Assessment:    ICD-10-CM ICD-9-CM   1. Essential hypertension  I10 401.9   2. Bilateral lower extremity edema.  R60.0 782.3     Henrique plans to see a vascular specialist that his wife knows.  If he needs a referral for his varicosities I have offered to do that for him.      Plan:  1. Recommended increasing exercise as tolerated.  2. Continue Maxzide and Hydrodiuril for fluid retention.  3. Continue losartan for hypertension.  4. Continue all other current medications.  5. F/up in 12 months, sooner if needed.    I, Efrem Valentin, attest that this documentation has been prepared under the direction and in the presence of Suzan Ellis MD 09/23/2020    I Suzan Ellis MD personally performed the services described in this documentation as scribed by the above individual in my presence, and it is both accurate and complete.    Suzan Ellis MD, FACC

## 2020-10-15 ENCOUNTER — OFFICE VISIT (OUTPATIENT)
Dept: NEUROLOGY | Facility: CLINIC | Age: 60
End: 2020-10-15

## 2020-10-15 VITALS
DIASTOLIC BLOOD PRESSURE: 60 MMHG | OXYGEN SATURATION: 98 % | SYSTOLIC BLOOD PRESSURE: 130 MMHG | TEMPERATURE: 98 F | BODY MASS INDEX: 45.77 KG/M2 | HEART RATE: 70 BPM | WEIGHT: 309 LBS | HEIGHT: 69 IN

## 2020-10-15 DIAGNOSIS — G47.33 OBSTRUCTIVE APNEA: Primary | ICD-10-CM

## 2020-10-15 PROCEDURE — 99212 OFFICE O/P EST SF 10 MIN: CPT | Performed by: NURSE PRACTITIONER

## 2020-10-15 NOTE — PROGRESS NOTES
"Subjective:   Chief Complaint   Patient presents with   • Obstructive Sleep Apnea     1yr follow up        Patient ID: Tyrell Guzmán is a 60 y.o. male.    Sleep Apnea       60 y.o. male with DIPIKA returns in follow up.  Last visit on 10/15/19 renewed mask and supplies.       No new or worsening sx.      Compliant with CPAP and receiving benefit.      Averaging 7 to 8 hours a night using CPAP 12 cm H20.  Restorative sleep with CPAP. AHI 0.8.     Using the nasal piece.     Has lost 5 lbs, is working on eating healthier    DME:  [employer]     The following portions of the patient's history were reviewed and updated as appropriate: allergies, current medications, past medical history, past surgical history and problem list.    Review of Systems   Constitutional: Negative for activity change and unexpected weight change.   HENT: Negative for facial swelling, hearing loss, tinnitus, trouble swallowing and voice change.    Eyes: Negative for photophobia, pain and visual disturbance.   Respiratory: Negative for apnea and choking.    Gastrointestinal: Negative for constipation.   Endocrine: Negative for cold intolerance.   Genitourinary: Negative for difficulty urinating, frequency and urgency.   Musculoskeletal: Negative for back pain, gait problem and neck stiffness.   Allergic/Immunologic: Negative for immunocompromised state.   Neurological: Negative for dizziness, tremors, seizures, syncope, facial asymmetry, speech difficulty and light-headedness.   Hematological: Negative for adenopathy.   Psychiatric/Behavioral: Positive for sleep disturbance. Negative for confusion, decreased concentration and hallucinations. The patient is not nervous/anxious.         Objective:  Vitals:    10/15/20 0806   BP: 130/60   Pulse: 70   Temp: 98 °F (36.7 °C)   SpO2: 98%   Weight: (!) 140 kg (309 lb)   Height: 175.3 cm (69\")       Neurologic Exam     Mental Status   Oriented to person, place, and time.   Speech: speech is " normal   Level of consciousness: alert  Knowledge: good and consistent with education.   Normal comprehension.     Cranial Nerves   Cranial nerves II through XII intact.     CN II   Visual fields full to confrontation.   Visual acuity: normal  Right visual field deficit: none  Left visual field deficit: none     CN III, IV, VI   Pupils are equal, round, and reactive to light.  Extraocular motions are normal.   Nystagmus: none   Diplopia: none  Ophthalmoparesis: none  Upgaze: normal  Downgaze: normal  Conjugate gaze: present    CN V   Facial sensation intact.   Right corneal reflex: normal  Left corneal reflex: normal    CN VII   Right facial weakness: none  Left facial weakness: none    CN VIII   Hearing: intact    CN IX, X   Palate: symmetric  Right gag reflex: normal  Left gag reflex: normal    CN XI   Right sternocleidomastoid strength: normal  Left sternocleidomastoid strength: normal    CN XII   Tongue: not atrophic  Fasciculations: absent  Tongue deviation: none    Motor Exam   Muscle bulk: normal  Overall muscle tone: normal    Strength   Strength 5/5 throughout.     Sensory Exam   Light touch normal.     Gait, Coordination, and Reflexes     Gait  Gait: normal    Tremor   Resting tremor: absent  Intention tremor: absent  Action tremor: absent    Reflexes   Reflexes 2+ except as noted.       Physical Exam   Constitutional: He is oriented to person, place, and time.   Eyes: Pupils are equal, round, and reactive to light. EOM are normal.   Neurological: He is oriented to person, place, and time. He has normal strength. Gait normal.   Psychiatric: His speech is normal.       Assessment/Plan:       Problems Addressed this Visit        Respiratory    Obstructive apnea - Primary     Renewed CPAP mask and supplies     F/U in 1 year or sooner if needed            Diagnoses       Codes Comments    Obstructive apnea    -  Primary ICD-10-CM: G47.33  ICD-9-CM: 327.23

## 2020-11-09 RX ORDER — TRIAMTERENE AND HYDROCHLOROTHIAZIDE 75; 50 MG/1; MG/1
TABLET ORAL
Qty: 90 TABLET | Refills: 3 | Status: SHIPPED | OUTPATIENT
Start: 2020-11-09 | End: 2021-12-02

## 2020-11-17 DIAGNOSIS — E78.00 HYPERCHOLESTEREMIA: Primary | ICD-10-CM

## 2020-11-18 RX ORDER — ROSUVASTATIN CALCIUM 5 MG/1
5 TABLET, COATED ORAL DAILY
Qty: 90 TABLET | Refills: 1 | Status: SHIPPED | OUTPATIENT
Start: 2020-11-18 | End: 2021-07-07 | Stop reason: SDUPTHER

## 2020-11-20 ENCOUNTER — OFFICE VISIT (OUTPATIENT)
Dept: ORTHOPEDIC SURGERY | Facility: CLINIC | Age: 60
End: 2020-11-20

## 2020-11-20 VITALS — HEART RATE: 73 BPM | HEIGHT: 69 IN | WEIGHT: 304 LBS | OXYGEN SATURATION: 98 % | BODY MASS INDEX: 45.03 KG/M2

## 2020-11-20 DIAGNOSIS — I87.8 VENOUS STASIS: ICD-10-CM

## 2020-11-20 DIAGNOSIS — M19.071 OSTEOARTHRITIS OF RIGHT ANKLE OR FOOT: ICD-10-CM

## 2020-11-20 DIAGNOSIS — M79.672 LEFT FOOT PAIN: Primary | ICD-10-CM

## 2020-11-20 DIAGNOSIS — M25.551 RIGHT HIP PAIN: ICD-10-CM

## 2020-11-20 PROCEDURE — 99203 OFFICE O/P NEW LOW 30 MIN: CPT | Performed by: ORTHOPAEDIC SURGERY

## 2020-11-20 NOTE — PROGRESS NOTES
"NEW PATIENT    Patient: Tyrell Guzmán  : 1960    Primary Care Provider: LAURO Avila MD    Requesting Provider: As above    Pain of the Right Foot      History    Chief Complaint: Right foot pain, right hip pain    History of Present Illness: This is an extremely pleasant 60-year-old gentleman who is the  of one of my patients.  He has a several year history of intermittent right hip pain.  It has been a little worse over the past month.  He is here because he also has some right midfoot and bunion pain.  He is wondering if it is aggravating his hip.  He works on his feet and does wear steel toed shoes.  He has had his shoe size measured.  He is wondering if orthotics would help.  He does have significant varicosities and venous stasis, he does wear compression socks.  He reports he is working on weight loss, he understands that will help both the foot and the hip.  He reports that Dr. Avila, his primary doctor has advised him he has \"Vladimir Nuno hip\"    Current Outpatient Medications on File Prior to Visit   Medication Sig Dispense Refill   • B Complex Vitamins (VITAMIN B COMPLEX) capsule capsule Take 1 capsule by mouth Daily.     • hydroCHLOROthiazide (HYDRODIURIL) 25 MG tablet Take 1 tablet by mouth Daily. 90 tablet 3   • losartan (COZAAR) 100 MG tablet TAKE 1 TABLET DAILY (Patient taking differently: Take 100 mg by mouth Every Morning.) 90 tablet 3   • Multiple Vitamins-Minerals (CENTRUM SILVER 50+MEN) tablet Take 1 tablet by mouth Daily.     • rosuvastatin (CRESTOR) 5 MG tablet Take 1 tablet by mouth Daily. 90 tablet 1   • SITagliptin (JANUVIA) 100 MG tablet Take 100 mg by mouth Daily.     • tadalafil (ADCIRCA) 20 MG tablet tablet Take 0.5-1 tablet by mouth Daily for ED. 20 tablet 5   • tadalafil (CIALIS) 20 MG tablet Take 1 tablet by mouth Daily As Needed for Erectile Dysfunction. 30 tablet 11   • triamterene-hydrochlorothiazide (MAXZIDE) 75-50 MG per tablet TAKE 1 TABLET DAILY 90 " tablet 3     No current facility-administered medications on file prior to visit.       Allergies   Allergen Reactions   • Lisinopril Cough      Past Medical History:   Diagnosis Date   • Abnormal EKG    • Bilateral lower extremity edema.    • Colon cancer (CMS/HCC)    • Colon cancer (CMS/HCC)    • CPAP (continuous positive airway pressure) dependence    • Diabetes mellitus (CMS/HCC)    • Enlarged prostate    • History of chemotherapy     x6 months.   • History of radiation therapy 06/18/2020    prostate bed s/p prostatectomy   • Hypertension    • Obesity    • Prostate cancer (CMS/HCC)    • Sleep apnea    • Wears glasses      Past Surgical History:   Procedure Laterality Date   • COLECTOMY PARTIAL / TOTAL     • COLONOSCOPY  2017   • PROSTATE SURGERY  01/10/2020   • PROSTATECTOMY N/A 1/10/2020    Procedure: PROSTATECTOMY LAPAROSCOPIC WITH DAVINCI ROBOT;  Surgeon: Andrés Dietz MD;  Location: [facility] include [facility] beds at the [facility];  Service: DaVinci   • TUMOR REMOVAL Right 2016    Shoulder- Benign   • VASECTOMY       Family History   Problem Relation Age of Onset   • Hyperlipidemia Other    • Heart disease Mother    • Diabetes Father    • Stroke Father    • Cancer Father    • Heart disease Father    • Hypertension Father       Social History     Socioeconomic History   • Marital status:      Spouse name: Not on file   • Number of children: Not on file   • Years of education: Not on file   • Highest education level: Not on file   Tobacco Use   • Smoking status: Never Smoker   • Smokeless tobacco: Never Used   Substance and Sexual Activity   • Alcohol use: Yes     Frequency: 2-4 times a month     Drinks per session: 1 or 2     Binge frequency: Never   • Drug use: No   • Sexual activity: Defer        Review of Systems   Constitutional: Negative.    HENT: Negative.    Eyes: Negative.    Respiratory: Positive for apnea.    Cardiovascular: Negative.    Gastrointestinal: Negative.    Endocrine: Negative.    Genitourinary: Negative.   "  Musculoskeletal: Positive for arthralgias.   Skin: Negative.    Allergic/Immunologic: Negative.    Neurological: Negative.    Hematological: Negative.    Psychiatric/Behavioral: Negative.        The following portions of the patient's history were reviewed and updated as appropriate: allergies, current medications, past family history, past medical history, past social history, past surgical history and problem list.    Physical Exam:   Pulse 73   Ht 175.3 cm (69.02\")   Wt (!) 138 kg (304 lb)   SpO2 98%   BMI 44.87 kg/m²   GENERAL: Body habitus: obese    Lower extremity edema: Right: 1+ pitting; Left: 1+ pitting    Varicose veins:  Right: moderate; Left: moderate    Gait: antalgic with the first few steps     Mental Status:  awake and alert; oriented to person, place, and time    Voice:  clear   SKIN:  Lower extremity: Normal    Hair Growth(lower extremity):  Right:normal; Left:  normal  NAILS: Toenails: normal  HEENT: Head: Normocephalic, atraumatic,  without obvious abnormality.  eye: normal external eye, no icterus  ears:normal external ears  PULM:  Repiratory effort normal  CV:  Dorsalis Pedis:  Right: 2+; Left:2+    Posterior Tibial: Right:2+; Left:2+    Capillary Refill:  Brisk  MSK:  Hand:mild arthritis, Heberden's nodes      Tibia:  Right:  non tender; Left:  non tender      Ankle:  Right: non tender, ROM  normal and symmetric and motor function  normal; Left:  non tender, ROM  normal and motor function  normal      Foot:  Right:  Moderate metatarsus adductus and hallux valgus metatarsus primus varus, slightly tender over the bunion today, nontender in the midfoot and hindfoot; Left:  Similar metatarsus adductus but slightly milder  hallux valgus, nontender in the midfoot and hindfoot      NEURO: Heel Walking:  Right:  normal; Left:  normal    Toe Walking:  Right:  normal; Left:  normal     Louann-Georgette 5.07 monofilament test: normal    Lower extremity sensation: intact     Reflexes:  Biceps:  " Right:  not tested; Left:  not tested           Quads:  Right:  not tested; Left:  not tested           Ankle:  Right:  not tested; Left:  not tested      Calf Atrophy:none    Motor Function: all 5/5         Medical Decision Making    Data Review:   ordered and reviewed x-rays today    Assessment and Plan/ Diagnosis/Treatment options:   1.  Right foot arthritis  He has midfoot arthritis, he also has metatarsus adductus and hallux valgus, I would recommend custom orthotics.  We also talked about having shoe size measured frequently.  I would also recommend topical Voltaren gel.  I will be happy to see him anytime  - XR Foot 2 View Right    2. Venous stasis  He definitely needs to wear compression stockings    3. Right hip pain  It sounds like he might have avascular necrosis.  We will have him see one of the partners            Radiology Ordered []  Radiology Reports Reviewed []      Radiology Images Reviewed []   Labs Reviewed []    Labs Ordered []   PCP Records Reviewed []    Provider Records Reviewed []    ER Records Reviewed []    Hospital Records Reviewed []    History Obtained From Family []    Phone conversation with Provider []    Records Requested []        Rere Rosas MD

## 2020-12-07 ENCOUNTER — OFFICE VISIT (OUTPATIENT)
Dept: ORTHOPEDIC SURGERY | Facility: CLINIC | Age: 60
End: 2020-12-07

## 2020-12-07 VITALS — HEART RATE: 75 BPM | OXYGEN SATURATION: 99 % | BODY MASS INDEX: 45.06 KG/M2 | HEIGHT: 69 IN | WEIGHT: 304.24 LBS

## 2020-12-07 DIAGNOSIS — M16.0 PRIMARY OSTEOARTHRITIS OF BOTH HIPS: Primary | ICD-10-CM

## 2020-12-07 PROCEDURE — 99214 OFFICE O/P EST MOD 30 MIN: CPT | Performed by: ORTHOPAEDIC SURGERY

## 2020-12-07 NOTE — PROGRESS NOTES
[facility] [place] [facility] Note    Subjective     Chief Complaint   Patient presents with   • Right Hip - Pain   • Left Hip - Pain        HPI    Tyrell Guzmán is a 60 y.o. male who presents with bilateral hip pain.  Onset: atraumatic and gradual in nature. The issue has been ongoing for 5 year(s). Pain is a 5/10 on the pain scale. Pain is described as aching. Associated symptoms include pain. The pain is worse with walking; resting improve the pain. Previous treatments have included: NSAIDS and weight loss.  Symptoms are currently controlled with ibuprofen which he uses intermittently.  No groin pain.  Most of his pain is on the lateral aspect of the hips.    I have reviewed the following portions of the patient's history and agree with: History of Present Illness and Review of Systems    Patient Active Problem List   Diagnosis   • Acute bronchitis   • Obstructive apnea   • Essential hypertension   • Bilateral lower extremity edema.   • Abnormal EKG   • Obesity   • History of colon cancer   • Prostate cancer (CMS/HCC)   • Diabetes (CMS/HCC)   • Status post prostatectomy, lap robotic     Past Medical History:   Diagnosis Date   • Abnormal EKG    • Bilateral lower extremity edema.    • Colon cancer (CMS/HCC)    • Colon cancer (CMS/HCC)    • CPAP (continuous positive airway pressure) dependence    • Diabetes mellitus (CMS/HCC)    • Enlarged prostate    • History of chemotherapy     x6 months.   • History of radiation therapy 06/18/2020    prostate bed s/p prostatectomy   • Hypertension    • Obesity    • Prostate cancer (CMS/HCC)    • Sleep apnea    • Wears glasses       Past Surgical History:   Procedure Laterality Date   • COLECTOMY PARTIAL / TOTAL     • COLONOSCOPY  2017   • PROSTATE SURGERY  01/10/2020   • PROSTATECTOMY N/A 1/10/2020    Procedure: PROSTATECTOMY LAPAROSCOPIC WITH DAVINCI ROBOT;  Surgeon: Andrés Dietz MD;  Location: [facility];  Service: DaVinci   • TUMOR REMOVAL Right 2016     Shoulder- Benign   • VASECTOMY        Family History   Problem Relation Age of Onset   • Hyperlipidemia Other    • Heart disease Mother    • Diabetes Father    • Stroke Father    • Cancer Father    • Heart disease Father    • Hypertension Father      Social History     Socioeconomic History   • Marital status:      Spouse name: Not on file   • Number of children: Not on file   • Years of education: Not on file   • Highest education level: Not on file   Tobacco Use   • Smoking status: Never Smoker   • Smokeless tobacco: Never Used   Substance and Sexual Activity   • Alcohol use: Yes     Frequency: 2-4 times a month     Drinks per session: 1 or 2     Binge frequency: Never   • Drug use: No   • Sexual activity: Defer      Current Outpatient Medications on File Prior to Visit   Medication Sig Dispense Refill   • B Complex Vitamins (VITAMIN B COMPLEX) capsule capsule Take 1 capsule by mouth Daily.     • hydroCHLOROthiazide (HYDRODIURIL) 25 MG tablet Take 1 tablet by mouth Daily. 90 tablet 3   • losartan (COZAAR) 100 MG tablet TAKE 1 TABLET DAILY (Patient taking differently: Take 100 mg by mouth Every Morning.) 90 tablet 3   • Multiple Vitamins-Minerals (CENTRUM SILVER 50+MEN) tablet Take 1 tablet by mouth Daily.     • rosuvastatin (CRESTOR) 5 MG tablet Take 1 tablet by mouth Daily. 90 tablet 1   • SITagliptin (JANUVIA) 100 MG tablet Take 100 mg by mouth Daily.     • tadalafil (ADCIRCA) 20 MG tablet tablet Take 0.5-1 tablet by mouth Daily for ED. 20 tablet 5   • tadalafil (CIALIS) 20 MG tablet Take 1 tablet by mouth Daily As Needed for Erectile Dysfunction. 30 tablet 11   • triamterene-hydrochlorothiazide (MAXZIDE) 75-50 MG per tablet TAKE 1 TABLET DAILY 90 tablet 3     No current facility-administered medications on file prior to visit.       Allergies   Allergen Reactions   • Lisinopril Cough        Review of Systems   Constitutional: Negative for activity change, appetite change, chills, diaphoresis, fatigue,  "fever and unexpected weight change.   HENT: Negative for congestion, dental problem, drooling, ear discharge, ear pain, facial swelling, hearing loss, mouth sores, nosebleeds, postnasal drip, rhinorrhea, sinus pressure, sneezing, sore throat, tinnitus, trouble swallowing and voice change.    Eyes: Negative for photophobia, pain, discharge, redness, itching and visual disturbance.   Respiratory: Negative for apnea, cough, choking, chest tightness, shortness of breath, wheezing and stridor.    Cardiovascular: Negative for chest pain, palpitations and leg swelling.   Gastrointestinal: Negative for abdominal distention, abdominal pain, anal bleeding, blood in stool, constipation, diarrhea, nausea, rectal pain and vomiting.   Endocrine: Negative for cold intolerance, heat intolerance, polydipsia, polyphagia and polyuria.   Genitourinary: Negative for decreased urine volume, difficulty urinating, dysuria, enuresis, flank pain, frequency, genital sores, hematuria and urgency.   Musculoskeletal: Positive for arthralgias. Negative for back pain, gait problem, joint swelling, myalgias, neck pain and neck stiffness.   Skin: Negative for color change, pallor, rash and wound.   Allergic/Immunologic: Negative for environmental allergies, food allergies and immunocompromised state.   Neurological: Negative for dizziness, tremors, seizures, syncope, facial asymmetry, speech difficulty, weakness, light-headedness, numbness and headaches.   Hematological: Negative for adenopathy. Does not bruise/bleed easily.   Psychiatric/Behavioral: Negative for agitation, behavioral problems, confusion, decreased concentration, dysphoric mood, hallucinations, self-injury, sleep disturbance and suicidal ideas. The patient is not nervous/anxious and is not hyperactive.         Objective      Physical Exam  Pulse 75   Ht 175.3 cm (69.02\")   Wt (!) 138 kg (304 lb 3.8 oz)   SpO2 99%   BMI 44.91 kg/m²     Body mass index is 44.91 " kg/m².    General:   Mental Status:  Alert   Appearance: Cooperative, in no acute distress   Build and Nutrition: Obese male   Orientation: Alert and oriented to person, place and time   Posture: Normal   Gait: Normal    Integument:   Right hip: No skin lesions, no rash, no ecchymosis   Left hip: No skin lesions, no rash, no ecchymosis    Neurologic:   Sensation:    Right foot: Intact to light touch on the dorsal and plantar aspect    Left foot: Intact to light touch on the dorsal and plantar aspect   Motor:  Right lower extremity: 5/5 quadriceps, hamstrings, ankle dorsiflexors, and ankle plantar flexors  Left lower extremity: 5/5 quadriceps, hamstrings, ankle dorsiflexors, and ankle plantar flexors  Vascular:   Right lower extremity: 2+ dorsalis pedis pulse, prompt capillary refill   Left lower extremity: 2+ dorsalis pedis pulse, prompt capillary refill    Lower Extremities:   Right Hip:    Tenderness:  None    Swelling:  None    Crepitus: None    Atrophy:  None    Range of motion:  External Rotation: 30°       Internal Rotation: 10°       Flexion:  100°       Extension:  0°   Instability:  None  Deformities:  None  Functional testing: Negative Stinchfield  No leg length discrepancy   Left Hip:    Tenderness:  None    Swelling:  None    Crepitus:  None    Atrophy:  None    Range of motion:  External Rotation: 30°       Internal Rotation: 10°       Flexion:  100°       Extension:  0°   Instability:  None  Deformities:  None  Functional testing: Negative Stinchfield    No leg length discrepancy      Imaging/Studies      Imaging Results (Last 24 Hours)     Procedure Component Value Units Date/Time    XR Hips Bilateral With or Without Pelvis 3-4 View [[id number]] Resulted: 12/07/20 1017     Updated: 12/07/20 1018    Narrative:      Right Hip Radiographs  Indication: right hip pain  Views: low AP pelvis and lateral of the right hip    Comparison: no prior studies available for review    Findings:   Arthritic changes,  with joint space narrowing, osteophytes at the head   neck junction, acetabular osteophytes, varus alignment, no acute bony   abnormalities.  No unusual bony features.    Left Hip Radiographs  Indication: left hip pain  Views: low AP pelvis and lateral of the left hip    Comparison: no prior studies available for review    Findings:   Arthritic changes, with joint space narrowing, osteophytes at the head   neck junction, acetabular osteophytes, varus alignment, no acute bony   abnormalities.  No unusual bony features.          Assessment and Plan     Diagnoses and all orders for this visit:    1. Primary osteoarthritis of both hips (Primary)  -     XR Hips Bilateral With or Without Pelvis 3-4 View        1. Primary osteoarthritis of both hips        I reviewed my findings with patient today.  He has advanced bilateral hip arthritis.  Symptoms are mild to moderate at the current time, and controlled with ibuprofen.  Which he uses intermittently.  He may continue with intermittent ibuprofen, and I will see him back in 6 months with a repeat x-ray.  I will see him back sooner for any problems.  Ultimately he may be a candidate for hip replacement surgery in the future when his symptoms warrant.    Return in about 6 months (around 6/7/2021) for Recheck with X-Rays.    Medical Decision Making  Management Options : over-the-counter medicine  Data/Risk: radiology tests and independent visualization of imaging, lab tests, or EMG/NCV      Shaq High MD  12/07/20  10:31 HERLINDA Lowe disclaimer:  Much of this encounter note is an electronic transcription/translation of spoken language to printed text. The electronic translation of spoken language may permit erroneous, or at times, nonsensical words or phrases to be inadvertently transcribed; Although I have reviewed the note for such errors, some may still exist.

## 2021-01-22 ENCOUNTER — HOSPITAL ENCOUNTER (OUTPATIENT)
Dept: RADIATION ONCOLOGY | Facility: HOSPITAL | Age: 61
Setting detail: RADIATION/ONCOLOGY SERIES
Discharge: HOME | End: 2021-01-22

## 2021-01-22 ENCOUNTER — OFFICE VISIT (OUTPATIENT)
Dept: RADIATION ONCOLOGY | Facility: HOSPITAL | Age: 61
End: 2021-01-22

## 2021-01-22 VITALS
WEIGHT: 315 LBS | OXYGEN SATURATION: 97 % | BODY MASS INDEX: 46.65 KG/M2 | TEMPERATURE: 97.6 F | DIASTOLIC BLOOD PRESSURE: 60 MMHG | RESPIRATION RATE: 18 BRPM | HEIGHT: 69 IN | SYSTOLIC BLOOD PRESSURE: 130 MMHG | HEART RATE: 59 BPM

## 2021-01-22 DIAGNOSIS — C61 PROSTATE CANCER (HCC): ICD-10-CM

## 2021-01-22 PROCEDURE — G0463 HOSPITAL OUTPT CLINIC VISIT: HCPCS

## 2021-01-22 NOTE — PROGRESS NOTES
FOLLOW UP NOTE    PATIENT:                                                      Tyrell Guzmán  MEDICAL RECORD #:                        [id number]  :                                                          1960  COMPLETION DATE:   2020  DIAGNOSIS:     Prostate cancer (CMS/[facility])  - Clinical- Stage IIC (cT1c, cN0, cM0, PSA: 6.1, Grade Group: 3)  - Pathologic- Stage Unknown (pT3b, pNX, cM0, PSA: 6.1, Grade Group: 3)      BRIEF HISTORY:    Routine follow-up visit for high risk prostate cancer, status post prostatectomy 2020.  Postsurgical PSA was undetectable.  He underwent adjuvant radiotherapy for adverse risk features including seminal vesicle invasion and positive surgical margin.  He tolerated treatment well.  PSA continues to remain undetectable.  He is voiding well with no acute urinary complaints today.  He no longer is bothered by mixed incontinence and endorses only minimal, occasional leaking.  No dysuria or hematuria.  Bowels are regular.  He reports erectile function as poor.  Oral ED medications rendered ineffective and he has started Trimix injections with little effect.  No new aches or pains.  Overall, he feels well.   Of note, he has a history of stage III colorectal cancer in extended remission after primary resection in  followed by adjuvant chemotherapy.      IPSS Questionnaire (AUA-7):  Over the past month…    1)  Incomplete Emptying  How often have you had a sensation of not emptying your bladder?  0 - Not at all   2)  Frequency  How often have you had to urinate less than every two hours? 1 - Less than 1 time in 5   3)  Intermittency  How often have you found you stopped and started again several times when you urinated?  0 - Not at all   4) Urgency  How often have you found it difficult to postpone urination?  1 - Less than 1 time in 5   5) Weak Stream  How often have you had a weak urinary stream?  0 - Not at all   6) Straining  How often have you had to push or  strain to begin urination?  0 - Not at all   7) Nocturia  How many times did you typically get up at night to urinate?  1 - 1 time   Total Score:  3       Quality of life due to urinary symptoms:  If you were to spend the rest of your life with your urinary condition the way it is now, how would you feel about that? 2-Mostly Satisfied   Urine Leakage (Incontinence) 1-Mild (A few drops a day, no pad use)     Sexual Health Inventory  Current Status    1)  How do you rate your confidence that you could achieve and keep an erection? 1-Very Low   2) When you had erections with sexual stimulation, how often were your erections hard enough for penetration (entering your partner)? 1-Almost never or never   3)  During sexual intercourse, how often were you able to maintain your erection after you had penetrated (entered) into your partner? 1-Almost never or never   4) During sexual intercourse, how difficult was it to maintain your erection to completion of intercourse? 1-Extremely difficult   5) When you attempted sexual intercourse, how often was it satisfactory to you? 1-Almost never or never   Total Score: 5       Bowel Health Inventory  Current Status: 0-No problems, no rectal bleeding, no discharge, less then 5 bowel movements a day         MEDICATIONS: Medication reconciliation for the patient was reviewed and confirmed in the electronic medical record.    Review of Systems   Musculoskeletal: Positive for back pain (LBP stable).   All other systems reviewed and are negative.      KPS 90%    Physical Exam  Vitals signs and nursing note reviewed.   Constitutional:       General: He is not in acute distress.     Appearance: He is well-developed. He is obese.   HENT:      Head: Normocephalic and atraumatic.   Eyes:      Conjunctiva/sclera: Conjunctivae normal.      Pupils: Pupils are equal, round, and reactive to light.   Neck:      Musculoskeletal: Normal range of motion and neck supple.   Cardiovascular:      Rate and  "Rhythm: Normal rate and regular rhythm.      Heart sounds: No murmur. No friction rub.   Pulmonary:      Effort: Pulmonary effort is normal.      Breath sounds: Normal breath sounds. No wheezing.   Abdominal:      General: Bowel sounds are normal. There is no distension.      Palpations: Abdomen is soft. There is no mass.      Tenderness: There is no abdominal tenderness.   Musculoskeletal: Normal range of motion.   Lymphadenopathy:      Cervical: No cervical adenopathy.      Upper Body:      Right upper body: No supraclavicular adenopathy.      Left upper body: No supraclavicular adenopathy.   Skin:     General: Skin is warm and dry.   Neurological:      Mental Status: He is alert and oriented to person, place, and time.   Psychiatric:         Behavior: Behavior normal.         Thought Content: Thought content normal.         Judgment: Judgment normal.         VITAL SIGNS:   Vitals:    01/22/21 1023   BP: 130/60   Pulse: 59   Resp: 18   Temp: 97.6 °F (36.4 °C)   TempSrc: Temporal   SpO2: 97%  Comment: RA   Weight: (!) 143 kg (315 lb)   Height: 175.3 cm (69\")   PainSc: 0-No pain       The following portions of the patient's history were reviewed and updated as appropriate: allergies, current medications, past family history, past medical history, past social history, past surgical history and problem list.    LABORATORY:  PSA 8/24/2020 = <0.04 ng/ml  PSA 11/24/2020 = <0.04 ng/ml       Diagnoses and all orders for this visit:    1. Prostate cancer (CMS/[facility])         IMPRESSION:  Prostate cancer, Aurora score 4+3 = 7, clinical stage IIC (T1c, N0, M0), pre-surgical PSA 6.1 ng/ml.  Status post prostatectomy 1/11/2020 with final pathologic stage IIIB (T3b, Nx, M0).  He is now 7 months status post adjuvant radiotherapy.  PSA remains undetectable.  No evidence of biochemical recurrence.  Prognosis should hopefully remain favorable.  Continue ED management per urology.  We again reviewed survivorship model with follow-up " intervals, PSA monitoring, and continued expectations for response to treatment.      RECOMMENDATIONS:  Mr. Braswell continues urologic surveillance under the care of Dr. Dietz, with follow-up and repeat PSA scheduled 2/23/2021.  Return for 1 additional follow-up in 1 year.    Return in about 1 year (around 1/22/2022) for Office Visit.    Angela Rothman, APRN

## 2021-02-10 PROCEDURE — U0004 COV-19 TEST NON-CDC HGH THRU: HCPCS | Performed by: FAMILY MEDICINE

## 2021-02-11 ENCOUNTER — TELEPHONE (OUTPATIENT)
Dept: URGENT CARE | Facility: CLINIC | Age: 61
End: 2021-02-11

## 2021-02-26 RX ORDER — TADALAFIL 20 MG/1
20 TABLET ORAL DAILY PRN
Qty: 30 TABLET | Refills: 6 | Status: SHIPPED | OUTPATIENT
Start: 2021-02-26 | End: 2021-02-26 | Stop reason: SDUPTHER

## 2021-02-26 RX ORDER — TADALAFIL 20 MG/1
20 TABLET ORAL DAILY PRN
Qty: 30 TABLET | Refills: 6 | Status: SHIPPED | OUTPATIENT
Start: 2021-02-26 | End: 2022-01-10

## 2021-05-27 RX ORDER — HYDROCHLOROTHIAZIDE 25 MG/1
TABLET ORAL
Qty: 90 TABLET | Refills: 1 | Status: SHIPPED | OUTPATIENT
Start: 2021-05-27 | End: 2021-11-01

## 2021-07-08 RX ORDER — ROSUVASTATIN CALCIUM 5 MG/1
5 TABLET, COATED ORAL DAILY
Qty: 90 TABLET | Refills: 1 | Status: SHIPPED | OUTPATIENT
Start: 2021-07-08 | End: 2021-12-17

## 2021-09-29 NOTE — PROGRESS NOTES
[facility] Cardiology    Patient ID: Tyrell Guzmán is a 61 y.o. male.  : 1960   Contact: [phone]    Encounter date: 2021    PCP: Shan Villalobos MD      Chief complaint:   Chief Complaint   Patient presents with   • Essential hypertension       Problem List:  1. Bilateral lower extremity edema.  2. Abnormal EKG revealing poor anterior R-wave progression, inferior lateral T-wave inversion:  a. Reportedly normal stress test,  -- data deficit.  b. Echocardiogram, 2008: EF > 55%, trace MR/TR with normal RVSP.  c. Cardiolite GXT, 2008: EF 66% with no inducible ischemia.  d. Echocardiogram, 2014: Normal EF. Minimally reduced exercise tolerance.  e. Stress echo, 2017: EF 60%. Excellent exercise tolerance, expected 09:40 and actual duration of 10:16. Normal BP and HR response to exercise. Abnormal baseline EKG which became more abnormal with exercise as expected. No evidence of inducible ischemia.  3. Hypertension.  4. Diabetes mellitus.  5. Obesity.  6. Colon cancer:  a. Colectomy, 2010, Dr. Davis.  b. Chemotherapy x6 months.  7. Surgical history:  a. Vasectomy.  b. Colectomy.    Allergies   Allergen Reactions   • Lisinopril Cough       Current Medications:    Current Outpatient Medications:   •  B Complex Vitamins (VITAMIN B COMPLEX) capsule capsule, Take 1 capsule by mouth Daily., Disp: , Rfl:   •  Continuous Blood Gluc Sensor (FreeStyle Hebert 14 Day Sensor) misc, Apply 1 Device topically to the appropriate area as directed Continuous., Disp: , Rfl:   •  hydroCHLOROthiazide (HYDRODIURIL) 25 MG tablet, TAKE 1 TABLET DAILY, Disp: 90 tablet, Rfl: 1  •  losartan (COZAAR) 100 MG tablet, TAKE 1 TABLET DAILY (Patient taking differently: Take 100 mg by mouth Every Morning.), Disp: 90 tablet, Rfl: 3  •  Multiple Vitamins-Minerals (CENTRUM SILVER 50+MEN) tablet, Take 1 tablet by mouth Daily., Disp: , Rfl:   •  rosuvastatin (CRESTOR) 5  "MG tablet, Take 1 tablet by mouth Daily., Disp: 90 tablet, Rfl: 1  •  Semaglutide,0.25 or 0.5MG/DOS, (Ozempic, 0.25 or 0.5 MG/DOSE,) 2 MG/1.5ML solution pen-injector, Inject 0.25 mg under the skin into the appropriate area as directed 1 (One) Time Per Week., Disp: , Rfl:   •  tadalafil (CIALIS) 20 MG tablet, Take 1 tablet by mouth Daily As Needed for Erectile Dysfunction., Disp: 30 tablet, Rfl: 6  •  triamterene-hydrochlorothiazide (MAXZIDE) 75-50 MG per tablet, TAKE 1 TABLET DAILY, Disp: 90 tablet, Rfl: 3  •  SITagliptin (JANUVIA) 100 MG tablet, Take 100 mg by mouth Daily., Disp: , Rfl:     HPI    Tyrell Guzmán is a 61 y.o. male who presents today for an annual follow up of hypertension and bilateral lower extremity edema. Since last visit, patient's blood pressure has been around 130-136/70. He exercises 2-3 days a week lifting weights and walking. Reports occasional pain on top of left foot primarily after work while resting. The type of shoes he wears does make a difference and has improved with shoe inserts. Patient denies chest pain, shortness of breath, palpitations, edema, dizziness, and syncope.       The following portions of the patient's history were reviewed and updated as appropriate: allergies, current medications and problem list.    Pertinent positives as listed in the HPI.  All other systems reviewed are negative.         Vitals:    09/30/21 1025   BP: 128/66   BP Location: Left arm   Patient Position: Sitting   Cuff Size: Adult   Pulse: 64   SpO2: 96%   Weight: (!) 141 kg (310 lb)   Height: 175.3 cm (69\")       Physical Exam:  General: Alert and oriented.  Neck: Jugular venous pressure is within normal limits. Carotids have normal upstrokes without bruits.   Cardiovascular: Heart has a nondisplaced focal PMI. Regular rate and rhythm. No murmur, gallop or rub.  Lungs: Clear, no rales or wheezes. Equal expansion is noted.   Extremities: Show no edema.  Skin: Warm and dry.  Neurologic: " Nonfocal.     Diagnostic Data (reviewed with patient):    Lab date: 7/1/2021  • FLP: , , HDL 32.5, .2  • CMP: Glu 117, BUN 17, Creat 1.01, eGFR >60, Na 141, K 4.0, Cl 104, CO2 28, Ca 9.4, Alk Phos 72, AST 17, ALT 22  • CBC: WBC 5.9, RBC 5.17, HGB 14.1, HCT 43.9, MCV 85, MCH 27.3,   • HbA1c: 7.3      Lab date: 8/14/2020  · FLP: , , HDL 32,   · CMP: Glu 117, BUN 21, Creat 1.13, eGFR >60, Na 141, K 4.2, Cl 103, CO2 29, Ca 9.4, Alk Phos 66, AST 18, ALT 20  · CBC: WBC 4.6, RBC 5.06, HGB 14.3, HCT 43.0, MCV 85, MCH 28.3,   · HbA1c: 7.2%      Procedures      Assessment:    ICD-10-CM ICD-9-CM   1. Essential hypertension  I10 401.9   2. Bilateral lower extremity edema.  R60.0 782.3         Plan:  1. Stable cardiac status.  2. Begin routine aerobic exercise for at least 30 minutes 5 days per week to help improve cholesterol.   3. Continue on losartan 100 mg and Maxzide 75-50 mg daily for hypertension.   4. Continue on HCTZ 25 mg daily for fluid retention.  5. Continue all other current medications.  6. F/up in 12 months, sooner if needed.    Scribed for Suzan Ellis MD by Chacha Jones. 9/30/2021 10:41 EDT      I Suzan Ellis MD personally performed the services described in this documentation as scribed by the above individual in my presence, and it is both accurate and complete.    Suzan Ellis MD, FACC

## 2021-09-30 ENCOUNTER — OFFICE VISIT (OUTPATIENT)
Dept: CARDIOLOGY | Facility: CLINIC | Age: 61
End: 2021-09-30

## 2021-09-30 VITALS
HEIGHT: 69 IN | OXYGEN SATURATION: 96 % | HEART RATE: 64 BPM | DIASTOLIC BLOOD PRESSURE: 66 MMHG | WEIGHT: 310 LBS | BODY MASS INDEX: 45.91 KG/M2 | SYSTOLIC BLOOD PRESSURE: 128 MMHG

## 2021-09-30 DIAGNOSIS — R60.0 LOWER EXTREMITY EDEMA: ICD-10-CM

## 2021-09-30 DIAGNOSIS — I10 ESSENTIAL HYPERTENSION: Primary | ICD-10-CM

## 2021-09-30 PROCEDURE — 99213 OFFICE O/P EST LOW 20 MIN: CPT | Performed by: INTERNAL MEDICINE

## 2021-09-30 RX ORDER — FLASH GLUCOSE SENSOR
1 KIT MISCELLANEOUS CONTINUOUS
COMMUNITY
Start: 2021-09-20

## 2021-09-30 RX ORDER — SEMAGLUTIDE 1.34 MG/ML
0.25 INJECTION, SOLUTION SUBCUTANEOUS WEEKLY
COMMUNITY
End: 2022-01-10

## 2021-10-20 ENCOUNTER — OFFICE VISIT (OUTPATIENT)
Dept: NEUROLOGY | Facility: CLINIC | Age: 61
End: 2021-10-20

## 2021-10-20 ENCOUNTER — NURSE NAVIGATOR (OUTPATIENT)
Dept: NEUROLOGY | Facility: CLINIC | Age: 61
End: 2021-10-20

## 2021-10-20 VITALS
OXYGEN SATURATION: 95 % | HEIGHT: 69 IN | HEART RATE: 60 BPM | TEMPERATURE: 97.3 F | BODY MASS INDEX: 45.91 KG/M2 | DIASTOLIC BLOOD PRESSURE: 68 MMHG | SYSTOLIC BLOOD PRESSURE: 122 MMHG | WEIGHT: 310 LBS

## 2021-10-20 DIAGNOSIS — G47.33 OBSTRUCTIVE APNEA: Primary | ICD-10-CM

## 2021-10-20 PROCEDURE — 99212 OFFICE O/P EST SF 10 MIN: CPT | Performed by: NURSE PRACTITIONER

## 2021-10-20 NOTE — PROGRESS NOTES
Subjective:     Patient ID: Tyrell Guzmán is a 61 y.o. male.    CC:   Chief Complaint   Patient presents with   • Sleep Apnea       HPI:   History of Present Illness   60 y.o. male with DIPIKA returns in follow up.  Last visit on 10/15/20 renewed mask and supplies.        No new or worsening sx.       Compliant with CPAP and receiving benefit.       Averaging 7 to 8 hours a night using CPAP 12 cm H20.  Restorative sleep with CPAP.     AHI 0.8.      Using nasal pillows, having trouble with opening mouth in sleep, may be helpful to add a chin strap.      Weight is stable      DME:  [employer]     The following portions of the patient's history were reviewed and updated as appropriate: allergies, current medications, past family history, past medical history, past social history, past surgical history and problem list.    Past Medical History:   Diagnosis Date   • Abnormal EKG    • Bilateral lower extremity edema.    • Colon cancer (HCC)    • Colon cancer (HCC)    • CPAP (continuous positive airway pressure) dependence    • Diabetes mellitus (HCC)    • Enlarged prostate    • History of chemotherapy     x6 months.   • History of radiation therapy 06/18/2020    prostate bed s/p prostatectomy   • Hypertension    • Obesity    • Prostate cancer (HCC)    • Sleep apnea    • Wears glasses        Past Surgical History:   Procedure Laterality Date   • COLECTOMY PARTIAL / TOTAL     • COLONOSCOPY  2017   • PROSTATE SURGERY  01/10/2020   • PROSTATECTOMY N/A 1/10/2020    Procedure: PROSTATECTOMY LAPAROSCOPIC WITH DAVINCI ROBOT;  Surgeon: Andrés Dietz MD;  Location: [facility];  Service: DaVinci   • TUMOR REMOVAL Right 2016    Shoulder- Benign   • VASECTOMY         Social History     Socioeconomic History   • Marital status:    Tobacco Use   • Smoking status: Never Smoker   • Smokeless tobacco: Never Used   Vaping Use   • Vaping Use: Never used   Substance and Sexual Activity   • Alcohol use: Yes   • Drug  "use: No   • Sexual activity: Defer       Family History   Problem Relation Age of Onset   • Hyperlipidemia Other    • Heart disease Mother    • Diabetes Father    • Stroke Father    • Cancer Father    • Heart disease Father    • Hypertension Father    • No Known Problems Sister    • Heart attack Sister         Objective:  /68   Pulse 60   Temp 97.3 °F (36.3 °C)   Ht 175.3 cm (69.02\")   Wt (!) 141 kg (310 lb)   SpO2 95%   BMI 45.76 kg/m²     Neurologic Exam     Mental Status   Oriented to person, place, and time.   Follows 3 step commands.   Attention: normal. Concentration: normal.   Speech: speech is normal   Level of consciousness: alert  Knowledge: good and consistent with education.   Able to name object. Able to read. Able to repeat. Able to write. Normal comprehension.     Cranial Nerves     CN II   Visual fields full to confrontation.   Visual acuity: normal  Right visual field deficit: none  Left visual field deficit: none     CN III, IV, VI   Pupils are equal, round, and reactive to light.  Extraocular motions are normal.   Right pupil: Shape: regular. Reactivity: brisk. Consensual response: intact.   Left pupil: Shape: regular. Reactivity: brisk. Consensual response: intact.   Nystagmus: none   Diplopia: none  Ophthalmoparesis: none  Upgaze: normal  Downgaze: normal  Conjugate gaze: present  Vestibulo-ocular reflex: present    CN V   Facial sensation intact.   Right corneal reflex: normal  Left corneal reflex: normal    CN VII   Right facial weakness: none  Left facial weakness: none    CN VIII   Hearing: intact    CN IX, X   Palate: symmetric  Right gag reflex: normal  Left gag reflex: normal    CN XI   Right sternocleidomastoid strength: normal  Left sternocleidomastoid strength: normal    CN XII   Tongue: not atrophic  Fasciculations: absent  Tongue deviation: none    Motor Exam   Muscle bulk: normal  Overall muscle tone: normal  Right arm tone: normal  Left arm tone: normal  Right leg tone: " normal  Left leg tone: normal    Strength   Strength 5/5 throughout.     Sensory Exam   Light touch normal.     Gait, Coordination, and Reflexes     Gait  Gait: normal    Coordination   Finger to nose coordination: normal    Tremor   Resting tremor: absent  Intention tremor: absent  Action tremor: absent    Reflexes   Reflexes 2+ except as noted.       Physical Exam  Vitals and nursing note reviewed.   Constitutional:       Appearance: Normal appearance.   HENT:      Head: Normocephalic and atraumatic.   Eyes:      Extraocular Movements: Extraocular movements intact and EOM normal.      Pupils: Pupils are equal, round, and reactive to light.   Skin:     General: Skin is warm and dry.   Neurological:      Mental Status: He is alert and oriented to person, place, and time.      Coordination: Finger-Nose-Finger Test normal.      Gait: Gait is intact.      Deep Tendon Reflexes: Strength normal.   Psychiatric:         Mood and Affect: Mood normal.         Speech: Speech normal.         Assessment/Plan:       Diagnoses and all orders for this visit:    1. Obstructive apnea (Primary)  Assessment & Plan:  Stable on CPAP    Add chin strap    Sent new orders to [facility]     F/U in 1 year or sooner if needed             Reviewed medications, potential side effects and signs and symptoms to report. Discussed risk versus benefits of treatment plan with patient and/or family-including medications, labs and radiology that may be ordered. Addressed questions and concerns during visit. Patient and/or family verbalized understanding and agree with plan. Patient instructed to call the office with questions or concerns and report to ED with life-threatening symptoms.     AS THE PROVIDER, I PERSONALLY WORE PPE DURING ENTIRE FACE TO FACE ENCOUNTER IN CLINIC WITH THE PATIENT. PATIENT ALSO WORE PPE DURING ENTIRE FACE TO FACE ENCOUNTER EXCEPT FOR A MAX OF 30 SECONDS DURING NEUROLOGICAL EVALUATION OF CRANIAL NERVES AND THEN MASK WAS PLACED BACK  OVER PATIENT FACE FOR REMAINDER OF VISIT. I WASHED MY HANDS BEFORE AND AFTER VISIT.    During this visit the following were done:  Labs Reviewed []    Labs Ordered []    Radiology Reports Reviewed []    Radiology Ordered []    PCP Records Reviewed []    Referring Provider Records Reviewed []    ER Records Reviewed []    Hospital Records Reviewed []    History Obtained From Family []    Radiology Images Reviewed []    Other Reviewed []    Records Requested []      Return in about 1 year (around 10/20/2022).      Shea Farias, JER  10/20/2021

## 2021-11-01 RX ORDER — HYDROCHLOROTHIAZIDE 25 MG/1
TABLET ORAL
Qty: 90 TABLET | Refills: 3 | Status: SHIPPED | OUTPATIENT
Start: 2021-11-01 | End: 2022-07-18 | Stop reason: SDUPTHER

## 2021-11-22 ENCOUNTER — HOSPITAL ENCOUNTER (OUTPATIENT)
Dept: GENERAL RADIOLOGY | Facility: HOSPITAL | Age: 61
Discharge: HOME | End: 2021-11-22
Admitting: INTERNAL MEDICINE

## 2021-11-22 ENCOUNTER — TRANSCRIBE ORDERS (OUTPATIENT)
Dept: ADMINISTRATIVE | Facility: HOSPITAL | Age: 61
End: 2021-11-22

## 2021-11-22 ENCOUNTER — HOSPITAL ENCOUNTER (OUTPATIENT)
Dept: CT IMAGING | Facility: HOSPITAL | Age: 61
Discharge: HOME | End: 2021-11-22
Admitting: INTERNAL MEDICINE

## 2021-11-22 DIAGNOSIS — R14.0 ABDOMINAL DISTENTION: ICD-10-CM

## 2021-11-22 DIAGNOSIS — R14.0 ABDOMINAL DISTENTION: Primary | ICD-10-CM

## 2021-11-22 DIAGNOSIS — M25.551 HIP PAIN, RIGHT: Primary | ICD-10-CM

## 2021-11-22 PROCEDURE — 74177 CT ABD & PELVIS W/CONTRAST: CPT

## 2021-11-22 PROCEDURE — 25010000002 IOPAMIDOL 61 % SOLUTION: Performed by: INTERNAL MEDICINE

## 2021-11-22 PROCEDURE — 73502 X-RAY EXAM HIP UNI 2-3 VIEWS: CPT

## 2021-11-22 RX ADMIN — IOPAMIDOL 100 ML: 612 INJECTION, SOLUTION INTRAVENOUS at 16:02

## 2021-12-02 RX ORDER — TRIAMTERENE AND HYDROCHLOROTHIAZIDE 75; 50 MG/1; MG/1
TABLET ORAL
Qty: 90 TABLET | Refills: 3 | Status: SHIPPED | OUTPATIENT
Start: 2021-12-02

## 2021-12-17 RX ORDER — ROSUVASTATIN CALCIUM 5 MG/1
TABLET, COATED ORAL
Qty: 90 TABLET | Refills: 2 | Status: SHIPPED | OUTPATIENT
Start: 2021-12-17 | End: 2022-09-13

## 2022-01-10 ENCOUNTER — OFFICE VISIT (OUTPATIENT)
Dept: ORTHOPEDIC SURGERY | Facility: CLINIC | Age: 62
End: 2022-01-10

## 2022-01-10 VITALS
WEIGHT: 310.85 LBS | SYSTOLIC BLOOD PRESSURE: 130 MMHG | DIASTOLIC BLOOD PRESSURE: 76 MMHG | HEIGHT: 69 IN | BODY MASS INDEX: 46.04 KG/M2

## 2022-01-10 DIAGNOSIS — M16.0 PRIMARY OSTEOARTHRITIS OF BOTH HIPS: Primary | ICD-10-CM

## 2022-01-10 PROCEDURE — 99214 OFFICE O/P EST MOD 30 MIN: CPT | Performed by: ORTHOPAEDIC SURGERY

## 2022-01-10 RX ORDER — IBUPROFEN 800 MG/1
TABLET ORAL
COMMUNITY

## 2022-01-10 RX ORDER — FAMOTIDINE 20 MG/1
TABLET, FILM COATED ORAL AS NEEDED
COMMUNITY

## 2022-01-10 NOTE — PROGRESS NOTES
[facility]    Subjective     Chief Complaint   Patient presents with   • Follow-up     13 month recheck - Primary osteoarthritis of both hips        HPI    It has been 13  month(s) since Mr. Guzmán's last visit. He returns to clinic today for follow-up of bilateral hip arthritis. The issue has been ongoing for 1 year(s). He rates his pain a 6/10 on the pain scale. Previous/current treatments: NSAIDS. Current symptoms: pain and stiffness. The pain is worse with sleeping and lying on affected side; pain medication and/or NSAID improve the pain. Overall, he is doing worse.  Right hip is not his major source of pain, with minor pain in the left hip.  He had an x-ray taken in November.  Pain is primarily on the lateral aspect of the hip.    I have reviewed the following portions of the patient's history and agree with: History of Present Illness and Review of Systems    Patient Active Problem List   Diagnosis   • Acute bronchitis   • Obstructive apnea   • Essential hypertension   • Bilateral lower extremity edema.   • Abnormal EKG   • Obesity   • History of colon cancer   • Prostate cancer (HCC)   • Diabetes (HCC)   • Status post prostatectomy, lap robotic     Past Medical History:   Diagnosis Date   • Abnormal EKG    • Bilateral lower extremity edema.    • Colon cancer (HCC)    • Colon cancer (HCC)    • CPAP (continuous positive airway pressure) dependence    • Diabetes mellitus (HCC)    • Enlarged prostate    • History of chemotherapy     x6 months.   • History of radiation therapy 06/18/2020    prostate bed s/p prostatectomy   • Hypertension    • Obesity    • Prostate cancer (HCC)    • Sleep apnea    • Wears glasses       Past Surgical History:   Procedure Laterality Date   • COLECTOMY PARTIAL / TOTAL     • COLONOSCOPY  2017   • PROSTATE SURGERY  01/10/2020   • PROSTATECTOMY N/A 1/10/2020    Procedure: PROSTATECTOMY LAPAROSCOPIC WITH DAVINCI ROBOT;  Surgeon: Andrés Dietz MD;   Location: [facility] OR;  Service: [facility]   • TUMOR REMOVAL Right 2016    Shoulder- Benign   • VASECTOMY        Family History   Problem Relation Age of Onset   • Hyperlipidemia Other    • Heart disease Mother    • Diabetes Father    • Stroke Father    • Cancer Father    • Heart disease Father    • Hypertension Father    • No Known Problems Sister    • Heart attack Sister      Social History     Socioeconomic History   • Marital status:    Tobacco Use   • Smoking status: Never Smoker   • Smokeless tobacco: Never Used   Vaping Use   • Vaping Use: Never used   Substance and Sexual Activity   • Alcohol use: Yes   • Drug use: No   • Sexual activity: Defer      Current Outpatient Medications on File Prior to Visit   Medication Sig Dispense Refill   • B Complex Vitamins (VITAMIN B COMPLEX) capsule capsule Take 1 capsule by mouth Daily.     • cholecalciferol (VITAMIN D3) 1.25 MG (37695 UT) capsule Take 50,000 Units by mouth Every 7 (Seven) Days.     • famotidine (PEPCID) 20 MG tablet Take  by mouth As Needed.     • hydroCHLOROthiazide (HYDRODIURIL) 25 MG tablet TAKE 1 TABLET DAILY 90 tablet 3   • ibuprofen (ADVIL,MOTRIN) 800 MG tablet if needed.     • losartan (COZAAR) 100 MG tablet TAKE 1 TABLET DAILY (Patient taking differently: Take 100 mg by mouth Every Morning.) 90 tablet 3   • metFORMIN (GLUCOPHAGE) 850 MG tablet      • rosuvastatin (CRESTOR) 5 MG tablet TAKE 1 TABLET DAILY 90 tablet 2   • triamterene-hydrochlorothiazide (MAXZIDE) 75-50 MG per tablet TAKE 1 TABLET DAILY 90 tablet 3   • Continuous Blood Gluc Sensor (FreeStyle Hebert 14 Day Sensor) misc Apply 1 Device topically to the appropriate area as directed Continuous.     • Multiple Vitamins-Minerals (CENTRUM SILVER 50+MEN) tablet Take 1 tablet by mouth Daily.     • [DISCONTINUED] Semaglutide,0.25 or 0.5MG/DOS, (Ozempic, 0.25 or 0.5 MG/DOSE,) 2 MG/1.5ML solution pen-injector Inject 0.25 mg under the skin into the appropriate area as directed 1 (One) Time Per  Week.     • [DISCONTINUED] tadalafil (CIALIS) 20 MG tablet Take 1 tablet by mouth Daily As Needed for Erectile Dysfunction. 30 tablet 6     No current facility-administered medications on file prior to visit.      Allergies   Allergen Reactions   • Lisinopril Cough        Review of Systems   Constitutional: Negative for activity change, appetite change, chills, diaphoresis, fatigue, fever and unexpected weight change.   HENT: Negative for congestion, dental problem, drooling, ear discharge, ear pain, facial swelling, hearing loss, mouth sores, nosebleeds, postnasal drip, rhinorrhea, sinus pressure, sneezing, sore throat, tinnitus, trouble swallowing and voice change.    Eyes: Negative for photophobia, pain, discharge, redness, itching and visual disturbance.   Respiratory: Negative for apnea, cough, choking, chest tightness, shortness of breath, wheezing and stridor.    Cardiovascular: Negative for chest pain, palpitations and leg swelling.   Gastrointestinal: Negative for abdominal distention, abdominal pain, anal bleeding, blood in stool, constipation, diarrhea, nausea, rectal pain and vomiting.   Endocrine: Negative for cold intolerance, heat intolerance, polydipsia, polyphagia and polyuria.   Genitourinary: Negative for decreased urine volume, difficulty urinating, dysuria, enuresis, flank pain, frequency, genital sores, hematuria and urgency.   Musculoskeletal: Positive for arthralgias and joint swelling. Negative for back pain, gait problem, myalgias, neck pain and neck stiffness.   Skin: Negative for color change, pallor, rash and wound.   Allergic/Immunologic: Negative for environmental allergies, food allergies and immunocompromised state.   Neurological: Negative for dizziness, tremors, seizures, syncope, facial asymmetry, speech difficulty, weakness, light-headedness, numbness and headaches.   Hematological: Negative for adenopathy. Does not bruise/bleed easily.   Psychiatric/Behavioral: Negative for  "agitation, behavioral problems, confusion, decreased concentration, dysphoric mood, hallucinations, self-injury, sleep disturbance and suicidal ideas. The patient is not nervous/anxious and is not hyperactive.         Objective      Physical Exam  /76   Ht 175.3 cm (69.02\")   Wt (!) 141 kg (310 lb 13.6 oz)   BMI 45.88 kg/m²     Body mass index is 45.88 kg/m².    General:   Mental Status:  Alert   Appearance: Cooperative, in no acute distress   Build and Nutrition: Obese by BMI male   Orientation: Alert and oriented to person, place and time   Posture: Normal   Gait: Mildly antalgic on the right    Lower Extremities:              Right Hip:                          Swelling:          None                          Crepitus:          None                          Atrophy:           None                          Range of motion:        External Rotation:       20°                                                              Internal Rotation:        10°                                                              Flexion:                       100°                                                              Extension:                   0°           Instability:        None  Deformities:     None  Functional testing: Negative Stinchfield  No leg length discrepancy              Left Hip:                          Swelling:          None                          Crepitus:          None                          Atrophy:           None                          Range of motion:        External Rotation:       30°                                                              Internal Rotation:        10°                                                              Flexion:                       100°                                                              Extension:                   0°           Instability:        None  Deformities:     None  Functional testing: [facility]                          No " leg length discrepancy    Imaging/Studies  Imaging Results (Last 24 Hours)     ** No results found for the last 24 hours. **        EXAMINATION: XR HIP W OR WO PELVIS 2-3 VIEW RIGHT- 11/22/2021     INDICATION: M25.551-Pain in right hip      COMPARISON: Hip x-rays and pelvis performed 12/07/2020     FINDINGS: Bilateral advanced degenerative changes of the hips with  remodeling right greater than left with severe right hip and moderate to  severe left hip DJD without acute fracture. No displaced pelvic ring  fracture. Degenerative changes of the visualized lumbosacral spine.        IMPRESSION:  Advanced degenerative changes of the bilateral hips with  mild asymmetry right severe and moderate to severe left involvement  without acute fracture.     D: 11/22/2021  E: 11/22/2021     This report was finalized on 11/22/2021 3:03 PM by Dr. Simone Tavares.    I personally reviewed the radiographs and report, and agree with findings, with worsening arthritis in the right hip in particular.      Assessment and Plan     Diagnoses and all orders for this visit:    1. Primary osteoarthritis of both hips (Primary)  -     External Facility Surgical/Procedural Request; Future        1. Primary osteoarthritis of both hips        I reviewed my findings with the patient.  He has primarily right hip pain, with milder left hip pain.  We discussed treatment options for his advanced right hip arthritis, which I believe is the major source of his pain.  Ultimately he is a candidate for hip replacement surgery when appropriate, but he would like to try an intra-articular hip injection for both diagnostic and therapeutic purposes and we will set that up at a mutually convenient time.    Return in about 4 weeks (around 2/7/2022) for After Hip Injection.      Shaq High MD  01/10/22  08:17 EST

## 2022-01-20 ENCOUNTER — HOSPITAL ENCOUNTER (OUTPATIENT)
Dept: RADIATION ONCOLOGY | Facility: HOSPITAL | Age: 62
Setting detail: RADIATION/ONCOLOGY SERIES
Discharge: HOME | End: 2022-01-20

## 2022-01-20 ENCOUNTER — OFFICE VISIT (OUTPATIENT)
Dept: RADIATION ONCOLOGY | Facility: HOSPITAL | Age: 62
End: 2022-01-20

## 2022-01-20 VITALS
BODY MASS INDEX: 46.21 KG/M2 | HEIGHT: 69 IN | DIASTOLIC BLOOD PRESSURE: 72 MMHG | RESPIRATION RATE: 17 BRPM | HEART RATE: 62 BPM | TEMPERATURE: 98 F | OXYGEN SATURATION: 97 % | SYSTOLIC BLOOD PRESSURE: 155 MMHG | WEIGHT: 312 LBS

## 2022-01-20 DIAGNOSIS — C61 PROSTATE CANCER: Primary | ICD-10-CM

## 2022-01-20 PROCEDURE — G0463 HOSPITAL OUTPT CLINIC VISIT: HCPCS

## 2022-01-20 NOTE — PROGRESS NOTES
FOLLOW UP NOTE    PATIENT:                                                      Tyrell Guzmán  MEDICAL RECORD #:                        [id number]  :                                                          1960  COMPLETION DATE:   2020  DIAGNOSIS:     Prostate cancer (HCC)  Preop clinical- Stage IIC (cT1c, cN0, cM0, PSA: 6.1, Grade Group: 3)  Pathologic- Stage (pT3b, pNX, cM0, PSA: 6.1, Grade Group: 3)      BRIEF HISTORY:    Routine follow-up visit for high risk prostate cancer, status post prostatectomy 2020.  Postsurgical PSA was undetectable.  He underwent adjuvant radiotherapy for adverse risk features including seminal vesicle invasion and positive surgical margin.  He tolerated treatment well.  PSA continues to remain undetectable with last PSA value less than 0.04 ng/ml on 2021.  He is due for another PSA value to be drawn at his next rescheduled urology appointment.  He is voiding well with no acute urinary complaints today.  He is not having any significant urinary incontinence and does not wear a pad.  No dysuria or hematuria.  Bowels are regular.  He reports erectile function as poor.  Oral ED medications rendered ineffective and he has used Trimix injections with fairly good effect.  No new aches or pains.  Overall, he feels well.   Of note, he has a history of stage III colorectal cancer in extended remission after primary resection in  followed by adjuvant chemotherapy.  He last saw his medical oncologist, Dr. Whyte, in 2018.  Blood work and imaging studies showed no evidence of recurrent or metastatic disease.  He is no longer following up with medical oncology.  He continues to have frequent scheduled surveillance colonoscopies with Dr. Martin, last performed 2021.  He remains in remission from his colorectal cancer.  He has at least annual follow-up and blood work with his primary care physician.    MEDICATIONS: Medication reconciliation for the  patient was reviewed and confirmed in the electronic medical record.    Review of Systems   All other systems reviewed and are negative.              IPSS Questionnaire (AUA-7):  Over the past month…     1)  Incomplete Emptying  How often have you had a sensation of not emptying your bladder?  0 - Not at all   2)  Frequency  How often have you had to urinate less than every two hours? 1 - Less than 1 time in 5   3)  Intermittency  How often have you found you stopped and started again several times when you urinated?  0 - Not at all   4) Urgency  How often have you found it difficult to postpone urination?  0 -not at all   5) Weak Stream  How often have you had a weak urinary stream?  0 - Not at all   6) Straining  How often have you had to push or strain to begin urination?  0 - Not at all   7) Nocturia  How many times did you typically get up at night to urinate?  1 - 1 time   Total Score:  2         Quality of life due to urinary symptoms:  If you were to spend the rest of your life with your urinary condition the way it is now, how would you feel about that? 1 -pleased   Urine Leakage (Incontinence) 1-Mild (A few drops a day, no pad use)      Sexual Health Inventory  Current Status     1)  How do you rate your confidence that you could achieve and keep an erection? 1-Very Low   2) When you had erections with sexual stimulation, how often were your erections hard enough for penetration (entering your partner)? 1-Almost never or never   3)  During sexual intercourse, how often were you able to maintain your erection after you had penetrated (entered) into your partner? 1-Almost never or never   4) During sexual intercourse, how difficult was it to maintain your erection to completion of intercourse? 1-Extremely difficult   5) When you attempted sexual intercourse, how often was it satisfactory to you? 1-Almost never or never   Total Score: 5         Bowel Health Inventory  Current Status: 0-No problems, no rectal  "bleeding, no discharge, less then 5 bowel movements a day                Physical Exam  Vitals and nursing note reviewed.   Constitutional:       Appearance: He is well-developed.   HENT:      Head: Normocephalic and atraumatic.   Cardiovascular:      Rate and Rhythm: Normal rate and regular rhythm.      Heart sounds: Normal heart sounds. No murmur heard.      Pulmonary:      Effort: Pulmonary effort is normal.      Breath sounds: Normal breath sounds. No wheezing or rales.   Chest:   Breasts:      Right: No supraclavicular adenopathy.      Left: No supraclavicular adenopathy.       Abdominal:      General: Bowel sounds are normal. There is no distension.      Palpations: Abdomen is soft.      Tenderness: There is no abdominal tenderness.   Musculoskeletal:         General: No tenderness. Normal range of motion.      Cervical back: Normal range of motion and neck supple.   Lymphadenopathy:      Cervical: No cervical adenopathy.      Upper Body:      Right upper body: No supraclavicular adenopathy.      Left upper body: No supraclavicular adenopathy.   Skin:     General: Skin is warm and dry.   Neurological:      Mental Status: He is alert and oriented to person, place, and time.      Sensory: No sensory deficit.   Psychiatric:         Behavior: Behavior normal.         Thought Content: Thought content normal.         Judgment: Judgment normal.         VITAL SIGNS:   Vitals:    01/20/22 0817   BP: 155/72   Pulse: 62   Resp: 17   Temp: 98 °F (36.7 °C)   SpO2: 97%  Comment: RA   Weight: (!) 142 kg (312 lb)   Height: 175.3 cm (69\")   PainSc: 0-No pain                   KSP %:  90    The following portions of the patient's history were reviewed and updated as appropriate: allergies, current medications, past family history, past medical history, past social history, past surgical history and problem list.         Diagnoses and all orders for this visit:    1. Prostate cancer (HCC) (Primary)         IMPRESSION:  Prostate " cancer, Dimitri score 4+3 = 7, clinical stage IIC (T1c, N0, M0), pre-surgical PSA 6.1 ng/ml.  Status post prostatectomy 1/11/2020 with final pathologic stage IIIB (T3b, Nx, M0).  He is now  1 year 7 months status post adjuvant radiotherapy.  PSA remains undetectable.  No evidence of biochemical recurrence.    History of colorectal cancer, also continued disease remission status.    RECOMMENDATIONS: He will continue urology surveillance under the care of Dr. Dietz.  He is currently overdue for an appointment and PSA value but will schedule that to be done in the near future.  I will be happy to see him as needed.  He will notify me if he is ever found to have detectable/rising PSA values or if any symptoms develop related to our radiotherapy treatment.  He will continue colorectal surveillance under the care of Dr. Martin.  He will continue at least annual blood work and general medical care with Dr. Villalobos.    Return if symptoms worsen or fail to improve.    Kenyon Brand MD

## 2022-02-11 ENCOUNTER — OUTSIDE FACILITY SERVICE (OUTPATIENT)
Dept: ORTHOPEDIC SURGERY | Facility: CLINIC | Age: 62
End: 2022-02-11

## 2022-02-11 PROCEDURE — 20610 DRAIN/INJ JOINT/BURSA W/O US: CPT | Performed by: ORTHOPAEDIC SURGERY

## 2022-02-11 PROCEDURE — 77002 NEEDLE LOCALIZATION BY XRAY: CPT | Performed by: ORTHOPAEDIC SURGERY

## 2022-03-14 ENCOUNTER — OFFICE VISIT (OUTPATIENT)
Dept: ORTHOPEDIC SURGERY | Facility: CLINIC | Age: 62
End: 2022-03-14

## 2022-03-14 VITALS
WEIGHT: 312.4 LBS | HEIGHT: 69 IN | SYSTOLIC BLOOD PRESSURE: 146 MMHG | DIASTOLIC BLOOD PRESSURE: 84 MMHG | BODY MASS INDEX: 46.27 KG/M2

## 2022-03-14 DIAGNOSIS — M16.11 PRIMARY OSTEOARTHRITIS OF RIGHT HIP: Primary | ICD-10-CM

## 2022-03-14 PROCEDURE — 99212 OFFICE O/P EST SF 10 MIN: CPT | Performed by: ORTHOPAEDIC SURGERY

## 2022-03-14 NOTE — PROGRESS NOTES
[facility] [place] Orthopaedic Surgery Clinic Note    Subjective     Chief Complaint   Patient presents with   • Follow-up     4 week follow up after right hip injection 2/11/22 -- Primary osteoarthritis of both hips         HPI    It has been 2  month(s) since Mr. Guzmán's last visit. He returns to clinic today for follow-up of right hip arthritis. The issue has been ongoing for 1 year(s). He rates his pain a 6/10 on the pain scale. Previous/current treatments: weight loss. Current symptoms: pain. The pain is worse with sleeping and lying on affected side; resting and pain medication and/or NSAID improve the pain. Overall, he is doing better.  Injection helped out significantly.    I have reviewed the following portions of the patient's history and agree with: History of Present Illness and Review of Systems    Patient Active Problem List   Diagnosis   • Acute bronchitis   • Obstructive apnea   • Essential hypertension   • Bilateral lower extremity edema.   • Abnormal EKG   • Obesity   • History of colon cancer   • Prostate cancer (HCC)   • Diabetes (HCC)   • Status post prostatectomy, lap robotic     Past Medical History:   Diagnosis Date   • Abnormal EKG    • Bilateral lower extremity edema.    • Colon cancer (HCC)    • Colon cancer (HCC)    • CPAP (continuous positive airway pressure) dependence    • Diabetes mellitus (HCC)    • Enlarged prostate    • History of chemotherapy     x6 months.   • History of radiation therapy 06/18/2020    prostate bed s/p prostatectomy   • Hypertension    • Obesity    • Prostate cancer (HCC)    • Sleep apnea    • Wears glasses       Past Surgical History:   Procedure Laterality Date   • COLECTOMY PARTIAL / TOTAL     • COLONOSCOPY  2017   • PROSTATE SURGERY  01/10/2020   • PROSTATECTOMY N/A 1/10/2020    Procedure: PROSTATECTOMY LAPAROSCOPIC WITH DAVINCI ROBOT;  Surgeon: Andrés Dietz MD;  Location: [facility];  Service: DaVinci   • TUMOR REMOVAL Right 2016    Shoulder- Benign   •  VASECTOMY        Family History   Problem Relation Age of Onset   • Hyperlipidemia Other    • Heart disease Mother    • Diabetes Father    • Stroke Father    • Cancer Father    • Heart disease Father    • Hypertension Father    • No Known Problems Sister    • Heart attack Sister      Social History     Socioeconomic History   • Marital status:    Tobacco Use   • Smoking status: Never Smoker   • Smokeless tobacco: Never Used   Vaping Use   • Vaping Use: Never used   Substance and Sexual Activity   • Alcohol use: Yes   • Drug use: No   • Sexual activity: Defer      Current Outpatient Medications on File Prior to Visit   Medication Sig Dispense Refill   • B Complex Vitamins (VITAMIN B COMPLEX) capsule capsule Take 1 capsule by mouth Daily.     • cholecalciferol (VITAMIN D3) 1.25 MG (71600 UT) capsule Take 50,000 Units by mouth Every 7 (Seven) Days.     • Continuous Blood Gluc Sensor (ActiveReplayyle Hebert 14 Day Sensor) misc Apply 1 Device topically to the appropriate area as directed Continuous.     • famotidine (PEPCID) 20 MG tablet Take  by mouth As Needed.     • hydroCHLOROthiazide (HYDRODIURIL) 25 MG tablet TAKE 1 TABLET DAILY 90 tablet 3   • ibuprofen (ADVIL,MOTRIN) 800 MG tablet if needed.     • losartan (COZAAR) 100 MG tablet TAKE 1 TABLET DAILY (Patient taking differently: Take 100 mg by mouth Every Morning.) 90 tablet 3   • metFORMIN (GLUCOPHAGE) 850 MG tablet      • Multiple Vitamins-Minerals (CENTRUM SILVER 50+MEN) tablet Take 1 tablet by mouth Daily.     • rosuvastatin (CRESTOR) 5 MG tablet TAKE 1 TABLET DAILY 90 tablet 2   • triamterene-hydrochlorothiazide (MAXZIDE) 75-50 MG per tablet TAKE 1 TABLET DAILY 90 tablet 3     No current facility-administered medications on file prior to visit.      Allergies   Allergen Reactions   • Lisinopril Cough        Review of Systems   Constitutional: Negative for activity change, appetite change, chills, diaphoresis, fatigue, fever and unexpected weight change.  "  HENT: Negative for congestion, dental problem, drooling, ear discharge, ear pain, facial swelling, hearing loss, mouth sores, nosebleeds, postnasal drip, rhinorrhea, sinus pressure, sneezing, sore throat, tinnitus, trouble swallowing and voice change.    Eyes: Negative for photophobia, pain, discharge, redness, itching and visual disturbance.   Respiratory: Negative for apnea, cough, choking, chest tightness, shortness of breath, wheezing and stridor.    Cardiovascular: Negative for chest pain, palpitations and leg swelling.   Gastrointestinal: Negative for abdominal distention, abdominal pain, anal bleeding, blood in stool, constipation, diarrhea, nausea, rectal pain and vomiting.   Endocrine: Negative for cold intolerance, heat intolerance, polydipsia, polyphagia and polyuria.   Genitourinary: Negative for decreased urine volume, difficulty urinating, dysuria, enuresis, flank pain, frequency, genital sores, hematuria and urgency.   Musculoskeletal: Positive for arthralgias. Negative for back pain, gait problem, joint swelling, myalgias, neck pain and neck stiffness.   Skin: Negative for color change, pallor, rash and wound.   Allergic/Immunologic: Negative for environmental allergies, food allergies and immunocompromised state.   Neurological: Negative for dizziness, tremors, seizures, syncope, facial asymmetry, speech difficulty, weakness, light-headedness, numbness and headaches.   Hematological: Negative for adenopathy. Does not bruise/bleed easily.   Psychiatric/Behavioral: Negative for agitation, behavioral problems, confusion, decreased concentration, dysphoric mood, hallucinations, self-injury, sleep disturbance and suicidal ideas. The patient is not nervous/anxious and is not hyperactive.         Objective      Physical Exam  /84   Ht 175.3 cm (69.02\")   Wt (!) 142 kg (312 lb 6.4 oz)   BMI 46.11 kg/m²     Body mass index is 46.11 kg/m².    General:   Mental Status:  Alert   Appearance: " Cooperative, in no acute distress   Build and Nutrition: Obese by BMI male   Orientation: Alert and oriented to person, place and time   Posture: Normal   Gait: Mildly antalgic on the right    Lower Extremities:              Right Hip:                          Swelling:          None                          Crepitus:          None                          Atrophy:           None                          Range of motion:        External Rotation:       20°                                                              Internal Rotation:        10°                                                              Flexion:                       100°                                                              Extension:                   0°           Instability:        None  Deformities:     None  Functional testing: Negative Stinchfield  No leg length discrepancy    Imaging/Studies  Imaging Results (Last 24 Hours)     ** No results found for the last 24 hours. **        No new imaging today    Assessment and Plan     Diagnoses and all orders for this visit:    1. Primary osteoarthritis of right hip (Primary)        1. Primary osteoarthritis of right hip        I reviewed my findings with the patient.  He responded well to the intra-articular hip injection, which is still providing some relief.  At this point, I will see him back in 5 months but sooner for any problems.  Ultimately he is a candidate for hip replacement surgery.  We did discuss weight loss again today, and the goal would be BMI less than 40 before proceeding with surgery.    Return in about 5 months (around 8/14/2022).      Shaq High MD  03/14/22  13:00 EDT

## 2022-05-02 ENCOUNTER — TELEPHONE (OUTPATIENT)
Dept: ORTHOPEDIC SURGERY | Facility: CLINIC | Age: 62
End: 2022-05-02

## 2022-05-02 NOTE — TELEPHONE ENCOUNTER
Jasmyn,    I explained to Jose that we cannot give pain meds for arthritis. He would like to know when and if he could get another intraarticular injection in the hip. Please advise thank you!  ginny

## 2022-05-02 NOTE — TELEPHONE ENCOUNTER
Patient would like to know if he could get some pain medication for the hip pain he is having or would like to know if  would suggest injection instead?

## 2022-05-03 NOTE — TELEPHONE ENCOUNTER
Unfortunately, another intra-articular hip injection can not be given until mid August 2022--when he is 6 months out from initial injection given 2/11/2022.    Recommend OTC NSAIDs/pain medication. If he requires stronger pain medication then he'll have to contact his PCP for a prescription.    Needs to continue working on weight loss, as he is a candidate for MAGGI but needs a BMI less than 40.

## 2022-07-18 RX ORDER — HYDROCHLOROTHIAZIDE 25 MG/1
25 TABLET ORAL DAILY
Qty: 30 TABLET | Refills: 0 | Status: SHIPPED | OUTPATIENT
Start: 2022-07-18 | End: 2022-07-19 | Stop reason: SDUPTHER

## 2022-07-18 NOTE — TELEPHONE ENCOUNTER
Will have labs with pcp in august- he will call so that I can request the results. [facility] but for now [facility]

## 2022-07-19 RX ORDER — HYDROCHLOROTHIAZIDE 25 MG/1
25 TABLET ORAL DAILY
Qty: 90 TABLET | Refills: 1 | Status: SHIPPED | OUTPATIENT
Start: 2022-07-19 | End: 2022-10-19 | Stop reason: ALTCHOICE

## 2022-08-15 ENCOUNTER — OFFICE VISIT (OUTPATIENT)
Dept: ORTHOPEDIC SURGERY | Facility: CLINIC | Age: 62
End: 2022-08-15

## 2022-08-15 VITALS
SYSTOLIC BLOOD PRESSURE: 130 MMHG | WEIGHT: 315 LBS | DIASTOLIC BLOOD PRESSURE: 84 MMHG | BODY MASS INDEX: 46.65 KG/M2 | HEIGHT: 69 IN

## 2022-08-15 DIAGNOSIS — M16.11 PRIMARY OSTEOARTHRITIS OF RIGHT HIP: Primary | ICD-10-CM

## 2022-08-15 DIAGNOSIS — E66.01 OBESITY, MORBID, BMI 40.0-49.9: ICD-10-CM

## 2022-08-15 PROCEDURE — 99212 OFFICE O/P EST SF 10 MIN: CPT | Performed by: ORTHOPAEDIC SURGERY

## 2022-08-15 RX ORDER — MELOXICAM 15 MG/1
15 TABLET ORAL DAILY
COMMUNITY
Start: 2022-07-26

## 2022-08-15 NOTE — PROGRESS NOTES
[facility] [place] [facility] Note    Subjective     Chief Complaint   Patient presents with   • Follow-up     5 month follow up -- Primary osteoarthritis of right hip        HPI    It has been 5  month(s) since Mr. Guzmán's last visit. He returns to clinic today for follow-up of right hip arthritis. The issue has been ongoing for 1 year(s). He rates his pain a 6/10 on the pain scale. Previous/current treatments: NSAIDS and weight loss. Current symptoms: pain. The pain is worse with standing, climbing stairs, sleeping and working; pain medication and/or NSAID improve the pain. Overall, he is doing better.  Still working on weight loss.    I have reviewed the following portions of the patient's history and agree with: History of Present Illness and Review of Systems    Patient Active Problem List   Diagnosis   • Acute bronchitis   • Obstructive apnea   • Essential hypertension   • Bilateral lower extremity edema.   • Abnormal EKG   • Obesity   • History of colon cancer   • Prostate cancer (HCC)   • Diabetes (HCC)   • Status post prostatectomy, lap robotic     Past Medical History:   Diagnosis Date   • Abnormal EKG    • Bilateral lower extremity edema.    • Colon cancer (HCC)    • Colon cancer (HCC)    • CPAP (continuous positive airway pressure) dependence    • Diabetes mellitus (HCC)    • Enlarged prostate    • History of chemotherapy     x6 months.   • History of radiation therapy 06/18/2020    prostate bed s/p prostatectomy   • Hypertension    • Obesity    • Prostate cancer (HCC)    • Sleep apnea    • Wears glasses       Past Surgical History:   Procedure Laterality Date   • COLECTOMY PARTIAL / TOTAL     • COLONOSCOPY  2017   • PROSTATE SURGERY  01/10/2020   • PROSTATECTOMY N/A 1/10/2020    Procedure: PROSTATECTOMY LAPAROSCOPIC WITH DAVINCI ROBOT;  Surgeon: Andrés Dietz MD;  Location: [facility];  Service: DaVinci   • TUMOR REMOVAL Right 2016    Shoulder- Benign   • VASECTOMY        Family  History   Problem Relation Age of Onset   • Hyperlipidemia Other    • Heart disease Mother    • Diabetes Father    • Stroke Father    • Cancer Father    • Heart disease Father    • Hypertension Father    • No Known Problems Sister    • Heart attack Sister      Social History     Socioeconomic History   • Marital status:    Tobacco Use   • Smoking status: Never Smoker   • Smokeless tobacco: Never Used   Vaping Use   • Vaping Use: Never used   Substance and Sexual Activity   • Alcohol use: Yes   • Drug use: No   • Sexual activity: Defer      Current Outpatient Medications on File Prior to Visit   Medication Sig Dispense Refill   • B Complex Vitamins (VITAMIN B COMPLEX) capsule capsule Take 1 capsule by mouth Daily.     • cholecalciferol (VITAMIN D3) 1.25 MG (25213 UT) capsule Take 50,000 Units by mouth Every 7 (Seven) Days.     • Continuous Blood Gluc Sensor (AqdotStyle Hebert 14 Day Sensor) misc Apply 1 Device topically to the appropriate area as directed Continuous.     • famotidine (PEPCID) 20 MG tablet Take  by mouth As Needed.     • hydroCHLOROthiazide (HYDRODIURIL) 25 MG tablet Take 1 tablet by mouth Daily. 90 tablet 1   • ibuprofen (ADVIL,MOTRIN) 800 MG tablet if needed.     • losartan (COZAAR) 100 MG tablet TAKE 1 TABLET DAILY (Patient taking differently: Take 100 mg by mouth Every Morning.) 90 tablet 3   • meloxicam (MOBIC) 15 MG tablet      • metFORMIN (GLUCOPHAGE) 850 MG tablet      • Multiple Vitamins-Minerals (CENTRUM SILVER 50+MEN) tablet Take 1 tablet by mouth Daily.     • rosuvastatin (CRESTOR) 5 MG tablet TAKE 1 TABLET DAILY 90 tablet 2   • triamterene-hydrochlorothiazide (MAXZIDE) 75-50 MG per tablet TAKE 1 TABLET DAILY 90 tablet 3     No current facility-administered medications on file prior to visit.      Allergies   Allergen Reactions   • Lisinopril Cough        Review of Systems   Constitutional: Negative for activity change, appetite change, chills, diaphoresis, fatigue, fever and  "unexpected weight change.   HENT: Negative for congestion, dental problem, drooling, ear discharge, ear pain, facial swelling, hearing loss, mouth sores, nosebleeds, postnasal drip, rhinorrhea, sinus pressure, sneezing, sore throat, tinnitus, trouble swallowing and voice change.    Eyes: Negative for photophobia, pain, discharge, redness, itching and visual disturbance.   Respiratory: Negative for apnea, cough, choking, chest tightness, shortness of breath, wheezing and stridor.    Cardiovascular: Negative for chest pain, palpitations and leg swelling.   Gastrointestinal: Negative for abdominal distention, abdominal pain, anal bleeding, blood in stool, constipation, diarrhea, nausea, rectal pain and vomiting.   Endocrine: Negative for cold intolerance, heat intolerance, polydipsia, polyphagia and polyuria.   Genitourinary: Negative for decreased urine volume, difficulty urinating, dysuria, enuresis, flank pain, frequency, genital sores, hematuria and urgency.   Musculoskeletal: Positive for arthralgias. Negative for back pain, gait problem, joint swelling, myalgias, neck pain and neck stiffness.   Skin: Negative for color change, pallor, rash and wound.   Allergic/Immunologic: Negative for environmental allergies, food allergies and immunocompromised state.   Neurological: Negative for dizziness, tremors, seizures, syncope, facial asymmetry, speech difficulty, weakness, light-headedness, numbness and headaches.   Hematological: Negative for adenopathy. Does not bruise/bleed easily.   Psychiatric/Behavioral: Negative for agitation, behavioral problems, confusion, decreased concentration, dysphoric mood, hallucinations, self-injury, sleep disturbance and suicidal ideas. The patient is not nervous/anxious and is not hyperactive.         Objective      Physical Exam  /84   Ht 175.3 cm (69.02\")   Wt (!) 143 kg (315 lb)   BMI 46.50 kg/m²     Body mass index is 46.5 kg/m².    General:   Mental Status:  " Alert   Appearance: Cooperative, in no acute distress   Build and Nutrition: Obese by BMI male   Orientation: Alert and oriented to person, place and time   Posture: Normal   Gait: Nonantalgic    Lower Extremities:              Right Hip:                          Swelling:          None                          Crepitus:          None                          Atrophy:           None                          Range of motion:        External Rotation:       20°                                                              Internal Rotation:        10°                                                              Flexion:                       100°                                                              Extension:                   0°           Instability:        None  Deformities:     None  Functional testing: Negative Stinchfield  No leg length discrepancy    Imaging/Studies  Imaging Results (Last 24 Hours)     ** No results found for the last 24 hours. **        No new imaging today.    Assessment and Plan     Diagnoses and all orders for this visit:    1. Primary osteoarthritis of right hip (Primary)    2. Obesity, morbid, BMI 40.0-49.9 (HCC)        1. Primary osteoarthritis of right hip    2. Obesity, morbid, BMI 40.0-49.9 (HCC)        I reviewed my findings with the patient.  Right hip continues to bother him, but overall improved from his last visit.  He is continue with meloxicam.  He will follow-up in 6 months with a new x-ray, but sooner for any problems.  He continues to work on weight loss.  We did have a discussion again about body mass index below 40 before proceeding with surgical intervention.    Return in about 6 months (around 2/15/2023) for Recheck with X-Rays.      Shaq High MD  08/15/22  08:24 EDT

## 2022-09-13 RX ORDER — ROSUVASTATIN CALCIUM 5 MG/1
TABLET, COATED ORAL
Qty: 90 TABLET | Refills: 0 | Status: SHIPPED | OUTPATIENT
Start: 2022-09-13 | End: 2022-12-12

## 2022-09-23 ENCOUNTER — TELEPHONE (OUTPATIENT)
Dept: ORTHOPEDIC SURGERY | Facility: CLINIC | Age: 62
End: 2022-09-23

## 2022-09-23 NOTE — TELEPHONE ENCOUNTER
PATIENT CALLED TO REQUEST DIABETIC INSERTS AND WOULD LIKE AN ORDER SENT TO THE [facility]. PATIENT CAN BE REACHED [phone].

## 2022-09-26 NOTE — TELEPHONE ENCOUNTER
Faxed new script to [facility] foot center and called patient and let him know. He had no further problems or questions at this time.     Renata

## 2022-10-19 ENCOUNTER — OFFICE VISIT (OUTPATIENT)
Dept: CARDIOLOGY | Facility: CLINIC | Age: 62
End: 2022-10-19

## 2022-10-19 VITALS
HEART RATE: 54 BPM | WEIGHT: 315 LBS | DIASTOLIC BLOOD PRESSURE: 90 MMHG | OXYGEN SATURATION: 98 % | SYSTOLIC BLOOD PRESSURE: 152 MMHG | BODY MASS INDEX: 46.65 KG/M2 | HEIGHT: 69 IN

## 2022-10-19 DIAGNOSIS — R60.0 LOWER EXTREMITY EDEMA: ICD-10-CM

## 2022-10-19 DIAGNOSIS — I10 ESSENTIAL HYPERTENSION: Primary | ICD-10-CM

## 2022-10-19 PROCEDURE — 99214 OFFICE O/P EST MOD 30 MIN: CPT | Performed by: INTERNAL MEDICINE

## 2022-10-19 PROCEDURE — 93000 ELECTROCARDIOGRAM COMPLETE: CPT | Performed by: INTERNAL MEDICINE

## 2022-10-19 RX ORDER — TIRZEPATIDE 2.5 MG/.5ML
INJECTION, SOLUTION SUBCUTANEOUS WEEKLY
COMMUNITY
Start: 2022-10-18

## 2022-10-19 RX ORDER — VALACYCLOVIR HYDROCHLORIDE 1 G/1
TABLET, FILM COATED ORAL DAILY
COMMUNITY

## 2022-10-19 RX ORDER — BUMETANIDE 0.5 MG/1
0.5 TABLET ORAL DAILY
Qty: 30 TABLET | Refills: 11 | Status: SHIPPED | OUTPATIENT
Start: 2022-10-19 | End: 2023-01-13 | Stop reason: SDUPTHER

## 2022-10-24 ENCOUNTER — OFFICE VISIT (OUTPATIENT)
Dept: NEUROLOGY | Facility: CLINIC | Age: 62
End: 2022-10-24

## 2022-10-24 VITALS
HEART RATE: 88 BPM | BODY MASS INDEX: 46.12 KG/M2 | WEIGHT: 311.4 LBS | DIASTOLIC BLOOD PRESSURE: 86 MMHG | SYSTOLIC BLOOD PRESSURE: 116 MMHG | OXYGEN SATURATION: 98 % | HEIGHT: 69 IN | TEMPERATURE: 97.3 F

## 2022-10-24 DIAGNOSIS — R40.0 DAYTIME SOMNOLENCE: Primary | ICD-10-CM

## 2022-10-24 PROBLEM — C61 MALIGNANT TUMOR OF PROSTATE: Status: ACTIVE | Noted: 2020-01-24

## 2022-10-24 PROBLEM — G47.33 OBSTRUCTIVE SLEEP APNEA: Status: ACTIVE | Noted: 2022-10-24

## 2022-10-24 PROBLEM — E11.65 TYPE 2 DIABETES MELLITUS WITH HYPERGLYCEMIA, WITHOUT LONG-TERM CURRENT USE OF INSULIN (HCC): Status: ACTIVE | Noted: 2022-10-24

## 2022-10-24 PROBLEM — E66.01 MORBID OBESITY WITH BODY MASS INDEX (BMI) OF 45.0 TO 49.9 IN ADULT (HCC): Status: ACTIVE | Noted: 2022-10-24

## 2022-10-24 PROBLEM — E66.9 OBESITY: Status: ACTIVE | Noted: 2022-04-12

## 2022-10-24 PROBLEM — I10 HYPERTENSION: Status: ACTIVE | Noted: 2022-10-24

## 2022-10-24 PROBLEM — Z85.46 HISTORY OF ADENOCARCINOMA OF PROSTATE: Status: ACTIVE | Noted: 2022-10-24

## 2022-10-24 PROBLEM — Z79.899 ENCOUNTER FOR DRUG THERAPY: Status: ACTIVE | Noted: 2022-10-24

## 2022-10-24 PROBLEM — M16.11 PRIMARY OSTEOARTHRITIS OF RIGHT HIP: Status: ACTIVE | Noted: 2022-10-24

## 2022-10-24 PROBLEM — L30.9 ECZEMA: Status: ACTIVE | Noted: 2022-10-24

## 2022-10-24 PROBLEM — R97.20 RAISED PROSTATE SPECIFIC ANTIGEN: Status: ACTIVE | Noted: 2019-08-07

## 2022-10-24 PROBLEM — D13.2 ADENOMATOUS DUODENAL POLYP: Status: ACTIVE | Noted: 2022-10-24

## 2022-10-24 PROCEDURE — 99214 OFFICE O/P EST MOD 30 MIN: CPT | Performed by: NURSE PRACTITIONER
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
## 2022-11-01 ENCOUNTER — LAB (OUTPATIENT)
Dept: LAB | Facility: HOSPITAL | Age: 62
End: 2022-11-01

## 2022-11-01 DIAGNOSIS — R60.0 LOWER EXTREMITY EDEMA: ICD-10-CM

## 2022-11-01 DIAGNOSIS — I10 ESSENTIAL HYPERTENSION: ICD-10-CM

## 2022-11-01 DIAGNOSIS — R40.0 DAYTIME SOMNOLENCE: ICD-10-CM

## 2022-11-01 LAB
ANION GAP SERPL CALCULATED.3IONS-SCNC: 9.7 MMOL/L (ref 5–15)
BASOPHILS # BLD AUTO: 0.04 10*3/MM3 (ref 0–0.2)
BASOPHILS NFR BLD AUTO: 0.6 % (ref 0–1.5)
BUN SERPL-MCNC: 16 MG/DL (ref 8–23)
BUN/CREAT SERPL: 12.3 (ref 7–25)
CALCIUM SPEC-SCNC: 9.8 MG/DL (ref 8.6–10.5)
CHLORIDE SERPL-SCNC: 100 MMOL/L (ref 98–107)
CO2 SERPL-SCNC: 29.3 MMOL/L (ref 22–29)
CREAT SERPL-MCNC: 1.3 MG/DL (ref 0.76–1.27)
DEPRECATED RDW RBC AUTO: 40.8 FL (ref 37–54)
EGFRCR SERPLBLD CKD-EPI 2021: 62.9 ML/MIN/1.73
EOSINOPHIL # BLD AUTO: 0.22 10*3/MM3 (ref 0–0.4)
EOSINOPHIL NFR BLD AUTO: 3.4 % (ref 0.3–6.2)
ERYTHROCYTE [DISTWIDTH] IN BLOOD BY AUTOMATED COUNT: 13.2 % (ref 12.3–15.4)
GLUCOSE SERPL-MCNC: 124 MG/DL (ref 65–99)
HCT VFR BLD AUTO: 47.9 % (ref 37.5–51)
HGB BLD-MCNC: 15.7 G/DL (ref 13–17.7)
IMM GRANULOCYTES # BLD AUTO: 0.02 10*3/MM3 (ref 0–0.05)
IMM GRANULOCYTES NFR BLD AUTO: 0.3 % (ref 0–0.5)
LYMPHOCYTES # BLD AUTO: 1.19 10*3/MM3 (ref 0.7–3.1)
LYMPHOCYTES NFR BLD AUTO: 18.5 % (ref 19.6–45.3)
MCH RBC QN AUTO: 27.8 PG (ref 26.6–33)
MCHC RBC AUTO-ENTMCNC: 32.8 G/DL (ref 31.5–35.7)
MCV RBC AUTO: 84.9 FL (ref 79–97)
MONOCYTES # BLD AUTO: 0.7 10*3/MM3 (ref 0.1–0.9)
MONOCYTES NFR BLD AUTO: 10.9 % (ref 5–12)
NEUTROPHILS NFR BLD AUTO: 4.26 10*3/MM3 (ref 1.7–7)
NEUTROPHILS NFR BLD AUTO: 66.3 % (ref 42.7–76)
NRBC BLD AUTO-RTO: 0 /100 WBC (ref 0–0.2)
PLATELET # BLD AUTO: 245 10*3/MM3 (ref 140–450)
PMV BLD AUTO: 10.4 FL (ref 6–12)
POTASSIUM SERPL-SCNC: 3.6 MMOL/L (ref 3.5–5.2)
RBC # BLD AUTO: 5.64 10*6/MM3 (ref 4.14–5.8)
SODIUM SERPL-SCNC: 139 MMOL/L (ref 136–145)
TSH SERPL DL<=0.05 MIU/L-ACNC: 3.25 UIU/ML (ref 0.27–4.2)
VIT B12 BLD-MCNC: 1019 PG/ML (ref 211–946)
WBC NRBC COR # BLD: 6.43 10*3/MM3 (ref 3.4–10.8)

## 2022-11-01 PROCEDURE — 36415 COLL VENOUS BLD VENIPUNCTURE: CPT

## 2022-11-01 PROCEDURE — 80048 BASIC METABOLIC PNL TOTAL CA: CPT

## 2022-11-01 PROCEDURE — 82607 VITAMIN B-12: CPT

## 2022-11-01 PROCEDURE — 84443 ASSAY THYROID STIM HORMONE: CPT

## 2022-11-01 PROCEDURE — 85025 COMPLETE CBC W/AUTO DIFF WBC: CPT

## 2022-11-02 ENCOUNTER — TELEPHONE (OUTPATIENT)
Dept: NEUROLOGY | Facility: CLINIC | Age: 62
End: 2022-11-02

## 2022-11-02 NOTE — TELEPHONE ENCOUNTER
Called patient and gave results.  Patient was understanding and appreciative.    ----- Message from Alton Lomas DNP, JER sent at 11/2/2022  9:45 AM EDT -----  Please notify pt labs are stable except for slightly elevated glucose.

## 2022-11-23 ENCOUNTER — TELEPHONE (OUTPATIENT)
Dept: CARDIOLOGY | Facility: CLINIC | Age: 62
End: 2022-11-23

## 2022-11-23 DIAGNOSIS — I10 ESSENTIAL HYPERTENSION: Primary | ICD-10-CM

## 2022-11-23 DIAGNOSIS — E11.65 TYPE 2 DIABETES MELLITUS WITH HYPERGLYCEMIA, WITHOUT LONG-TERM CURRENT USE OF INSULIN: ICD-10-CM

## 2022-12-09 ENCOUNTER — LAB (OUTPATIENT)
Dept: LAB | Facility: HOSPITAL | Age: 62
End: 2022-12-09

## 2022-12-09 DIAGNOSIS — E11.65 TYPE 2 DIABETES MELLITUS WITH HYPERGLYCEMIA, WITHOUT LONG-TERM CURRENT USE OF INSULIN: ICD-10-CM

## 2022-12-09 DIAGNOSIS — I10 ESSENTIAL HYPERTENSION: ICD-10-CM

## 2022-12-09 LAB
ANION GAP SERPL CALCULATED.3IONS-SCNC: 11 MMOL/L (ref 5–15)
BUN SERPL-MCNC: 17 MG/DL (ref 8–23)
BUN/CREAT SERPL: 15.6 (ref 7–25)
CALCIUM SPEC-SCNC: 10.1 MG/DL (ref 8.6–10.5)
CHLORIDE SERPL-SCNC: 101 MMOL/L (ref 98–107)
CO2 SERPL-SCNC: 29 MMOL/L (ref 22–29)
CREAT SERPL-MCNC: 1.09 MG/DL (ref 0.76–1.27)
EGFRCR SERPLBLD CKD-EPI 2021: 77.7 ML/MIN/1.73
GLUCOSE SERPL-MCNC: 112 MG/DL (ref 65–99)
POTASSIUM SERPL-SCNC: 3.9 MMOL/L (ref 3.5–5.2)
SODIUM SERPL-SCNC: 141 MMOL/L (ref 136–145)

## 2022-12-09 PROCEDURE — 80048 BASIC METABOLIC PNL TOTAL CA: CPT

## 2022-12-09 PROCEDURE — 36415 COLL VENOUS BLD VENIPUNCTURE: CPT

## 2022-12-12 RX ORDER — ROSUVASTATIN CALCIUM 5 MG/1
TABLET, COATED ORAL
Qty: 90 TABLET | Refills: 2 | Status: SHIPPED | OUTPATIENT
Start: 2022-12-12

## 2022-12-16 ENCOUNTER — TELEPHONE (OUTPATIENT)
Dept: ORTHOPEDIC SURGERY | Facility: CLINIC | Age: 62
End: 2022-12-16

## 2023-01-04 ENCOUNTER — TELEPHONE (OUTPATIENT)
Dept: NEUROLOGY | Facility: CLINIC | Age: 63
End: 2023-01-04
Payer: COMMERCIAL

## 2023-01-04 NOTE — TELEPHONE ENCOUNTER
Called and left message that we do not have instructions on how to clean the CPAP machine and she would need to call the company to get instructions.

## 2023-01-04 NOTE — TELEPHONE ENCOUNTER
Caller: Serina Guzmán    Relationship: Emergency Contact    Best call back number: [phone]    Who are you requesting to speak with (clinical staff, provider,  specific staff member):   DR FLEMING    What was the call regarding:   CPAP MACHINE   PT HAD COVID ON 12-23-22. PT'S WIFE CALLED THE CPAP COMPANY, [employer], TO SEE HOW THE PT NEEDED TO CLEAN THE CPAP AFTER HAVING COVID.    I TOLD PT'S WIFE TO CALL THE CPAP COMPANY AND SHE HAD ALREADY DONE THAT AND WAS TOLD TO CALL PT'S PROVIDER TO ASK HOW IT NEEDED TO BE CLEANED. PT'S WIFE HAS ORDERED NEW TUBING FOR THE PT.    Do you require a callback:   YES, PLEASE.

## 2023-01-13 RX ORDER — BUMETANIDE 0.5 MG/1
0.5 TABLET ORAL DAILY
Qty: 90 TABLET | Refills: 1 | Status: SHIPPED | OUTPATIENT
Start: 2023-01-13

## 2023-01-13 NOTE — TELEPHONE ENCOUNTER
Lab Results   Component Value Date    GLUCOSE 112 (H) 12/09/2022    CALCIUM 10.1 12/09/2022     12/09/2022    K 3.9 12/09/2022    CO2 29.0 12/09/2022     12/09/2022    BUN 17 12/09/2022    CREATININE 1.09 12/09/2022    EGFRIFAFRI 90 01/11/2020    BCR 15.6 12/09/2022    ANIONGAP 11.0 12/09/2022

## 2023-02-15 ENCOUNTER — OFFICE VISIT (OUTPATIENT)
Dept: ORTHOPEDIC SURGERY | Facility: CLINIC | Age: 63
End: 2023-02-15
Payer: COMMERCIAL

## 2023-02-15 VITALS
BODY MASS INDEX: 45.47 KG/M2 | HEIGHT: 69 IN | DIASTOLIC BLOOD PRESSURE: 76 MMHG | SYSTOLIC BLOOD PRESSURE: 134 MMHG | WEIGHT: 307 LBS

## 2023-02-15 DIAGNOSIS — M16.11 PRIMARY OSTEOARTHRITIS OF RIGHT HIP: Primary | ICD-10-CM

## 2023-02-15 PROCEDURE — 99213 OFFICE O/P EST LOW 20 MIN: CPT | Performed by: ORTHOPAEDIC SURGERY

## 2023-02-15 RX ORDER — LOSARTAN POTASSIUM 100 MG/1
TABLET ORAL EVERY 24 HOURS
COMMUNITY

## 2023-02-15 RX ORDER — MELOXICAM 15 MG/1
TABLET ORAL EVERY 24 HOURS
COMMUNITY

## 2023-02-15 NOTE — PROGRESS NOTES
[facility] [place]    Subjective     Chief Complaint   Patient presents with   • Follow-up     6 month recheck - Primary osteoarthritis of right hip        HPI    It has been 6  month(s) since Mr. Guzmán's last visit. He returns to clinic today for follow-up of right hip arthritis. The issue has been ongoing for 2 year(s). He rates his pain a 5/10 on the pain scale. Previous/current treatments: weight loss and viscosupplementation (last injection 2/11/22). Current symptoms: pain. The pain is worse with walking, standing and lying on affected side; resting and pain medication and/or NSAID improve the pain. Overall, he is doing better.  He is thinking about surgery early next year before he retires.  In the meantime he is thinking about doing another hip injection, which helped for about a month the last time he had an injection.    I have reviewed the following portions of the patient's history and agree with: History of Present Illness and Review of Systems    Patient Active Problem List   Diagnosis   • Acute bronchitis   • Obstructive apnea   • Essential hypertension   • Bilateral lower extremity edema.   • Abnormal EKG   • Obesity   • History of colon cancer   • Prostate cancer (HCC)   • Diabetes (HCC)   • Status post prostatectomy, lap robotic   • Adenomatous duodenal polyp   • Eczema   • Encounter for drug therapy   • History of adenocarcinoma of prostate   • Primary osteoarthritis of right hip   • Raised prostate specific antigen   • Type 2 diabetes mellitus with hyperglycemia, without long-term current use of insulin (HCC)   • Hypertension   • Morbid obesity with body mass index (BMI) of 45.0 to 49.9 in adult (HCC)   • Obesity   • Obstructive sleep apnea   • Malignant tumor of prostate (HCC)     Past Medical History:   Diagnosis Date   • Abnormal EKG    • Bilateral lower extremity edema.    • Colon cancer (HCC)    • Colon cancer (HCC)    • CPAP (continuous positive airway pressure)  dependence    • Diabetes mellitus (HCC)    • Enlarged prostate    • History of chemotherapy     x6 months.   • History of radiation therapy 06/18/2020    prostate bed s/p prostatectomy   • Hypertension    • Obesity    • Prostate cancer (HCC)    • Sleep apnea    • Wears glasses       Past Surgical History:   Procedure Laterality Date   • COLECTOMY PARTIAL / TOTAL     • COLONOSCOPY  2017   • PROSTATE SURGERY  01/10/2020   • PROSTATECTOMY N/A 1/10/2020    Procedure: PROSTATECTOMY LAPAROSCOPIC WITH DAVINCI ROBOT;  Surgeon: Andrés Dietz MD;  Location: [facility];  Service: DaVinci   • TUMOR REMOVAL Right 2016    Shoulder- Benign   • VASECTOMY        Family History   Problem Relation Age of Onset   • Hyperlipidemia Other    • Heart disease Mother    • Diabetes Father    • Stroke Father    • Cancer Father    • Heart disease Father    • Hypertension Father    • No Known Problems Sister    • Heart attack Sister      Social History     Socioeconomic History   • Marital status:    Tobacco Use   • Smoking status: Never   • Smokeless tobacco: Never   Vaping Use   • Vaping Use: Never used   Substance and Sexual Activity   • Alcohol use: Yes   • Drug use: No   • Sexual activity: Defer      Current Outpatient Medications on File Prior to Visit   Medication Sig Dispense Refill   • bumetanide (BUMEX) 0.5 MG tablet Take 1 tablet by mouth Daily. 90 tablet 1   • cholecalciferol (VITAMIN D3) 1.25 MG (97457 UT) capsule Take 50,000 Units by mouth Every 7 (Seven) Days.     • Continuous Blood Gluc Sensor (FreeStyle Hebert 14 Day Sensor) misc Apply 1 Device topically to the appropriate area as directed Continuous.     • famotidine (PEPCID) 20 MG tablet Take  by mouth As Needed.     • ibuprofen (ADVIL,MOTRIN) 800 MG tablet if needed.     • losartan (COZAAR) 100 MG tablet TAKE 1 TABLET DAILY (Patient taking differently: Take 100 mg by mouth Every Morning.) 90 tablet 3   • losartan (COZAAR) 100 MG tablet Daily.     • meloxicam  (MOBIC) 15 MG tablet Take 1 tablet by mouth Daily.     • Mounjaro 2.5 MG/0.5ML solution pen-injector 1 (One) Time Per Week.     • Multiple Vitamins-Minerals (CENTRUM SILVER 50+MEN) tablet Take 1 tablet by mouth Daily.     • rosuvastatin (CRESTOR) 5 MG tablet TAKE 1 TABLET DAILY 90 tablet 2   • triamterene-hydrochlorothiazide (MAXZIDE) 75-50 MG per tablet TAKE 1 TABLET DAILY 90 tablet 3   • valACYclovir (VALTREX) 1000 MG tablet Daily.     • meloxicam (MOBIC) 15 MG tablet Daily.       No current facility-administered medications on file prior to visit.      Allergies   Allergen Reactions   • Lisinopril Cough        Review of Systems   Constitutional: Negative for activity change, appetite change, chills, diaphoresis, fatigue, fever and unexpected weight change.   HENT: Negative for congestion, dental problem, drooling, ear discharge, ear pain, facial swelling, hearing loss, mouth sores, nosebleeds, postnasal drip, rhinorrhea, sinus pressure, sneezing, sore throat, tinnitus, trouble swallowing and voice change.    Eyes: Negative for photophobia, pain, discharge, redness, itching and visual disturbance.   Respiratory: Negative for apnea, cough, choking, chest tightness, shortness of breath, wheezing and stridor.    Cardiovascular: Negative for chest pain, palpitations and leg swelling.   Gastrointestinal: Negative for abdominal distention, abdominal pain, anal bleeding, blood in stool, constipation, diarrhea, nausea, rectal pain and vomiting.   Endocrine: Negative for cold intolerance, heat intolerance, polydipsia, polyphagia and polyuria.   Genitourinary: Negative for decreased urine volume, difficulty urinating, dysuria, enuresis, flank pain, frequency, genital sores, hematuria and urgency.   Musculoskeletal: Positive for arthralgias. Negative for back pain, gait problem, joint swelling, myalgias, neck pain and neck stiffness.   Skin: Negative for color change, pallor, rash and wound.   Allergic/Immunologic: Negative  "for environmental allergies, food allergies and immunocompromised state.   Neurological: Negative for dizziness, tremors, seizures, syncope, facial asymmetry, speech difficulty, weakness, light-headedness, numbness and headaches.   Hematological: Negative for adenopathy. Does not bruise/bleed easily.   Psychiatric/Behavioral: Negative for agitation, behavioral problems, confusion, decreased concentration, dysphoric mood, hallucinations, self-injury, sleep disturbance and suicidal ideas. The patient is not nervous/anxious and is not hyperactive.         Objective      Physical Exam  /76   Ht 175.3 cm (69.02\")   Wt (!) 139 kg (307 lb)   BMI 45.31 kg/m²     Body mass index is 45.31 kg/m².    General:   Mental Status:  Alert   Appearance: Cooperative, in no acute distress   Build and Nutrition: Obese by BMI male   Orientation: Alert and oriented to person, place and time   Posture: Normal   Gait: Mild limp on the right    Lower Extremities:              Right Hip:                          Swelling:          None                          Crepitus:          None                          Atrophy:           None                          Range of motion:        External Rotation:       20°                                                              Internal Rotation:        10°                                                              Flexion:                       90°                                                              Extension:                   0°           Instability:        None  Deformities:     None  Functional testing: Negative [facility]  No leg length discrepancy    Imaging/Studies  Imaging Results (Last 24 Hours)     Procedure Component Value Units Date/Time    XR Hip With or Without Pelvis 2 - 3 View Right [[id number]] Resulted: 02/15/23 0812     Updated: 02/15/23 0813    Narrative:      Right Hip Radiographs  Indication: right hip pain  Views: low AP pelvis and lateral of the right " hip    Comparison: 11/22/2021    Findings:   Severe arthritis, right hip, with bone-on-bone contact, osteophytes of the   head neck junction, no acute bony abnormalities.  Worsening compared to   the previous imaging.  No unusual bony features.            Assessment and Plan     Diagnoses and all orders for this visit:    1. Primary osteoarthritis of right hip (Primary)  -     XR Hip With or Without Pelvis 2 - 3 View Right  -     External Facility Surgical/Procedural Request; Future        1. Primary osteoarthritis of right hip        I reviewed my findings with patient.  We discussed options for his hip again today, and he is going to continue to work on weight loss, and he lost 8 pounds since his last visit.  Body mass index of less than 40 is our goal.  He would like to have a repeat injection in the meantime, we will go ahead and schedule that.    Return in about 6 months (around 8/15/2023) for After Hip Injection.      Shaq High MD  02/15/23  08:30 EST

## 2023-03-03 ENCOUNTER — TRANSCRIBE ORDERS (OUTPATIENT)
Dept: ADMINISTRATIVE | Facility: HOSPITAL | Age: 63
End: 2023-03-03
Payer: COMMERCIAL

## 2023-03-03 DIAGNOSIS — I83.811 VARICOSE VEINS OF RIGHT LOWER EXTREMITY WITH PAIN: Primary | ICD-10-CM

## 2023-03-17 ENCOUNTER — HOSPITAL ENCOUNTER (OUTPATIENT)
Dept: CARDIOLOGY | Facility: HOSPITAL | Age: 63
Discharge: HOME | End: 2023-03-17
Admitting: SURGERY
Payer: COMMERCIAL

## 2023-03-17 VITALS — BODY MASS INDEX: 42.98 KG/M2 | WEIGHT: 307 LBS | HEIGHT: 71 IN

## 2023-03-17 DIAGNOSIS — I83.811 VARICOSE VEINS OF RIGHT LOWER EXTREMITY WITH PAIN: ICD-10-CM

## 2023-03-17 LAB
BH CV LOW VAS RIGHT GREATER SAPH BK VESSEL: 1
BH CV LOW VAS RIGHT GSV DIST THIGH VESSEL: 1
BH CV LOW VAS RIGHT GSV MID THIGH VESSEL: 1
BH CV LOW VAS RIGHT PROFUNDA FEM NOT VIS SCRIPTING: 1
BH CV LOW VAS RIGHT VARICOSITY BK VESSEL: 1
BH CV LOWER VAS RIGHT GSV DIST THIGH COMPRESSIBILTY: NORMAL
BH CV LOWER VAS RIGHT GSV MID CALF COMPRESSIBILTY: NORMAL
BH CV LOWER VAS RIGHT GSV MID THIGH COMPRESSIBILTY: NORMAL
BH CV LOWER VASCULAR LEFT COMMON FEMORAL AUGMENT: NORMAL
BH CV LOWER VASCULAR LEFT COMMON FEMORAL COMPETENT: NORMAL
BH CV LOWER VASCULAR LEFT COMMON FEMORAL COMPRESS: NORMAL
BH CV LOWER VASCULAR LEFT COMMON FEMORAL PHASIC: NORMAL
BH CV LOWER VASCULAR LEFT COMMON FEMORAL SPONT: NORMAL
BH CV LOWER VASCULAR RIGHT AA GSV COMPETENT: NORMAL
BH CV LOWER VASCULAR RIGHT AA GSV COMPRESS: NORMAL
BH CV LOWER VASCULAR RIGHT COMMON FEMORAL AUGMENT: NORMAL
BH CV LOWER VASCULAR RIGHT COMMON FEMORAL COMPETENT: NORMAL
BH CV LOWER VASCULAR RIGHT COMMON FEMORAL COMPRESS: NORMAL
BH CV LOWER VASCULAR RIGHT COMMON FEMORAL PHASIC: NORMAL
BH CV LOWER VASCULAR RIGHT COMMON FEMORAL SPONT: NORMAL
BH CV LOWER VASCULAR RIGHT DISTAL FEMORAL AUGMENT: NORMAL
BH CV LOWER VASCULAR RIGHT DISTAL FEMORAL COMPETENT: NORMAL
BH CV LOWER VASCULAR RIGHT DISTAL FEMORAL COMPRESS: NORMAL
BH CV LOWER VASCULAR RIGHT DISTAL FEMORAL PHASIC: NORMAL
BH CV LOWER VASCULAR RIGHT DISTAL FEMORAL SPONT: NORMAL
BH CV LOWER VASCULAR RIGHT GASTRONEMIUS COMPRESS: NORMAL
BH CV LOWER VASCULAR RIGHT GREATER SAPH AK COMPETENT: NORMAL
BH CV LOWER VASCULAR RIGHT GREATER SAPH BK COMPETENT: NORMAL
BH CV LOWER VASCULAR RIGHT GREATER SAPH BK COMPRESS: NORMAL
BH CV LOWER VASCULAR RIGHT GSV DIST THIGH COMPETENT: NORMAL
BH CV LOWER VASCULAR RIGHT GSV MID CALF COMPETENT: NORMAL
BH CV LOWER VASCULAR RIGHT GSV MID THIGH COMPETENT: NORMAL
BH CV LOWER VASCULAR RIGHT LESSER SAPH COMPETENT: NORMAL
BH CV LOWER VASCULAR RIGHT LESSER SAPH COMPRESS: NORMAL
BH CV LOWER VASCULAR RIGHT MID FEMORAL AUGMENT: NORMAL
BH CV LOWER VASCULAR RIGHT MID FEMORAL COMPETENT: NORMAL
BH CV LOWER VASCULAR RIGHT MID FEMORAL COMPRESS: NORMAL
BH CV LOWER VASCULAR RIGHT MID FEMORAL PHASIC: NORMAL
BH CV LOWER VASCULAR RIGHT MID FEMORAL SPONT: NORMAL
BH CV LOWER VASCULAR RIGHT PERONEAL COMPRESS: NORMAL
BH CV LOWER VASCULAR RIGHT POPLITEAL AUGMENT: NORMAL
BH CV LOWER VASCULAR RIGHT POPLITEAL COMPETENT: NORMAL
BH CV LOWER VASCULAR RIGHT POPLITEAL COMPRESS: NORMAL
BH CV LOWER VASCULAR RIGHT POPLITEAL PHASIC: NORMAL
BH CV LOWER VASCULAR RIGHT POPLITEAL SPONT: NORMAL
BH CV LOWER VASCULAR RIGHT POSTERIOR TIBIAL COMPRESS: NORMAL
BH CV LOWER VASCULAR RIGHT PROFUNDA FEMORAL AUGMENT: NORMAL
BH CV LOWER VASCULAR RIGHT PROFUNDA FEMORAL COMPETENT: NORMAL
BH CV LOWER VASCULAR RIGHT PROFUNDA FEMORAL COMPRESS: NORMAL
BH CV LOWER VASCULAR RIGHT PROFUNDA FEMORAL PHASIC: NORMAL
BH CV LOWER VASCULAR RIGHT PROFUNDA FEMORAL SPONT: NORMAL
BH CV LOWER VASCULAR RIGHT PROXIMAL FEMORAL AUGMENT: NORMAL
BH CV LOWER VASCULAR RIGHT PROXIMAL FEMORAL COMPETENT: NORMAL
BH CV LOWER VASCULAR RIGHT PROXIMAL FEMORAL COMPRESS: NORMAL
BH CV LOWER VASCULAR RIGHT PROXIMAL FEMORAL PHASIC: NORMAL
BH CV LOWER VASCULAR RIGHT PROXIMAL FEMORAL SPONT: NORMAL
BH CV LOWER VASCULAR RIGHT SAPHENOFEMORAL JUNCTION AUGMENT: NORMAL
BH CV LOWER VASCULAR RIGHT SAPHENOFEMORAL JUNCTION COMPETENT: NORMAL
BH CV LOWER VASCULAR RIGHT SAPHENOFEMORAL JUNCTION COMPRESS: NORMAL
BH CV LOWER VASCULAR RIGHT SAPHENOFEMORAL JUNCTION PHASIC: NORMAL
BH CV LOWER VASCULAR RIGHT SAPHENOFEMORAL JUNCTION SPONT: NORMAL
BH CV LOWER VASCULAR RIGHT SAPHENOPOP JX AUGMENT: NORMAL
BH CV LOWER VASCULAR RIGHT SAPHENOPOP JX COMPETENT: NORMAL
BH CV LOWER VASCULAR RIGHT SAPHENOPOP JX COMPRESS: NORMAL
BH CV LOWER VASCULAR RIGHT SAPHENOPOP JX PHASIC: NORMAL
BH CV LOWER VASCULAR RIGHT SAPHENOPOP JX SPONT: NORMAL
BH CV LOWER VASCULAR RIGHT SOLEAL COMPRESS: NORMAL
BH CV LOWER VASCULAR RIGHT SSV MID CALF COMPETENT: NORMAL
BH CV LOWER VASCULAR RIGHT SSV MID CALF COMPRESS: NORMAL
BH CV LOWER VASCULAR RIGHT VARICOSITY BK COMPETENT: NORMAL
BH CV LOWER VASCULAR RIGHT VARICOSITY BK COMPRESS: NORMAL
BH CV RIGHT LOWER VAS AA GSV TRANS DIAMETER: 0.5 CM
BH CV RIGHT LOWER VAS DEEP FV REFLUX COLOR FLOW TIME: 3.6 SEC
BH CV RIGHT LOWER VAS GSV KNEE REFLUX TIME: 3.6 SEC
BH CV RIGHT LOWER VAS GSV KNEE TRANS DIAMETER: 0.5 CM
BH CV RIGHT LOWER VAS GSV MID CALF TRANS DIAMETER: 0.4 CM
BH CV RIGHT LOWER VAS GSV MID THIGH REFLUX TIME: 5.6 SEC
BH CV RIGHT LOWER VAS GSV MID THIGH TRANS DIAMETER: 0.5 CM
BH CV RIGHT LOWER VAS GSV PROX CALF REFLUX TIME: 4 SEC
BH CV RIGHT LOWER VAS GSV PROX CALF TRANS DIAMETER: 0.5 CM
BH CV RIGHT LOWER VAS GSV PROX THIGH TRANS DIAMETER: 0.6 CM
BH CV RIGHT LOWER VAS SAPHENOFEM JUNCTION TRANSVERSE DIAMETER: 0.8 CM
BH CV RIGHT LOWER VAS SPJ TRANS DIAMETER: 0.4 CM
BH CV RIGHT LOWER VAS SSV MID CALF TRANS DIAMETER: 0.4 CM
BH CV RIGHT LOWER VAS VARICOSITY BK TRANS DIAMETER: 0.4 CM
BH CV VAS RIGHT GSV PROXIMAL HIDDEN LRR COMPRESSIBILTY: NORMAL
MAXIMAL PREDICTED HEART RATE: 160 BPM
STRESS TARGET HR: 136 BPM

## 2023-03-17 PROCEDURE — 93971 EXTREMITY STUDY: CPT

## 2023-03-17 PROCEDURE — 93971 EXTREMITY STUDY: CPT | Performed by: INTERNAL MEDICINE

## 2023-03-24 ENCOUNTER — OUTSIDE FACILITY SERVICE (OUTPATIENT)
Dept: ORTHOPEDIC SURGERY | Facility: CLINIC | Age: 63
End: 2023-03-24
Payer: COMMERCIAL

## 2023-03-24 ENCOUNTER — DOCUMENTATION (OUTPATIENT)
Dept: ORTHOPEDIC SURGERY | Facility: CLINIC | Age: 63
End: 2023-03-24

## 2023-03-24 PROCEDURE — 77002 NEEDLE LOCALIZATION BY XRAY: CPT | Performed by: ORTHOPAEDIC SURGERY

## 2023-03-24 PROCEDURE — 20610 DRAIN/INJ JOINT/BURSA W/O US: CPT | Performed by: ORTHOPAEDIC SURGERY

## 2023-03-24 NOTE — PROGRESS NOTES
[facility]    Operative Report    [facility]  [address], [address]  [place] [address]    DATE OF PROCEDURE: 03/24/23    PREOPERATIVE DIAGNOSIS: right hip arthritis    POSTOPERATIVE DIAGNOSIS:  right hip arthritis    PROCEDURES PERFORMED:   right hip injection under fluoroscopic guidance    SURGEON: Shaq High MD    SPECIMENS: None    ESTIMATED BLOOD LOSS: None    COMPLICATIONS: None    INDICATIONS: The patient is a 62 y.o. male with right hip pain secondary to osteoarthritis that failed to improve in spite of conservative treatment .  Options have been discussed at length with the patient, and patient has reached the point where the patient desires to proceed with an intra-articular hip injection understanding the risks, benefits, and alternatives. Consent was obtained. Please see my office notes for details with regard to preprocedure counseling and rationale.     DESCRIPTION OF PROCEDURE: The patient was positively identified in the preoperative holding area and brought to the operating suite and placed in a supine position.  Timeout procedure was performed to confirm the injection site, as well as other parameters. Following the sterile prep and drape, a 20-gauge spinal needle was placed into the hip joint, and confirmed to be in an intra-articular location with radiographic dye, and subsequently infiltrated with 40 mg of Kenalog mixed with ropivacaine.    The patient tolerated the procedure well and was brought to the recovery room in good condition.  The patient noted 90% improvement after walking from the operating room to the recovery room.    PLAN:  1.  The patient will keep a written or mental diary of how well the injection works and for how long.  2.  Follow up in 4 weeks as planned in the office.    Future Appointments   Date Time Provider Department Center   3/24/2023  8:40 AM Shaq High MD [facility]    4/24/2023  8:10 AM Shaq High MD [facility]   10/25/2023  8:30 AM Alton Lomas, BOBBY, APRN [facility]   11/1/2023  9:45 AM Suzan Ellis MD [facility]       Shaq High MD  03/24/23  08:00 EDT

## 2023-04-24 ENCOUNTER — OFFICE VISIT (OUTPATIENT)
Dept: ORTHOPEDIC SURGERY | Facility: CLINIC | Age: 63
End: 2023-04-24
Payer: COMMERCIAL

## 2023-04-24 VITALS
WEIGHT: 313 LBS | HEIGHT: 71 IN | DIASTOLIC BLOOD PRESSURE: 92 MMHG | SYSTOLIC BLOOD PRESSURE: 168 MMHG | BODY MASS INDEX: 43.82 KG/M2

## 2023-04-24 DIAGNOSIS — M16.11 PRIMARY OSTEOARTHRITIS OF RIGHT HIP: Primary | ICD-10-CM

## 2023-04-24 DIAGNOSIS — E66.01 OBESITY, MORBID, BMI 40.0-49.9: ICD-10-CM

## 2023-04-24 RX ORDER — DULAGLUTIDE 3 MG/.5ML
INJECTION, SOLUTION SUBCUTANEOUS
COMMUNITY
Start: 2023-04-18

## 2023-04-24 RX ORDER — DULAGLUTIDE 0.75 MG/.5ML
INJECTION, SOLUTION SUBCUTANEOUS
COMMUNITY
Start: 2023-02-24

## 2023-04-24 RX ORDER — TRIAMTERENE AND HYDROCHLOROTHIAZIDE 37.5; 25 MG/1; MG/1
TABLET ORAL
COMMUNITY
Start: 2023-03-06

## 2023-04-24 RX ORDER — DULAGLUTIDE 1.5 MG/.5ML
INJECTION, SOLUTION SUBCUTANEOUS
COMMUNITY
Start: 2023-03-22

## 2023-04-24 RX ORDER — BLOOD SUGAR DIAGNOSTIC
STRIP MISCELLANEOUS
COMMUNITY
Start: 2023-03-22

## 2023-04-24 NOTE — PROGRESS NOTES
[facility] [place] [facility]    Subjective     Chief Complaint   Patient presents with   • Follow-up     4 week f/u right hip injection under fluoroscopic guidance        HPI    It has been 4  week(s) since Mr. Guzmán's last visit. He returns to clinic today for follow-up of right hip injection. The issue has been ongoing for 2 year(s). He rates his pain a 7/10 on the pain scale. Previous/current treatments: NSAIDS. Current symptoms: same as prior visit. The pain is worse with walking, standing, sleeping, working and leisure; resting, heat and pain medication and/or NSAID improve the pain. Overall, he is doing better.  Injection helped for 2 weeks, but the pain is starting to come back.  He continues to work on weight loss.    I have reviewed the following portions of the patient's history and agree with: History of Present Illness and Review of Systems    Patient Active Problem List   Diagnosis   • Acute bronchitis   • Obstructive apnea   • Essential hypertension   • Bilateral lower extremity edema.   • Abnormal EKG   • Obesity   • History of colon cancer   • Prostate cancer   • Diabetes   • Status post prostatectomy, lap robotic   • Adenomatous duodenal polyp   • Eczema   • Encounter for drug therapy   • History of adenocarcinoma of prostate   • Primary osteoarthritis of right hip   • Raised prostate specific antigen   • Type 2 diabetes mellitus with hyperglycemia, without long-term current use of insulin   • Hypertension   • Morbid obesity with body mass index (BMI) of 45.0 to 49.9 in adult   • Obesity   • Obstructive sleep apnea   • Malignant tumor of prostate     Past Medical History:   Diagnosis Date   • Abnormal EKG    • Bilateral lower extremity edema.    • Colon cancer    • Colon cancer    • CPAP (continuous positive airway pressure) dependence    • Diabetes mellitus    • Enlarged prostate    • Hip arthrosis Rd   • History of chemotherapy     x6 months.   • History of radiation therapy  06/18/2020    prostate bed s/p prostatectomy   • Hypertension    • Obesity    • Prostate cancer    • Sleep apnea    • Wears glasses       Past Surgical History:   Procedure Laterality Date   • COLECTOMY PARTIAL / TOTAL     • COLONOSCOPY  2017   • PROSTATE SURGERY  01/10/2020   • PROSTATECTOMY N/A 1/10/2020    Procedure: PROSTATECTOMY LAPAROSCOPIC WITH DAVINCI ROBOT;  Surgeon: Andrés Dietz MD;  [facility]: [facility];  Service: DaVinci   • TUMOR REMOVAL Right 2016    Shoulder- Benign   • VASECTOMY        Family History   Problem Relation Age of Onset   • Hyperlipidemia Other    • Heart disease Mother    • Diabetes Father    • Stroke Father    • Cancer Father    • Heart disease Father    • Hypertension Father    • Diabetes Sister    • Heart attack Sister    • Diabetes Sister    • Diabetes Sister    • Diabetes Brother      Social History     Socioeconomic History   • Marital status:    Tobacco Use   • Smoking status: Never   • Smokeless tobacco: Never   Vaping Use   • Vaping Use: Never used   Substance and Sexual Activity   • Alcohol use: Yes     Alcohol/week: 3.0 standard drinks     Types: 2 Cans of beer, 1 Drinks containing 0.5 oz of alcohol per week   • Drug use: No   • Sexual activity: Yes     Partners: Female     Birth control/protection: None      Current Outpatient Medications on File Prior to Visit   Medication Sig Dispense Refill   • bumetanide (BUMEX) 0.5 MG tablet Take 1 tablet by mouth Daily. 90 tablet 1   • cholecalciferol (VITAMIN D3) 1.25 MG (62442 UT) capsule Take 1 capsule by mouth Every 7 (Seven) Days.     • Continuous Blood Gluc Sensor (FreeStyle Hebert 14 Day Sensor) misc Apply 1 Device topically to the appropriate area as directed Continuous.     • famotidine (PEPCID) 20 MG tablet Take  by mouth As Needed.     • ibuprofen (ADVIL,MOTRIN) 800 MG tablet if needed.     • meloxicam (MOBIC) 15 MG tablet Daily.     • Multiple Vitamins-Minerals (CENTRUM SILVER 50+MEN) tablet Take 1 tablet by  mouth Daily.     • OneTouch Verio test strip      • rosuvastatin (CRESTOR) 5 MG tablet TAKE 1 TABLET DAILY 90 tablet 2   • triamterene-hydrochlorothiazide (MAXZIDE) 75-50 MG per tablet TAKE 1 TABLET DAILY 90 tablet 3   • triamterene-hydrochlorothiazide (MAXZIDE-25) 37.5-25 MG per tablet      • Trulicity 1.5 MG/0.5ML solution pen-injector      • valACYclovir (VALTREX) 1000 MG tablet Daily.     • Trulicity 0.75 MG/0.5ML solution pen-injector  (Patient not taking: Reported on 4/24/2023)     • Trulicity 3 MG/0.5ML solution pen-injector  (Patient not taking: Reported on 4/24/2023)     • [DISCONTINUED] losartan (COZAAR) 100 MG tablet TAKE 1 TABLET DAILY (Patient taking differently: Take 100 mg by mouth Every Morning.) 90 tablet 3   • [DISCONTINUED] losartan (COZAAR) 100 MG tablet Daily.     • [DISCONTINUED] meloxicam (MOBIC) 15 MG tablet Take 1 tablet by mouth Daily.     • [DISCONTINUED] Mounjaro 2.5 MG/0.5ML solution pen-injector 1 (One) Time Per Week.       No current facility-administered medications on file prior to visit.      Allergies   Allergen Reactions   • Lisinopril Cough        Review of Systems   Constitutional: Negative for activity change, appetite change, chills, diaphoresis, fatigue, fever and unexpected weight change.   HENT: Negative for congestion, dental problem, drooling, ear discharge, ear pain, facial swelling, hearing loss, mouth sores, nosebleeds, postnasal drip, rhinorrhea, sinus pressure, sneezing, sore throat, tinnitus, trouble swallowing and voice change.    Eyes: Negative for photophobia, pain, discharge, redness, itching and visual disturbance.   Respiratory: Negative for apnea, cough, choking, chest tightness, shortness of breath, wheezing and stridor.    Cardiovascular: Negative for chest pain, palpitations and leg swelling.   Gastrointestinal: Negative for abdominal distention, abdominal pain, anal bleeding, blood in stool, constipation, diarrhea, nausea, rectal pain and vomiting.  "  Endocrine: Negative for cold intolerance, heat intolerance, polydipsia, polyphagia and polyuria.   Genitourinary: Negative for decreased urine volume, difficulty urinating, dysuria, enuresis, flank pain, frequency, genital sores, hematuria and urgency.   Musculoskeletal: Positive for arthralgias. Negative for back pain, gait problem, joint swelling, myalgias, neck pain and neck stiffness.   Skin: Negative for color change, pallor, rash and wound.   Allergic/Immunologic: Negative for environmental allergies, food allergies and immunocompromised state.   Neurological: Negative for dizziness, tremors, seizures, syncope, facial asymmetry, speech difficulty, weakness, light-headedness, numbness and headaches.   Hematological: Negative for adenopathy. Does not bruise/bleed easily.   Psychiatric/Behavioral: Negative for agitation, behavioral problems, confusion, decreased concentration, dysphoric mood, hallucinations, self-injury, sleep disturbance and suicidal ideas. The patient is not nervous/anxious and is not hyperactive.         Objective      Physical Exam  /92   Ht 180 cm (70.87\")   Wt (!) 142 kg (313 lb)   BMI 43.82 kg/m²     Body mass index is 43.82 kg/m².    General:   Mental Status:  Alert   Appearance: Cooperative, in no acute distress   Build and Nutrition: Obese by BMI male   Orientation: Alert and oriented to person, place and time   Posture: Normal   Gait: Limp on the right    Lower Extremities:              Right Hip:                          Swelling:          None                          Crepitus:          None                          Atrophy:           None                          Range of motion:        External Rotation:       20°                                                              Internal " Rotation:        10°                                                              Flexion:                       90°                                                              Extension:                   0°           Instability:        None  Deformities:     None  Functional testing: Negative [facility]  No leg length discrepancy    Imaging/Studies  Imaging Results (Last 24 Hours)     ** No results found for the last 24 hours. **        No new imaging today.    Assessment and Plan     Diagnoses and all orders for this visit:    1. Primary osteoarthritis of right hip (Primary)    2. Obesity, morbid, BMI 40.0-49.9        1. Primary osteoarthritis of right hip    2. Obesity, morbid, BMI 40.0-49.9        I reviewed my findings with the patient.  Injection helped for about 2 weeks.  We discussed options going forward, and he is a candidate for hip replacement surgery and is going to continue to work on weight loss with a goal of a body mass index less than 40.  He would like to see me back in 2 months, and we will see where we are from a weight loss standpoint.  We will see him back sooner for any problems.    Return in about 2 months (around 6/24/2023).      Shaq High MD  04/24/23  08:29 EDT

## 2023-07-27 ENCOUNTER — TELEPHONE (OUTPATIENT)
Dept: ORTHOPEDIC SURGERY | Facility: CLINIC | Age: 63
End: 2023-07-27
Payer: COMMERCIAL

## 2023-07-29 ENCOUNTER — HOSPITAL ENCOUNTER (OUTPATIENT)
Dept: MRI IMAGING | Facility: HOSPITAL | Age: 63
Discharge: HOME | End: 2023-07-29
Admitting: ORTHOPAEDIC SURGERY
Payer: COMMERCIAL

## 2023-07-29 DIAGNOSIS — M16.11 PRIMARY OSTEOARTHRITIS OF RIGHT HIP: ICD-10-CM

## 2023-07-29 PROCEDURE — 73721 MRI JNT OF LWR EXTRE W/O DYE: CPT

## 2023-08-09 ENCOUNTER — OFFICE VISIT (OUTPATIENT)
Dept: ORTHOPEDIC SURGERY | Facility: CLINIC | Age: 63
End: 2023-08-09
Payer: COMMERCIAL

## 2023-08-09 VITALS
WEIGHT: 305 LBS | SYSTOLIC BLOOD PRESSURE: 126 MMHG | HEIGHT: 69 IN | DIASTOLIC BLOOD PRESSURE: 76 MMHG | BODY MASS INDEX: 45.18 KG/M2

## 2023-08-09 DIAGNOSIS — M16.11 PRIMARY OSTEOARTHRITIS OF RIGHT HIP: Primary | ICD-10-CM

## 2023-08-09 DIAGNOSIS — E66.01 OBESITY, MORBID, BMI 40.0-49.9: ICD-10-CM

## 2023-08-09 NOTE — PROGRESS NOTES
[facility]    Subjective     Chief Complaint   Patient presents with    Follow-up     6w f/u right hip; MRI completed 7/29/23        HPI    It has been 6  week(s) since Mr. Guzmán's last visit. He returns to clinic today for follow-up of right hip pain. The issue has been ongoing for 1 year(s). He rates his pain a 7/10 on the pain scale. Previous/current treatments: NSAIDS and steroid injection (last injection 3/24/23). Current symptoms: pain and stiffness. The pain is worse with walking, sleeping, working, and lying on affected side; resting, sitting, and pain medication and/or NSAID improve the pain. Overall, he is doing the same.  Here today for the MRI results.  He did also get a second opinion in the meantime at [facility] with Dr. Camejo, who agreed with total hip arthroplasty.    I have reviewed the following portions of the patient's history and agree with: History of Present Illness and Review of Systems    Patient Active Problem List   Diagnosis    Acute bronchitis    Obstructive apnea    Essential hypertension    Bilateral lower extremity edema.    Abnormal EKG    Obesity    History of colon cancer    Prostate cancer    Diabetes    Status post prostatectomy, lap robotic    Adenomatous duodenal polyp    Eczema    Encounter for drug therapy    History of adenocarcinoma of prostate    Primary osteoarthritis of right hip    Raised prostate specific antigen    Type 2 diabetes mellitus with hyperglycemia, without long-term current use of insulin    Hypertension    Morbid obesity with body mass index (BMI) of 45.0 to 49.9 in adult    Obesity    Obstructive sleep apnea    Malignant tumor of prostate     Past Medical History:   Diagnosis Date    Abnormal EKG     Bilateral lower extremity edema.     Colon cancer     Colon cancer     CPAP (continuous positive airway pressure) dependence     Diabetes mellitus     Enlarged prostate     Hip arthrosis Rd    History of  chemotherapy     x6 months.    History of radiation therapy 06/18/2020    prostate bed s/p prostatectomy    Hypertension     Obesity     Prostate cancer     Sleep apnea     Wears glasses       Past Surgical History:   Procedure Laterality Date    COLECTOMY PARTIAL / TOTAL      COLONOSCOPY  2017    PROSTATE SURGERY  01/10/2020    PROSTATECTOMY N/A 1/10/2020    Procedure: PROSTATECTOMY LAPAROSCOPIC WITH DAVINCI ROBOT;  Surgeon: Andrés Dietz MD;  Location: [facility];  Service: DaVinci    TUMOR REMOVAL Right 2016    Shoulder- Benign    VASECTOMY        Family History   Problem Relation Age of Onset    Hyperlipidemia Other     Heart disease Mother     Diabetes Father     Stroke Father     Cancer Father     Heart disease Father     Hypertension Father     Diabetes Sister     Heart attack Sister     Diabetes Sister     Diabetes Sister     Diabetes Brother      Social History     Socioeconomic History    Marital status:    Tobacco Use    Smoking status: Never    Smokeless tobacco: Never   Vaping Use    Vaping Use: Never used   Substance and Sexual Activity    Alcohol use: Yes     Alcohol/week: 3.0 standard drinks     Types: 2 Cans of beer, 1 Drinks containing 0.5 oz of alcohol per week    Drug use: No    Sexual activity: Yes     Partners: Female     Birth control/protection: None      Current Outpatient Medications on File Prior to Visit   Medication Sig Dispense Refill    bumetanide (BUMEX) 0.5 MG tablet TAKE 1 TABLET DAILY 90 tablet 3    cholecalciferol (VITAMIN D3) 1.25 MG (24327 UT) capsule Take 1 capsule by mouth Every 7 (Seven) Days.      Continuous Blood Gluc Sensor (FreeStyle Hebert 14 Day Sensor) misc Apply 1 Device topically to the appropriate area as directed Continuous.      famotidine (PEPCID) 20 MG tablet Take  by mouth As Needed.      ibuprofen (ADVIL,MOTRIN) 800 MG tablet if needed.      losartan (COZAAR) 100 MG tablet       meloxicam (MOBIC) 15 MG tablet Daily.      Multiple Vitamins-Minerals  (CENTRUM SILVER 50+MEN) tablet Take 1 tablet by mouth Daily.      OneTouch Verio test strip       rosuvastatin (CRESTOR) 5 MG tablet TAKE 1 TABLET DAILY 90 tablet 2    triamterene-hydrochlorothiazide (MAXZIDE) 75-50 MG per tablet TAKE 1 TABLET DAILY 90 tablet 3    triamterene-hydrochlorothiazide (MAXZIDE-25) 37.5-25 MG per tablet       Trulicity 3 MG/0.5ML solution pen-injector       valACYclovir (VALTREX) 1000 MG tablet Daily.      [DISCONTINUED] Trulicity 0.75 MG/0.5ML solution pen-injector       [DISCONTINUED] Trulicity 1.5 MG/0.5ML solution pen-injector        No current facility-administered medications on file prior to visit.      Allergies   Allergen Reactions    Lisinopril Cough        Review of Systems   Constitutional:  Negative for activity change, appetite change, chills, diaphoresis, fatigue, fever and unexpected weight change.   HENT:  Negative for congestion, dental problem, drooling, ear discharge, ear pain, facial swelling, hearing loss, mouth sores, nosebleeds, postnasal drip, rhinorrhea, sinus pressure, sneezing, sore throat, tinnitus, trouble swallowing and voice change.    Eyes:  Negative for photophobia, pain, discharge, redness, itching and visual disturbance.   Respiratory:  Negative for apnea, cough, choking, chest tightness, shortness of breath, wheezing and stridor.    Cardiovascular:  Negative for chest pain, palpitations and leg swelling.   Gastrointestinal:  Negative for abdominal distention, abdominal pain, anal bleeding, blood in stool, constipation, diarrhea, nausea, rectal pain and vomiting.   Endocrine: Negative for cold intolerance, heat intolerance, polydipsia, polyphagia and polyuria.   Genitourinary:  Negative for decreased urine volume, difficulty urinating, dysuria, enuresis, flank pain, frequency, genital sores, hematuria and urgency.   Musculoskeletal:  Positive for arthralgias. Negative for back pain, gait problem, joint swelling, myalgias, neck pain and neck stiffness.  "  Skin:  Negative for color change, pallor, rash and wound.   Allergic/Immunologic: Negative for environmental allergies, food allergies and immunocompromised state.   Neurological:  Negative for dizziness, tremors, seizures, syncope, facial asymmetry, speech difficulty, weakness, light-headedness, numbness and headaches.   Hematological:  Negative for adenopathy. Does not bruise/bleed easily.   Psychiatric/Behavioral:  Negative for agitation, behavioral problems, confusion, decreased concentration, dysphoric mood, hallucinations, self-injury, sleep disturbance and suicidal ideas. The patient is not nervous/anxious and is not hyperactive.       Objective      Physical Exam  /76   Ht 175.3 cm (69\")   Wt (!) 138 kg (305 lb)   BMI 45.04 kg/mý     Body mass index is 45.04 kg/mý.    General:   Mental Status:  Alert   Appearance: Cooperative, in no acute distress   Build and Nutrition: Obese by BMI male   Orientation: Alert and oriented to person, place and time   Posture: Normal   Gait: Limp on the right    Lower Extremities:              Right Hip:                          Swelling:          None                          Crepitus:          None                          Atrophy:           None                          Range of motion:        External Rotation:       20ø                                                              Internal Rotation:        10ø with pain                                                              Flexion:                       90ø                                                              Extension:                   0ø           Instability:        None  Deformities:     None  [facility]  No leg length discrepancy    Imaging/Studies  Imaging Results (Last 24 Hours)       ** No results found for the last 24 hours. **          MRI HIP RIGHT WO CONTRAST     Date of Exam: 7/29/2023 10:25 AM EDT     Indication: Hip pain, chronic, articular cartilage " eval, xray done  history of prostate and colon cancer.     Comparison: None available.     Technique:  Routine multiplanar/multisequence sequence images of the right hip were obtained without contrast administration.          Findings:  Whole pelvis: No evidence of acute fracture. No suspicious osseous lesions. Status post prostatectomy. Vascular flow voids appear intact. Sciatic nerves appear symmetric in size and signal. Degenerative changes of the lower lumbar spine with perifacet   edema at L4-5. Mild degenerative changes of the left hip with subchondral cystic change of the superior acetabulum and superior chondral thinning.     Right hip: Severe degenerative changes of the right hip with complete loss of superior articular cartilage and extensive subchondral cystic change and edema of the superior femoral head and superior acetabulum. There is osteophytosis of the right femoral   head as well. No definite subchondral fracture seen. Small hip joint effusion. Ligamentum teres appears intact. Extensive degeneration of the labrum.     Muscles and tendons: Tendinopathy and low-grade partial tear of the proximal left hamstring. Right hamstring appears intact. Iliopsoas, rectus femoris, adductor, gluteal and rectus abdominis muscles and tendons appear intact. Ischiofemoral intervals   appear intact.     IMPRESSION:  Impression:  Severe degenerative changes of the right hip with associated small effusion.         Electronically Signed: Nilesh Escobar    7/31/2023 4:52 PM EDT    Workstation ID: [id number]    I reviewed the above imaging, and agree with findings.    Assessment and Plan     Diagnoses and all orders for this visit:    1. Primary osteoarthritis of right hip (Primary)    2. Obesity, morbid, BMI 40.0-49.9        1. Primary osteoarthritis of right hip    2. Obesity, morbid, BMI 40.0-49.9        Reviewed my findings with the patient.  MRI does show advanced arthritis of the hip with no concerns of metastatic  disease (history of prostate cancer), and he is going to continue to work on weight loss.  Goal is body mass index less than 40.  Second opinion agreed with total hip arthroplasty, but no body mass index issues proceeding with surgery from their perspective.  I will see him back in 2 to 3 months, as he has a goal to proceed with surgery early next year, but I will be happy to see him back sooner for any problems.    Return in about 2 months (around 10/9/2023).      Shaq High MD  08/09/23  09:15 EDT

## 2023-10-09 ENCOUNTER — OFFICE VISIT (OUTPATIENT)
Dept: ORTHOPEDIC SURGERY | Facility: CLINIC | Age: 63
End: 2023-10-09
Payer: COMMERCIAL

## 2023-10-09 VITALS
SYSTOLIC BLOOD PRESSURE: 120 MMHG | BODY MASS INDEX: 44.14 KG/M2 | WEIGHT: 298 LBS | DIASTOLIC BLOOD PRESSURE: 84 MMHG | HEIGHT: 69 IN

## 2023-10-09 DIAGNOSIS — M16.11 PRIMARY OSTEOARTHRITIS OF RIGHT HIP: Primary | ICD-10-CM

## 2023-10-09 DIAGNOSIS — E66.01 OBESITY, MORBID, BMI 40.0-49.9: ICD-10-CM

## 2023-10-09 PROBLEM — M16.9 DEGENERATIVE ARTHRITIS OF HIP: Status: ACTIVE | Noted: 2023-10-09

## 2023-10-09 PROCEDURE — 99214 OFFICE O/P EST MOD 30 MIN: CPT | Performed by: ORTHOPAEDIC SURGERY

## 2023-10-09 RX ORDER — ACETAMINOPHEN 325 MG/1
1000 TABLET ORAL ONCE
OUTPATIENT
Start: 2023-10-09 | End: 2023-10-09

## 2023-10-09 RX ORDER — MELOXICAM 7.5 MG/1
15 TABLET ORAL ONCE
OUTPATIENT
Start: 2023-10-09 | End: 2023-10-09

## 2023-10-09 RX ORDER — PREGABALIN 150 MG/1
150 CAPSULE ORAL ONCE
OUTPATIENT
Start: 2023-10-09 | End: 2023-10-09

## 2023-10-09 NOTE — PROGRESS NOTES
[facility]    Subjective     Chief Complaint   Patient presents with    Follow-up     3  month f/u - Primary osteoarthritis of right hip        HPI    It has been 3  month(s) since Mr. Guzmán's last visit. He returns to clinic today for follow-up of right hip pain. The issue has been ongoing for 1 year(s). He rates his pain a 8/10 on the pain scale. Previous/current treatments: NSAIDS and weight loss. Current symptoms: stiffness and giving way/buckling. The pain is worse with walking, standing, climbing stairs, sleeping, working, lying on affected side, rising from seated position, and any movement of the joint; ice and heat improve the pain. Overall, he is doing better.  He has reached a point where he would like to proceed with hip replacement surgery.  He did get a second opinion with Dr. Reza who agreed with hip replacement surgery.  No history of clots or clotting disorders.  No blood thinners.  His wife and sister can help out postoperatively.  He is a diabetic, with hemoglobin A1c most recently of 6.1.  No heart disease.    I have reviewed the following portions of the patient's history and agree with: History of Present Illness and Review of Systems    Patient Active Problem List   Diagnosis    Acute bronchitis    Obstructive apnea    Essential hypertension    Bilateral lower extremity edema.    Abnormal EKG    Obesity    History of colon cancer    Prostate cancer    Diabetes    Status post prostatectomy, lap robotic    Adenomatous duodenal polyp    Eczema    Encounter for drug therapy    History of adenocarcinoma of prostate    Primary osteoarthritis of right hip    Raised prostate specific antigen    Type 2 diabetes mellitus with hyperglycemia, without long-term current use of insulin    Hypertension    Morbid obesity with body mass index (BMI) of 45.0 to 49.9 in adult    Obesity    Obstructive sleep apnea    Malignant tumor of prostate    Degenerative arthritis of hip      Past Medical History:   Diagnosis Date    Abnormal EKG     Bilateral lower extremity edema.     Colon cancer     Colon cancer     CPAP (continuous positive airway pressure) dependence     Diabetes mellitus     Enlarged prostate     Hip arthrosis Rd    History of chemotherapy     x6 months.    History of radiation therapy 06/18/2020    prostate bed s/p prostatectomy    Hypertension     Obesity     Prostate cancer     Sleep apnea     Wears glasses       Past Surgical History:   Procedure Laterality Date    COLECTOMY PARTIAL / TOTAL      COLONOSCOPY  2017    PROSTATE SURGERY  01/10/2020    PROSTATECTOMY N/A 1/10/2020    Procedure: PROSTATECTOMY LAPAROSCOPIC WITH DAVINCI ROBOT;  Surgeon: Andrés Dietz MD;  Location: [facility];  Service: DaVinci    TUMOR REMOVAL Right 2016    Shoulder- Benign    VASECTOMY        Family History   Problem Relation Age of Onset    Hyperlipidemia Other     Heart disease Mother     Diabetes Father     Stroke Father     Cancer Father     Heart disease Father     Hypertension Father     Diabetes Sister     Heart attack Sister     Diabetes Sister     Diabetes Sister     Diabetes Brother      Social History     Socioeconomic History    Marital status:    Tobacco Use    Smoking status: Never    Smokeless tobacco: Never   Vaping Use    Vaping Use: Never used   Substance and Sexual Activity    Alcohol use: Yes     Alcohol/week: 3.0 standard drinks of alcohol     Types: 2 Cans of beer, 1 Drinks containing 0.5 oz of alcohol per week    Drug use: No    Sexual activity: Yes     Partners: Female     Birth control/protection: None      Current Outpatient Medications on File Prior to Visit   Medication Sig Dispense Refill    bumetanide (BUMEX) 0.5 MG tablet TAKE 1 TABLET DAILY 90 tablet 3    cholecalciferol (VITAMIN D3) 1.25 MG (77779 UT) capsule Take 1 capsule by mouth Every 7 (Seven) Days.      Continuous Blood Gluc Sensor (FreeStyle Hebert 14 Day Sensor) misc Apply 1 Device topically  "to the appropriate area as directed Continuous.      famotidine (PEPCID) 20 MG tablet Take  by mouth As Needed.      ibuprofen (ADVIL,MOTRIN) 800 MG tablet if needed.      losartan (COZAAR) 100 MG tablet       meloxicam (MOBIC) 15 MG tablet Daily.      Multiple Vitamins-Minerals (CENTRUM SILVER 50+MEN) tablet Take 1 tablet by mouth Daily.      OneTouch Verio test strip       rosuvastatin (CRESTOR) 5 MG tablet TAKE 1 TABLET DAILY 90 tablet 2    triamterene-hydrochlorothiazide (MAXZIDE) 75-50 MG per tablet TAKE 1 TABLET DAILY 90 tablet 3    triamterene-hydrochlorothiazide (MAXZIDE-25) 37.5-25 MG per tablet       Trulicity 3 MG/0.5ML solution pen-injector       valACYclovir (VALTREX) 1000 MG tablet Daily.       No current facility-administered medications on file prior to visit.      Allergies   Allergen Reactions    Lisinopril Cough        Review of Systems   Constitutional: Negative.    HENT: Negative.     Eyes: Negative.    Respiratory: Negative.     Cardiovascular: Negative.    Gastrointestinal: Negative.    Endocrine: Negative.    Genitourinary: Negative.    Musculoskeletal:  Positive for arthralgias.   Skin: Negative.    Allergic/Immunologic: Negative.    Neurological: Negative.    Hematological: Negative.    Psychiatric/Behavioral: Negative.          Objective      Physical Exam  /84   Ht 175.3 cm (69.02\")   Wt 135 kg (298 lb)   BMI 43.99 kg/mý     Body mass index is 43.99 kg/mý.    General:   Mental Status:  Alert   Appearance: Cooperative, in no acute distress   Build and Nutrition: Obese by BMI male   Orientation: Alert and oriented to person, place and time   Posture: Normal   Gait: Nonantalgic    Lower Extremities:              Right Hip:                          Swelling:          None                          Crepitus:          None                          Atrophy:           None                          Range of motion:        External Rotation:       20ø                                         "                      Internal Rotation:        10ø with pain                                                              Flexion:                       90ø                                                              Extension:                   0ø           Instability:        None  Deformities:     None  Functional testing: Positive [facility]  No leg length discrepancy    Imaging/Studies  Imaging Results (Last 24 Hours)       ** No results found for the last 24 hours. **          No new imaging today.    Assessment and Plan     Diagnoses and all orders for this visit:    1. Primary osteoarthritis of right hip (Primary)  -     Case Request; Standing  -     Instructions on coughing, deep breathing, and incentive spirometry.; Future  -     CBC and Differential; Future  -     Basic metabolic panel; Future  -     Protime-INR; Future  -     APTT; Future  -     Hemoglobin A1c; Future  -     Sedimentation rate; Future  -     C-reactive protein; Future  -     Tranexamic Acid 1,000 mg in sodium chloride 0.9 % 100 mL  -     Tranexamic Acid 1,000 mg in sodium chloride 0.9 % 100 mL  -     ethyl alcohol 62 % 2 each  -     ceFAZolin (ANCEF) 3 g in sodium chloride 0.9 % 100 mL IVPB  -     acetaminophen (TYLENOL) tablet 975 mg  -     meloxicam (MOBIC) tablet 15 mg  -     pregabalin (LYRICA) capsule 150 mg  -     Case Request    2. Obesity, morbid, BMI 40.0-49.9    Other orders  -     Outpatient In A Bed; Standing  -     Follow Anesthesia Guidelines / Protocol; Future  -     Follow Anesthesia Guidelines / Protocol; Standing  -     Verify NPO Status; Standing  -     Verify The Time Patient Completed ERAS Hydration Drink; Standing  -     SCD (sequential compression device)- to be placed on patient in Pre-op; Standing  -     Clip operative site; Standing  -     Obtain informed consent (if not collected inpatient or PAT); Standing  -     Obtain informed consent  -     Provide instructions to patient regarding NPO status  -      Chlorhexidine Skin Prep - Educate and Review With Patient; Future  -     Provide Patient With ERAS Hydration Instructions  -     Provide Patient With Enhanced Recovery Booklet(s) or Handout  -     Provide Instructions/Handout For Benzoyl Peroxide 5% Wash If Having Shoulder/Arm Surgery (If Prescribed)  -     Provide Instructions/Handout For Bactroban And Chlorhexidine Shower (If Prescribed)  -     Perform A Memory Screening On All Hip/Knee Replacement Patients >Or Equal To 65 Years Or Older  -     Complete A PROMIS And HOOS Or KOOS Survey If Having Hip Or Knee Replacement  -     Notify Physician - Standard; Standing  -     Provide Patient With Carbo Loading Instructions  -     Provide Patient With ERAS Booklet(s)/Handout  -     chlorhexidine (HIBICLENS) 4 % external liquid; Apply  topically to the appropriate area as directed Daily. Shower with hibiclens solution as directed for 5 days prior to surgery  Dispense: 236 mL; Refill: 0        1. Primary osteoarthritis of right hip    2. Obesity, morbid, BMI 40.0-49.9        I reviewed my findings with the patient.  His right hip pain continues to persist, and he continues to work on weight loss, and lost 7 pounds since his last visit.  He would like to proceed with hip replacement surgery early next year.  Risks, benefits, alternatives surgery been discussed.  He will come back in for a weight check preoperatively.  Please see my counseling note for details.    Surgical Counseling     I have informed the patient of the diagnosis and the prognosis.  Exhaustive conservative treatment modalities have not resulted in long term pain relief.  The symptoms have progressed to the point of daily pain and inability to perform activities of daily living without significant pain.  The patient has reached the point of desiring to proceed with total hip arthroplasty after discussing the risks, benefits and alternatives to the procedure.  The surgical procedure itself was discussed in  detail.  Risks of the procedure were discussed, which included but are not limited to, bleeding, infection, damage to blood vessels and nerves, incomplete pain relief, loosening of the prosthesis (early or late), deep infection (early or late), need for further surgery, leg length discrepancy, hip dislocation, loss of limb, deep venous thrombosis, pulmonary embolus, death, heart attack, stroke, kidney failure, liver failure, and anesthetic complications.  In addition, the potential for deep infection developing in the future was discussed, which could require further surgery.  The hip would have to be re-opened, debrided, and potentially remove the prosthesis, which may or may not be replaced in the future.  Also, the possibility for loosening of the prosthesis has been mentioned.  If the prosthesis loosened, a revision arthroplasty could be performed, with results that are not as predictable compared to the original procedure.  The typical rehabilitative course has also been discussed, and full recovery may take up to a year to see the maximum benefit.  The importance of patient cooperation in the rehabilitative efforts has also been discussed.  No guarantees were given.  The patient understands the potential risks versus the benefits and desires to proceed with total hip arthroplasty at a mutually convenient time.     Return for surgery.      Shaq High MD  10/09/23  08:41 EDT

## 2023-10-25 ENCOUNTER — OFFICE VISIT (OUTPATIENT)
Dept: NEUROLOGY | Facility: CLINIC | Age: 63
End: 2023-10-25
Payer: COMMERCIAL

## 2023-10-25 VITALS
WEIGHT: 300 LBS | DIASTOLIC BLOOD PRESSURE: 66 MMHG | HEART RATE: 64 BPM | SYSTOLIC BLOOD PRESSURE: 134 MMHG | HEIGHT: 69 IN | OXYGEN SATURATION: 97 % | BODY MASS INDEX: 44.43 KG/M2

## 2023-10-25 DIAGNOSIS — G47.33 OSA (OBSTRUCTIVE SLEEP APNEA): Primary | ICD-10-CM

## 2023-10-25 PROBLEM — E66.01 MORBID OBESITY WITH BODY MASS INDEX (BMI) OF 40.0 OR HIGHER: Status: ACTIVE | Noted: 2023-09-01

## 2023-10-25 PROBLEM — M25.551 PAIN OF RIGHT HIP JOINT: Status: ACTIVE | Noted: 2023-04-19

## 2023-10-25 PROBLEM — R97.20 HIGH PROSTATE SPECIFIC ANTIGEN (PSA): Status: ACTIVE | Noted: 2019-08-07

## 2023-10-25 PROBLEM — E78.5 HYPERLIPIDEMIA: Status: ACTIVE | Noted: 2023-02-28

## 2023-10-25 PROCEDURE — 99213 OFFICE O/P EST LOW 20 MIN: CPT | Performed by: NURSE PRACTITIONER

## 2023-10-25 RX ORDER — DULAGLUTIDE 4.5 MG/.5ML
4.5 INJECTION, SOLUTION SUBCUTANEOUS WEEKLY
COMMUNITY

## 2023-10-25 NOTE — LETTER
2023     Shan Villalobos MD  [address]  [facility] [address]    Patient: Tyrell Guzmán   YOB: 1960   Date of Visit: 10/25/2023     Dear Shan Villalobos MD:       Thank you for referring Tyrell Guzmán to me for evaluation. Below are the relevant portions of my assessment and plan of care.    If you have questions, please do not hesitate to call me. I look forward to following Tyrell along with you.         Sincerely,        Alton Lomas DNP, APRN        CC: No Recipients    Alton Lomas DNP, APRN  10/25/23 0915  Signed     Neuro Office Visit      Encounter Date: 10/25/2023   Patient Name: Tyrell Guzmán  : 1960   MRN: [id number]   PCP: Dr Bailey  Chief Complaint:    Chief Complaint   Patient presents with   • Daytime somnolence       History of Present Illness: Tyrell Guzmán is a 63 y.o. male who is here today in Neurology for DIPIKA.    Last visit 10/24/2022 w me-cont cpap, use chin strap. Consider referral to sleep med for provigil. Labs.    Labs: tsh, vit b12, cbc-stable    DIPIKA  No longer falling asleep at the wheel.   Now taking Super B complex.  Working  8-9 hours a day.  Compliant with mask/CPAP and receiving benefit with restorative sleep. He is alert in the morning but slow to move due to right hip OA. Scheduled for hip replacement with Dr High  Averaging 7-8 hours a night w CPAP.   12 cm H20  AHI 0.8  Using nasal pillows due to sleeping with mouth open. Now using chin strap. Feels it is beneficial. Some exercise.  Waking up once at 4 am to go to the bathroom. Trouble getting back to sleep.     Wearing compression socks     PH  DME [facility]    PMH: T2Dm last A1c 6.1. htn, hld, dipika    Subjective      Past Medical History:   Past Medical History:   Diagnosis Date   • Abnormal EKG    • Bilateral lower extremity edema.    • Colon cancer    • Colon cancer    • CPAP (continuous positive airway  pressure) dependence    • Diabetes mellitus    • Enlarged prostate    • Hip arthrosis Rd   • History of chemotherapy     x6 months.   • History of radiation therapy 06/18/2020    prostate bed s/p prostatectomy   • Hypertension    • Obesity    • Prostate cancer    • Sleep apnea    • Wears glasses        Past Surgical History:   Past Surgical History:   Procedure Laterality Date   • COLECTOMY PARTIAL / TOTAL     • COLONOSCOPY  2017   • PROSTATE SURGERY  01/10/2020   • PROSTATECTOMY N/A 1/10/2020    Procedure: PROSTATECTOMY LAPAROSCOPIC WITH DAVINCI ROBOT;  Surgeon: Andrés Dietz MD;  Location: [facility];  Service: DaVinci   • TUMOR REMOVAL Right 2016    Shoulder- Benign   • VASECTOMY         Family History:   Family History   Problem Relation Age of Onset   • Hyperlipidemia Other    • Heart disease Mother    • Diabetes Father    • Stroke Father    • Cancer Father    • Heart disease Father    • Hypertension Father    • Diabetes Sister    • Heart attack Sister    • Diabetes Sister    • Diabetes Sister    • Diabetes Brother        Social History:   Social History     Socioeconomic History   • Marital status:    Tobacco Use   • Smoking status: Never   • Smokeless tobacco: Never   Vaping Use   • Vaping Use: Never used   Substance and Sexual Activity   • Alcohol use: Yes     Alcohol/week: 3.0 standard drinks of alcohol     Types: 2 Cans of beer, 1 Drinks containing 0.5 oz of alcohol per week   • Drug use: No   • Sexual activity: Yes     Partners: Female     Birth control/protection: None       Medications:     Current Outpatient Medications:   •  bumetanide (BUMEX) 0.5 MG tablet, TAKE 1 TABLET DAILY, Disp: 90 tablet, Rfl: 3  •  Chlorhexidine Gluconate 4 % solution, Shower with hibiclens solution as directed for 5 days prior to surgery., Disp: 237 mL, Rfl: 0  •  cholecalciferol (VITAMIN D3) 1.25 MG (48221 UT) capsule, Take 1 capsule by mouth Every 7 (Seven) Days., Disp: , Rfl:   •  Continuous Blood Gluc Sensor  (AxelaCareyle Hebert 14 Day Sensor) misc, Apply 1 Device topically to the appropriate area as directed Continuous., Disp: , Rfl:   •  ibuprofen (ADVIL,MOTRIN) 800 MG tablet, if needed., Disp: , Rfl:   •  losartan (COZAAR) 100 MG tablet, , Disp: , Rfl:   •  meloxicam (MOBIC) 15 MG tablet, Daily., Disp: , Rfl:   •  Multiple Vitamins-Minerals (CENTRUM SILVER 50+MEN) tablet, Take 1 tablet by mouth Daily., Disp: , Rfl:   •  OneTouch Verio test strip, , Disp: , Rfl:   •  rosuvastatin (CRESTOR) 5 MG tablet, TAKE 1 TABLET DAILY, Disp: 90 tablet, Rfl: 2  •  triamterene-hydrochlorothiazide (MAXZIDE) 75-50 MG per tablet, TAKE 1 TABLET DAILY, Disp: 90 tablet, Rfl: 3  •  Trulicity 4.5 MG/0.5ML solution pen-injector, Inject 0.5 mL as directed 1 (One) Time Per Week., Disp: , Rfl:     Allergies:   Allergies   Allergen Reactions   • Lisinopril Cough       PHQ-9 Total Score:     STEADI Fall Risk Assessment has not been completed.    Objective     Physical Exam:   Physical Exam  Neurological:      Mental Status: He is oriented to person, place, and time.      Coordination: Romberg Test normal.      Gait: Gait is intact.      Deep Tendon Reflexes:      Reflex Scores:       Bicep reflexes are 2+ on the right side and 2+ on the left side.       Patellar reflexes are 2+ on the right side and 2+ on the left side.       Achilles reflexes are 2+ on the right side and 2+ on the left side.  Psychiatric:         Speech: Speech normal.         Neurologic Exam     Mental Status   Oriented to person, place, and time.   Follows 3 step commands.   Attention: normal. Concentration: normal.   Speech: speech is normal   Level of consciousness: alert  Knowledge: consistent with education.   Normal comprehension.     Cranial Nerves     CN III, IV, VI   Right pupil: Accommodation: intact.   Left pupil: Accommodation: intact.   CN III: no CN III palsy  CN VI: no CN VI palsy  Nystagmus: none   Diplopia: none  Upgaze: normal  Downgaze: normal  Conjugate gaze:  "present    CN VII   Facial expression full, symmetric.     CN VIII   Hearing: intact    CN XII   CN XII normal.     Motor Exam   Muscle bulk: normal  Overall muscle tone: normal    Strength   Right biceps: 5/5  Left biceps: 5/5  Right triceps: 5/5  Left triceps: 5/5  Right interossei: 5/5  Left interossei: 5/5  Right quadriceps: 5/5  Left quadriceps: 5/5  Right anterior tibial: 5/5  Left anterior tibial: 5/5  Right posterior tibial: 5/5  Left posterior tibial: 5/5    Sensory Exam   Light touch normal.     Gait, Coordination, and Reflexes     Gait  Gait: normal    Coordination   Romberg: negative    Tremor   Resting tremor: absent  Action tremor: absent    Reflexes   Right biceps: 2+  Left biceps: 2+  Right patellar: 2+  Left patellar: 2+  Right achilles: 2+  Left achilles: 2+  Right : 2+  Left : 2+       Vital Signs:   Vitals:    10/25/23 0836   BP: 134/66   Pulse: 64   SpO2: 97%   Weight: 136 kg (300 lb)   Height: 175.3 cm (69.02\")     Body mass index is 44.28 kg/m².              Assessment / Plan      Assessment/Plan:   Diagnoses and all orders for this visit:    1. DIPIKA (obstructive sleep apnea) (Primary)  Comments:  Cont  Cpap, compression hose.             Patient Education:     Reviewed medications, potential side effects and signs and symptoms to report. Discussed risk versus benefits of treatment plan with patient and/or family-including medications, labs and radiology that may be ordered. Addressed questions and concerns during visit. Patient and/or family verbalized understanding and agree with plan. Instructed to call the office with any questions and report to ER with any life-threatening symptoms.     Follow Up:   Return in about 1 year (around 10/25/2024) for Recheck.    During this visit the following were done:  Labs Reviewed [x]    Labs Ordered []    Radiology Reports Reviewed []    Radiology Ordered []    PCP Records Reviewed []    Referring Provider Records Reviewed []    ER Records " Reviewed []    Hospital Records Reviewed []    History Obtained From Family []    Radiology Images Reviewed []    Other Reviewed []    Records Requested []      Alton Lomas, DNP, APRN

## 2023-10-25 NOTE — PROGRESS NOTES
Neuro Office Visit      Encounter Date: 10/25/2023   Patient Name: Tyrell Guzmán  : 1960   MRN: [id number]   PCP: Dr Bailey  Chief Complaint:    Chief Complaint   Patient presents with    Daytime somnolence       History of Present Illness: Tyrell Guzmán is a 63 y.o. male who is here today in Neurology for DIPIKA.    Last visit 10/24/2022 w me-cont cpap, use chin strap. Consider referral to sleep med for provigil. Labs.    Labs: tsh, vit b12, cbc-stable    DIPIKA  No longer falling asleep at the wheel.   Now taking Super B complex.  Working  8-9 hours a day.  Compliant with mask/CPAP and receiving benefit with restorative sleep. He is alert in the morning but slow to move due to right hip OA. Scheduled for hip replacement with Dr High  Averaging 7-8 hours a night w CPAP.   12 cm H20  AHI 0.8  Using nasal pillows due to sleeping with mouth open. Now using chin strap. Feels it is beneficial. Some exercise.  Waking up once at 4 am to go to the bathroom. Trouble getting back to sleep.     Wearing compression socks     PH  DME [facility]    PMH: T2Dm last A1c 6.1. htn, hld, dipika    Subjective      Past Medical History:   Past Medical History:   Diagnosis Date    Abnormal EKG     Bilateral lower extremity edema.     Colon cancer     Colon cancer     CPAP (continuous positive airway pressure) dependence     Diabetes mellitus     Enlarged prostate     Hip arthrosis Rd    History of chemotherapy     x6 months.    History of radiation therapy 2020    prostate bed s/p prostatectomy    Hypertension     Obesity     Prostate cancer     Sleep apnea     Wears glasses        Past Surgical History:   Past Surgical History:   Procedure Laterality Date    COLECTOMY PARTIAL / TOTAL      COLONOSCOPY  2017    PROSTATE SURGERY  01/10/2020    PROSTATECTOMY N/A 1/10/2020    Procedure: PROSTATECTOMY LAPAROSCOPIC WITH DAVINCI ROBOT;  Surgeon: Andrés Dietz MD;  Location: [facility];  Service: DaVinci    TUMOR  REMOVAL Right 2016    Shoulder- Benign    VASECTOMY         Family History:   Family History   Problem Relation Age of Onset    Hyperlipidemia Other     Heart disease Mother     Diabetes Father     Stroke Father     Cancer Father     Heart disease Father     Hypertension Father     Diabetes Sister     Heart attack Sister     Diabetes Sister     Diabetes Sister     Diabetes Brother        Social History:   Social History     Socioeconomic History    Marital status:    Tobacco Use    Smoking status: Never    Smokeless tobacco: Never   Vaping Use    Vaping Use: Never used   Substance and Sexual Activity    Alcohol use: Yes     Alcohol/week: 3.0 standard drinks of alcohol     Types: 2 Cans of beer, 1 Drinks containing 0.5 oz of alcohol per week    Drug use: No    Sexual activity: Yes     Partners: Female     Birth control/protection: None       Medications:     Current Outpatient Medications:     bumetanide (BUMEX) 0.5 MG tablet, TAKE 1 TABLET DAILY, Disp: 90 tablet, Rfl: 3    Chlorhexidine Gluconate 4 % solution, Shower with hibiclens solution as directed for 5 days prior to surgery., Disp: 237 mL, Rfl: 0    cholecalciferol (VITAMIN D3) 1.25 MG (28541 UT) capsule, Take 1 capsule by mouth Every 7 (Seven) Days., Disp: , Rfl:     Continuous Blood Gluc Sensor (FreeStyle Hebert 14 Day Sensor) misc, Apply 1 Device topically to the appropriate area as directed Continuous., Disp: , Rfl:     ibuprofen (ADVIL,MOTRIN) 800 MG tablet, if needed., Disp: , Rfl:     losartan (COZAAR) 100 MG tablet, , Disp: , Rfl:     meloxicam (MOBIC) 15 MG tablet, Daily., Disp: , Rfl:     Multiple Vitamins-Minerals (CENTRUM SILVER 50+MEN) tablet, Take 1 tablet by mouth Daily., Disp: , Rfl:     OneTouch Verio test strip, , Disp: , Rfl:     rosuvastatin (CRESTOR) 5 MG tablet, TAKE 1 TABLET DAILY, Disp: 90 tablet, Rfl: 2    triamterene-hydrochlorothiazide (MAXZIDE) 75-50 MG per tablet, TAKE 1 TABLET DAILY, Disp: 90 tablet, Rfl: 3    Trulicity 4.5  MG/0.5ML solution pen-injector, Inject 0.5 mL as directed 1 (One) Time Per Week., Disp: , Rfl:     Allergies:   Allergies   Allergen Reactions    Lisinopril Cough       PHQ-9 Total Score:     RICCARDO Fall Risk Assessment has not been completed.    Objective     Physical Exam:   Physical Exam  Neurological:      Mental Status: He is oriented to person, place, and time.      Coordination: Romberg Test normal.      Gait: Gait is intact.      Deep Tendon Reflexes:      Reflex Scores:       Bicep reflexes are 2+ on the right side and 2+ on the left side.       Patellar reflexes are 2+ on the right side and 2+ on the left side.       Achilles reflexes are 2+ on the right side and 2+ on the left side.  Psychiatric:         Speech: Speech normal.         Neurologic Exam     Mental Status   Oriented to person, place, and time.   Follows 3 step commands.   Attention: normal. Concentration: normal.   Speech: speech is normal   Level of consciousness: alert  Knowledge: consistent with education.   Normal comprehension.     Cranial Nerves     CN III, IV, VI   Right pupil: Accommodation: intact.   Left pupil: Accommodation: intact.   CN III: no CN III palsy  CN VI: no CN VI palsy  Nystagmus: none   Diplopia: none  Upgaze: normal  Downgaze: normal  Conjugate gaze: present    CN VII   Facial expression full, symmetric.     CN VIII   Hearing: intact    CN XII   CN XII normal.     Motor Exam   Muscle bulk: normal  Overall muscle tone: normal    Strength   Right biceps: 5/5  Left biceps: 5/5  Right triceps: 5/5  Left triceps: 5/5  Right interossei: 5/5  Left interossei: 5/5  Right quadriceps: 5/5  Left quadriceps: 5/5  Right anterior tibial: 5/5  Left anterior tibial: 5/5  Right posterior tibial: 5/5  Left posterior tibial: 5/5    Sensory Exam   Light touch normal.     Gait, Coordination, and Reflexes     Gait  Gait: normal    Coordination   Romberg: negative    Tremor   Resting tremor: absent  Action tremor: absent    Reflexes   Right  "biceps: 2+  Left biceps: 2+  Right patellar: 2+  Left patellar: 2+  Right achilles: 2+  Left achilles: 2+  Right : 2+  Left : 2+       Vital Signs:   Vitals:    10/25/23 0836   BP: 134/66   Pulse: 64   SpO2: 97%   Weight: 136 kg (300 lb)   Height: 175.3 cm (69.02\")     Body mass index is 44.28 kg/m².              Assessment / Plan      Assessment/Plan:   Diagnoses and all orders for this visit:    1. DIPIKA (obstructive sleep apnea) (Primary)  Comments:  Cont  Cpap, compression hose.             Patient Education:     Reviewed medications, potential side effects and signs and symptoms to report. Discussed risk versus benefits of treatment plan with patient and/or family-including medications, labs and radiology that may be ordered. Addressed questions and concerns during visit. Patient and/or family verbalized understanding and agree with plan. Instructed to call the office with any questions and report to ER with any life-threatening symptoms.     Follow Up:   Return in about 1 year (around 10/25/2024) for Recheck.    During this visit the following were done:  Labs Reviewed [x]    Labs Ordered []    Radiology Reports Reviewed []    Radiology Ordered []    PCP Records Reviewed []    Referring Provider Records Reviewed []    ER Records Reviewed []    Hospital Records Reviewed []    History Obtained From Family []    Radiology Images Reviewed []    Other Reviewed []    Records Requested []      Alton Lomas, BOBBY, APRN  "

## 2023-10-27 ENCOUNTER — PATIENT ROUNDING (BHMG ONLY) (OUTPATIENT)
Dept: NEUROLOGY | Facility: CLINIC | Age: 63
End: 2023-10-27
Payer: COMMERCIAL

## 2023-10-27 NOTE — PROGRESS NOTES
A My-Chart message has been sent to the patient for PATIENT ROUNDING with [facility]

## 2023-10-30 DIAGNOSIS — E78.5 HYPERLIPIDEMIA, UNSPECIFIED HYPERLIPIDEMIA TYPE: Primary | ICD-10-CM

## 2023-10-30 RX ORDER — ROSUVASTATIN CALCIUM 5 MG/1
TABLET, COATED ORAL
Qty: 90 TABLET | Refills: 3 | Status: SHIPPED | OUTPATIENT
Start: 2023-10-30

## 2023-11-01 ENCOUNTER — OFFICE VISIT (OUTPATIENT)
Dept: CARDIOLOGY | Facility: CLINIC | Age: 63
End: 2023-11-01
Payer: COMMERCIAL

## 2023-11-01 VITALS
WEIGHT: 302.8 LBS | DIASTOLIC BLOOD PRESSURE: 68 MMHG | OXYGEN SATURATION: 98 % | BODY MASS INDEX: 44.85 KG/M2 | HEIGHT: 69 IN | SYSTOLIC BLOOD PRESSURE: 128 MMHG | HEART RATE: 70 BPM

## 2023-11-01 DIAGNOSIS — E78.5 HYPERLIPIDEMIA LDL GOAL <100: ICD-10-CM

## 2023-11-01 DIAGNOSIS — I10 ESSENTIAL HYPERTENSION: Primary | ICD-10-CM

## 2023-11-01 DIAGNOSIS — R60.0 LOWER EXTREMITY EDEMA: ICD-10-CM

## 2023-11-01 PROCEDURE — 99214 OFFICE O/P EST MOD 30 MIN: CPT | Performed by: INTERNAL MEDICINE

## 2023-11-01 NOTE — PROGRESS NOTES
[facility] Cardiology    Patient ID: Tyrell Guzmán is a 63 y.o. male.  : 1960   Contact: [phone]    Encounter date: 2023    PCP: Shan Villalobos MD      Chief complaint:   Chief Complaint   Patient presents with    Essential hypertension     1-yr F/U       Problem List:  Abnormal EKG revealing poor anterior R-wave progression, inferior lateral T-wave inversion:  Reportedly normal stress test,  -- data deficit.  Echocardiogram, 2008: EF > 55%, trace MR/TR with normal RVSP.  Cardiolite GXT, 2008: EF 66% with no inducible ischemia.  Echocardiogram, 2014: Normal EF. Minimally reduced exercise tolerance.  Stress echo, 2017: EF 60%. Excellent exercise tolerance, expected 09:40 and actual duration of 10:16. Normal BP and HR response to exercise. Abnormal baseline EKG which became more abnormal with exercise as expected. No evidence of inducible ischemia.  Bilateral lower extremity edema.  Venous duplex, 2023: There is deep venous insufficiency of the right lower extremity in the following vessel(s): deep femoral vein. There is superficial venous insufficiency of the right lower extremity in the following vessel(s): great saphenous vein at thigh, great saphenous vein at knee and great saphenous vein at calf.  Hypertension.  Diabetes mellitus.  Obesity.  Colon cancer:  Colectomy, 2010, Dr. Davis.  Chemotherapy x6 months.  Surgical history:  Vasectomy.  Colectomy.    Allergies   Allergen Reactions    Lisinopril Cough       Current Medications:    Current Outpatient Medications:     bumetanide (BUMEX) 0.5 MG tablet, TAKE 1 TABLET DAILY, Disp: 90 tablet, Rfl: 3    cholecalciferol (VITAMIN D3) 1.25 MG (80907 UT) capsule, Take 1 capsule by mouth Every 7 (Seven) Days., Disp: , Rfl:     Continuous Blood Gluc Sensor (FreeStyle Hebert 14 Day Sensor) misc, Apply 1 Device topically to the appropriate area as directed Continuous., Disp:  , Rfl:     ibuprofen (ADVIL,MOTRIN) 800 MG tablet, if needed., Disp: , Rfl:     losartan (COZAAR) 100 MG tablet, , Disp: , Rfl:     meloxicam (MOBIC) 15 MG tablet, Daily., Disp: , Rfl:     Multiple Vitamins-Minerals (CENTRUM SILVER 50+MEN) tablet, Take 1 tablet by mouth Daily., Disp: , Rfl:     OneTouch Verio test strip, , Disp: , Rfl:     rosuvastatin (CRESTOR) 5 MG tablet, TAKE 1 TABLET DAILY, Disp: 90 tablet, Rfl: 3    triamterene-hydrochlorothiazide (MAXZIDE) 75-50 MG per tablet, TAKE 1 TABLET DAILY, Disp: 90 tablet, Rfl: 3    Trulicity 4.5 MG/0.5ML solution pen-injector, Inject 0.5 mL as directed 1 (One) Time Per Week., Disp: , Rfl:     Chlorhexidine Gluconate 4 % solution, Shower with hibiclens solution as directed for 5 days prior to surgery. (Patient not taking: Reported on 11/1/2023), Disp: 237 mL, Rfl: 0    HPI    Tyrell Guzmán is a 63 y.o. male who presents today for a follow up of bilateral LE edema and cardiac risk factors. Since last visit, patient has been doing well overall from a cardiovascular standpoint. Patient reports he has an upcoming right hip replacement with Dr. High in January. He does not have a regular exercise routine due to his hip pain but stays active by walking. Patient states he lost 17 pounds since his last visit and intends to lose more. He questions what kind of compression socks he should be using and was informed. Patient has noticed occasional swelling in his ankles. He denies chest pain, shortness of breath, orthopnea, palpitations, dizziness, and syncope.       The following portions of the patient's history were reviewed and updated as appropriate: allergies, current medications and problem list.    Pertinent positives as listed in the HPI.  All other systems reviewed are negative.         Vitals:    11/01/23 0955   BP: 128/68   BP Location: Left arm   Patient Position: Sitting   Cuff Size: Adult   Pulse: 70   SpO2: 98%   Weight: (!) 137 kg (302 lb 12.8 oz)  "  Height: 175.3 cm (69\")       Physical Exam:  General: Alert and oriented.  Neck: Jugular venous pressure is within normal limits. Carotids have normal upstrokes without bruits.   Cardiovascular: Heart has a nondisplaced focal PMI. Regular rate and rhythm. No murmur, gallop or rub.  Lungs: Clear, no rales or wheezes. Equal expansion is noted.   Extremities: Trace edema.  Skin: Warm and dry.  Neurologic: Nonfocal.     Diagnostic Data (reviewed with patient):  Lab date: 07/24/2023  FLP: , , HDL 39, LDL 63  CMP: Glu 106, BUN 13, Creat 1.07, eGFR >60, Na 141, K 4.1, Cl 100, CO2 31, Ca 9.4, Alk Phos 78, AST 21, ALT 25  TSH: 4.28           Procedures      Assessment:    ICD-10-CM ICD-9-CM   1. Essential hypertension  I10 401.9   2. Hyperlipidemia LDL goal <100  E78.5 272.4   3. Bilateral lower extremity edema.  R60.0 782.3         Plan:  Patient was encouraged to continue to be active and have a healthy diet.  Patient is low risk for upcoming hip replacement surgery from a cardiac standpoint.   Continue on Bumex 0.5 mg daily for fluid retention.   Continue on losartan 100 mg daily for hypertension.   Continue on rosuvastatin 5 mg daily for hyperlipidemia.   Continue all other current medications.  F/up in 12 months, sooner if needed.      Scribed for Suzan Ellis MD by Chanel Handy. 11/1/2023 10:02 EDT    I Suzan Ellis MD personally performed the services described in this documentation as scribed by the above individual in my presence, and it is both accurate and complete.    Suzan Ellis MD, FACC                "

## 2023-12-19 ENCOUNTER — TELEPHONE (OUTPATIENT)
Dept: ORTHOPEDIC SURGERY | Facility: CLINIC | Age: 63
End: 2023-12-19
Payer: COMMERCIAL

## 2023-12-19 NOTE — TELEPHONE ENCOUNTER
Called and spoke to patient's wife Ms. Smalls about her [facility] paperwork to see what dates she is requesting or if she is wanting intermittent leave for appointments. Ms. Smalls states that she is wanting 01/28/24 - 01/24/24 off continuously to care for him while he is under narcotics and after 1/24,she is wanting intermittent leave to provide transportation for him to/from Dr appointments and physical therapy appointments until patient is able to drive. Informed patient, I would fill out the forms and have Dr High sign them. Informed her that Dr High is out until next week but when he gets back, he will sign them and we will get them faxed over to . Patient is asking for a phone call when forms have been faxed. Assured patient we would call her. Patient verbalized understanding.

## 2024-01-04 ENCOUNTER — TELEPHONE (OUTPATIENT)
Dept: ORTHOPEDIC SURGERY | Facility: CLINIC | Age: 64
End: 2024-01-04

## 2024-01-04 ENCOUNTER — PRE-ADMISSION TESTING (OUTPATIENT)
Dept: PREADMISSION TESTING | Facility: HOSPITAL | Age: 64
End: 2024-01-04
Payer: COMMERCIAL

## 2024-01-04 VITALS — HEIGHT: 69 IN | WEIGHT: 299.61 LBS | BODY MASS INDEX: 44.38 KG/M2

## 2024-01-04 VITALS — WEIGHT: 297 LBS | BODY MASS INDEX: 43.99 KG/M2 | HEIGHT: 69 IN

## 2024-01-04 DIAGNOSIS — M16.11 PRIMARY OSTEOARTHRITIS OF RIGHT HIP: ICD-10-CM

## 2024-01-04 DIAGNOSIS — M16.11 PRIMARY OSTEOARTHRITIS OF RIGHT HIP: Primary | ICD-10-CM

## 2024-01-04 LAB
ANION GAP SERPL CALCULATED.3IONS-SCNC: 8 MMOL/L (ref 5–15)
APTT PPP: 28.8 SECONDS (ref 22–39)
BASOPHILS # BLD AUTO: 0.06 10*3/MM3 (ref 0–0.2)
BASOPHILS NFR BLD AUTO: 1 % (ref 0–1.5)
BUN SERPL-MCNC: 12 MG/DL (ref 8–23)
BUN/CREAT SERPL: 11.1 (ref 7–25)
CALCIUM SPEC-SCNC: 9.5 MG/DL (ref 8.6–10.5)
CHLORIDE SERPL-SCNC: 101 MMOL/L (ref 98–107)
CO2 SERPL-SCNC: 29 MMOL/L (ref 22–29)
CREAT SERPL-MCNC: 1.08 MG/DL (ref 0.76–1.27)
CRP SERPL-MCNC: 0.95 MG/DL (ref 0–0.5)
DEPRECATED RDW RBC AUTO: 42.1 FL (ref 37–54)
EGFRCR SERPLBLD CKD-EPI 2021: 78.1 ML/MIN/1.73
EOSINOPHIL # BLD AUTO: 0.22 10*3/MM3 (ref 0–0.4)
EOSINOPHIL NFR BLD AUTO: 3.5 % (ref 0.3–6.2)
ERYTHROCYTE [DISTWIDTH] IN BLOOD BY AUTOMATED COUNT: 13.5 % (ref 12.3–15.4)
ERYTHROCYTE [SEDIMENTATION RATE] IN BLOOD: 18 MM/HR (ref 0–20)
GLUCOSE SERPL-MCNC: 96 MG/DL (ref 65–99)
HBA1C MFR BLD: 5.9 % (ref 4.8–5.6)
HCT VFR BLD AUTO: 45.6 % (ref 37.5–51)
HGB BLD-MCNC: 15 G/DL (ref 13–17.7)
IMM GRANULOCYTES # BLD AUTO: 0.03 10*3/MM3 (ref 0–0.05)
IMM GRANULOCYTES NFR BLD AUTO: 0.5 % (ref 0–0.5)
INR PPP: 1.03 (ref 0.89–1.12)
LYMPHOCYTES # BLD AUTO: 0.93 10*3/MM3 (ref 0.7–3.1)
LYMPHOCYTES NFR BLD AUTO: 14.7 % (ref 19.6–45.3)
MCH RBC QN AUTO: 28.1 PG (ref 26.6–33)
MCHC RBC AUTO-ENTMCNC: 32.9 G/DL (ref 31.5–35.7)
MCV RBC AUTO: 85.4 FL (ref 79–97)
MONOCYTES # BLD AUTO: 0.64 10*3/MM3 (ref 0.1–0.9)
MONOCYTES NFR BLD AUTO: 10.1 % (ref 5–12)
NEUTROPHILS NFR BLD AUTO: 4.43 10*3/MM3 (ref 1.7–7)
NEUTROPHILS NFR BLD AUTO: 70.2 % (ref 42.7–76)
NRBC BLD AUTO-RTO: 0 /100 WBC (ref 0–0.2)
PLATELET # BLD AUTO: 244 10*3/MM3 (ref 140–450)
PMV BLD AUTO: 9.7 FL (ref 6–12)
POTASSIUM SERPL-SCNC: 3.9 MMOL/L (ref 3.5–5.2)
PROTHROMBIN TIME: 13.6 SECONDS (ref 12.2–14.5)
QT INTERVAL: 366 MS
QTC INTERVAL: 371 MS
RBC # BLD AUTO: 5.34 10*6/MM3 (ref 4.14–5.8)
SODIUM SERPL-SCNC: 138 MMOL/L (ref 136–145)
WBC NRBC COR # BLD AUTO: 6.31 10*3/MM3 (ref 3.4–10.8)

## 2024-01-04 PROCEDURE — 86140 C-REACTIVE PROTEIN: CPT

## 2024-01-04 PROCEDURE — 80048 BASIC METABOLIC PNL TOTAL CA: CPT

## 2024-01-04 PROCEDURE — 85652 RBC SED RATE AUTOMATED: CPT

## 2024-01-04 PROCEDURE — 36415 COLL VENOUS BLD VENIPUNCTURE: CPT

## 2024-01-04 PROCEDURE — 85610 PROTHROMBIN TIME: CPT

## 2024-01-04 PROCEDURE — 85730 THROMBOPLASTIN TIME PARTIAL: CPT

## 2024-01-04 PROCEDURE — 85025 COMPLETE CBC W/AUTO DIFF WBC: CPT

## 2024-01-04 PROCEDURE — 83036 HEMOGLOBIN GLYCOSYLATED A1C: CPT

## 2024-01-04 PROCEDURE — 93005 ELECTROCARDIOGRAM TRACING: CPT

## 2024-01-04 NOTE — PAT
Prescription for Chlorhexidine shower called into patient's pharmacy or [facility] pharmacy by patient's surgeon.  Reinforced with patient to  the prescription from applicable pharmacy if they haven't already.  Verbal and written instructions given regarding proper use of Chlorhexidine body wash to patient and/or famlily during PAT visit. Patient/family also instructed to complete checklist and return it to Pre-op on the day of surgery.  Patient and/or family verbalized understanding.    Patient to apply Chlorhexadine wipes  to surgical area (as instructed) the night before procedure and the AM of procedure. Wipes provided.    Patient instructed to drink 20 ounces of Gatorade or Gatorlyte (if diabetic) and it needs to be completed 1 hour (for Main OR patients) or 2 hours ([facility]) before given arrival time for procedure (NO RED Gatorade and NO Gatorade Zero).  Patient verbalized understanding.    Per Anesthesia Request, patient instructed not to take their ACE/ARB medications on the AM of surgery.    Discussed with patient options for receiving total joint replacement education and assessed patient's ability and preference. Joint Replacement Guide given to patient during PAT visit since not received a copy within the last year. Encouraged patient/family to read guide thoroughly and notify PAT staff with any questions or concerns. Handout provided directing patient to links to watch online videos related to joint replacement surgery on the [facility] website. The handout gives detailed instructions for joining an online joint replacement class through Zoom or phone conference offered on . Patient agreed to participate by watching videos online. Patient verbalized understanding of instructions and to complete the online learning tool survey. Encouraged to share information with family and/or . An overview of the joint replacement education was provided during  the visit including general perioperative instructions that are routine for all surgical patients (PAT PASS, wipes, directions to pre-op, etc.).    Clean catch urinalysis not indicated because patient denied recent urinary frequency, urinary urgency, burning/pain upon urination, or flank pain. No recent UTIs.    Cardiac clearance in [facility] per dr lacey from

## 2024-01-16 ENCOUNTER — TRANSCRIBE ORDERS (OUTPATIENT)
Age: 64
End: 2024-01-16
Payer: COMMERCIAL

## 2024-01-16 ENCOUNTER — TELEPHONE (OUTPATIENT)
Dept: ORTHOPEDIC SURGERY | Facility: CLINIC | Age: 64
End: 2024-01-16
Payer: COMMERCIAL

## 2024-01-16 DIAGNOSIS — M16.11 PRIMARY OSTEOARTHRITIS OF RIGHT HIP: Primary | ICD-10-CM

## 2024-01-16 NOTE — TELEPHONE ENCOUNTER
Patient is scheduled to have hip sx 1/18/24- wants to know if he is able to go up and down the stairs after. If not, how long should he wait     Please advise    [phone]

## 2024-01-18 ENCOUNTER — ANESTHESIA EVENT (OUTPATIENT)
Dept: PERIOP | Facility: HOSPITAL | Age: 64
End: 2024-01-18
Payer: COMMERCIAL

## 2024-01-18 ENCOUNTER — APPOINTMENT (OUTPATIENT)
Dept: GENERAL RADIOLOGY | Facility: HOSPITAL | Age: 64
End: 2024-01-18
Payer: COMMERCIAL

## 2024-01-18 ENCOUNTER — ANESTHESIA (OUTPATIENT)
Dept: PERIOP | Facility: HOSPITAL | Age: 64
End: 2024-01-18
Payer: COMMERCIAL

## 2024-01-18 ENCOUNTER — HOSPITAL ENCOUNTER (OUTPATIENT)
Facility: HOSPITAL | Age: 64
Discharge: HOME | End: 2024-01-18
Attending: ORTHOPAEDIC SURGERY | Admitting: ORTHOPAEDIC SURGERY
Payer: COMMERCIAL

## 2024-01-18 VITALS
HEART RATE: 66 BPM | SYSTOLIC BLOOD PRESSURE: 133 MMHG | OXYGEN SATURATION: 96 % | RESPIRATION RATE: 18 BRPM | DIASTOLIC BLOOD PRESSURE: 67 MMHG | TEMPERATURE: 97.3 F

## 2024-01-18 DIAGNOSIS — Z96.641 STATUS POST TOTAL HIP REPLACEMENT, RIGHT: Primary | ICD-10-CM

## 2024-01-18 DIAGNOSIS — M16.11 PRIMARY OSTEOARTHRITIS OF RIGHT HIP: ICD-10-CM

## 2024-01-18 LAB
GLUCOSE BLDC GLUCOMTR-MCNC: 174 MG/DL (ref 70–130)
GLUCOSE BLDC GLUCOMTR-MCNC: 91 MG/DL (ref 70–130)
POTASSIUM SERPL-SCNC: 3.8 MMOL/L (ref 3.5–5.2)

## 2024-01-18 PROCEDURE — 25010000002 BUPIVACAINE 0.5 % SOLUTION: Performed by: ANESTHESIOLOGY

## 2024-01-18 PROCEDURE — 97116 GAIT TRAINING THERAPY: CPT

## 2024-01-18 PROCEDURE — C1776 JOINT DEVICE (IMPLANTABLE): HCPCS | Performed by: ORTHOPAEDIC SURGERY

## 2024-01-18 PROCEDURE — 27130 TOTAL HIP ARTHROPLASTY: CPT | Performed by: PHYSICIAN ASSISTANT

## 2024-01-18 PROCEDURE — 27130 TOTAL HIP ARTHROPLASTY: CPT | Performed by: ORTHOPAEDIC SURGERY

## 2024-01-18 PROCEDURE — 76000 FLUOROSCOPY <1 HR PHYS/QHP: CPT

## 2024-01-18 PROCEDURE — 25810000003 LACTATED RINGERS PER 1000 ML: Performed by: ANESTHESIOLOGY

## 2024-01-18 PROCEDURE — C1755 CATHETER, INTRASPINAL: HCPCS | Performed by: ORTHOPAEDIC SURGERY

## 2024-01-18 PROCEDURE — 82948 REAGENT STRIP/BLOOD GLUCOSE: CPT

## 2024-01-18 PROCEDURE — 97110 THERAPEUTIC EXERCISES: CPT

## 2024-01-18 PROCEDURE — 25010000002 FENTANYL CITRATE (PF) 50 MCG/ML SOLUTION

## 2024-01-18 PROCEDURE — 25810000003 SODIUM CHLORIDE 0.9 % SOLUTION: Performed by: ORTHOPAEDIC SURGERY

## 2024-01-18 PROCEDURE — 73502 X-RAY EXAM HIP UNI 2-3 VIEWS: CPT

## 2024-01-18 PROCEDURE — 25010000002 PROPOFOL 10 MG/ML EMULSION: Performed by: ANESTHESIOLOGY

## 2024-01-18 PROCEDURE — 25010000002 LABETALOL 5 MG/ML SOLUTION: Performed by: ANESTHESIOLOGY

## 2024-01-18 PROCEDURE — S0260 H&P FOR SURGERY: HCPCS

## 2024-01-18 PROCEDURE — 25010000002 CEFAZOLIN PER 500 MG: Performed by: ORTHOPAEDIC SURGERY

## 2024-01-18 PROCEDURE — 84132 ASSAY OF SERUM POTASSIUM: CPT | Performed by: ANESTHESIOLOGY

## 2024-01-18 PROCEDURE — 25010000002 ROPIVACAINE PER 1 MG: Performed by: ORTHOPAEDIC SURGERY

## 2024-01-18 PROCEDURE — 25010000002 ONDANSETRON PER 1 MG: Performed by: ANESTHESIOLOGY

## 2024-01-18 PROCEDURE — 25010000002 DEXAMETHASONE PER 1 MG: Performed by: ANESTHESIOLOGY

## 2024-01-18 PROCEDURE — 97161 PT EVAL LOW COMPLEX 20 MIN: CPT

## 2024-01-18 DEVICE — POLARSTEM COLLAR LATERAL                                    NON-CEMENTED WITH TI/HA 4
Type: IMPLANTABLE DEVICE | Site: HIP | Status: FUNCTIONAL
Brand: POLARSTEM

## 2024-01-18 DEVICE — R3 3 HOLE ACETABULAR SHELL 56MM
Type: IMPLANTABLE DEVICE | Site: HIP | Status: FUNCTIONAL
Brand: R3 ACETABULAR

## 2024-01-18 DEVICE — R3 0 DEGREE XLPE ACETABULAR LINER                                    36MM INNER DIAMETER X OUTER DIAMETER 56MM
Type: IMPLANTABLE DEVICE | Site: HIP | Status: FUNCTIONAL
Brand: R3

## 2024-01-18 DEVICE — IMPLANTABLE DEVICE: Type: IMPLANTABLE DEVICE | Site: HIP | Status: FUNCTIONAL

## 2024-01-18 DEVICE — REFLECTION SPHERICAL HEAD SCREW 25MM
Type: IMPLANTABLE DEVICE | Site: HIP | Status: FUNCTIONAL
Brand: REFLECTION

## 2024-01-18 DEVICE — DEV CONTRL TISS STRATAFIX SPIRAL MNCRYL UD 3/0 PLS 60CM: Type: IMPLANTABLE DEVICE | Site: HIP | Status: FUNCTIONAL

## 2024-01-18 DEVICE — R3 US DELTA HEAD 36 +4
Type: IMPLANTABLE DEVICE | Site: HIP | Status: FUNCTIONAL
Brand: R3

## 2024-01-18 DEVICE — DEV CONTRL TISS STRATAFIX SYMM PDS PLUS VIL CT-1 45CM: Type: IMPLANTABLE DEVICE | Site: HIP | Status: FUNCTIONAL

## 2024-01-18 RX ORDER — ASPIRIN 81 MG/1
81 TABLET ORAL 2 TIMES DAILY
Qty: 60 TABLET | Refills: 0 | Status: SHIPPED | OUTPATIENT
Start: 2024-01-19

## 2024-01-18 RX ORDER — SODIUM CHLORIDE, SODIUM LACTATE, POTASSIUM CHLORIDE, CALCIUM CHLORIDE 600; 310; 30; 20 MG/100ML; MG/100ML; MG/100ML; MG/100ML
9 INJECTION, SOLUTION INTRAVENOUS CONTINUOUS PRN
Status: DISCONTINUED | OUTPATIENT
Start: 2024-01-18 | End: 2024-01-18 | Stop reason: HOSPADM

## 2024-01-18 RX ORDER — CEFAZOLIN SODIUM IN 0.9 % NACL 3 G/100 ML
3000 INTRAVENOUS SOLUTION, PIGGYBACK (ML) INTRAVENOUS EVERY 8 HOURS
Qty: 200 ML | Refills: 0 | Status: DISCONTINUED | OUTPATIENT
Start: 2024-01-18 | End: 2024-01-18 | Stop reason: HOSPADM

## 2024-01-18 RX ORDER — ROSUVASTATIN CALCIUM 10 MG/1
5 TABLET, COATED ORAL NIGHTLY
Status: DISCONTINUED | OUTPATIENT
Start: 2024-01-18 | End: 2024-01-18 | Stop reason: HOSPADM

## 2024-01-18 RX ORDER — SODIUM CHLORIDE 0.9 % (FLUSH) 0.9 %
10 SYRINGE (ML) INJECTION AS NEEDED
Status: DISCONTINUED | OUTPATIENT
Start: 2024-01-18 | End: 2024-01-18 | Stop reason: HOSPADM

## 2024-01-18 RX ORDER — LABETALOL HYDROCHLORIDE 5 MG/ML
INJECTION, SOLUTION INTRAVENOUS AS NEEDED
Status: DISCONTINUED | OUTPATIENT
Start: 2024-01-18 | End: 2024-01-18 | Stop reason: SURG

## 2024-01-18 RX ORDER — BUMETANIDE 1 MG/1
0.5 TABLET ORAL DAILY
Status: DISCONTINUED | OUTPATIENT
Start: 2024-01-19 | End: 2024-01-18 | Stop reason: HOSPADM

## 2024-01-18 RX ORDER — INSULIN LISPRO 100 [IU]/ML
2-7 INJECTION, SOLUTION INTRAVENOUS; SUBCUTANEOUS
Status: DISCONTINUED | OUTPATIENT
Start: 2024-01-18 | End: 2024-01-18 | Stop reason: HOSPADM

## 2024-01-18 RX ORDER — LIDOCAINE HYDROCHLORIDE 10 MG/ML
INJECTION, SOLUTION EPIDURAL; INFILTRATION; INTRACAUDAL; PERINEURAL AS NEEDED
Status: DISCONTINUED | OUTPATIENT
Start: 2024-01-18 | End: 2024-01-18 | Stop reason: SURG

## 2024-01-18 RX ORDER — MELOXICAM 15 MG/1
15 TABLET ORAL ONCE
Status: COMPLETED | OUTPATIENT
Start: 2024-01-18 | End: 2024-01-18

## 2024-01-18 RX ORDER — FAMOTIDINE 20 MG/1
20 TABLET, FILM COATED ORAL
Status: COMPLETED | OUTPATIENT
Start: 2024-01-18 | End: 2024-01-18

## 2024-01-18 RX ORDER — NICOTINE POLACRILEX 4 MG
15 LOZENGE BUCCAL
Status: DISCONTINUED | OUTPATIENT
Start: 2024-01-18 | End: 2024-01-18 | Stop reason: HOSPADM

## 2024-01-18 RX ORDER — SODIUM CHLORIDE 9 MG/ML
40 INJECTION, SOLUTION INTRAVENOUS AS NEEDED
Status: DISCONTINUED | OUTPATIENT
Start: 2024-01-18 | End: 2024-01-18 | Stop reason: HOSPADM

## 2024-01-18 RX ORDER — FAMOTIDINE 20 MG/1
20 TABLET, FILM COATED ORAL ONCE
Status: DISCONTINUED | OUTPATIENT
Start: 2024-01-18 | End: 2024-01-18

## 2024-01-18 RX ORDER — HYDROMORPHONE HYDROCHLORIDE 1 MG/ML
0.5 INJECTION, SOLUTION INTRAMUSCULAR; INTRAVENOUS; SUBCUTANEOUS
Status: DISCONTINUED | OUTPATIENT
Start: 2024-01-18 | End: 2024-01-18 | Stop reason: HOSPADM

## 2024-01-18 RX ORDER — ONDANSETRON 2 MG/ML
4 INJECTION INTRAMUSCULAR; INTRAVENOUS ONCE AS NEEDED
Status: DISCONTINUED | OUTPATIENT
Start: 2024-01-18 | End: 2024-01-18 | Stop reason: HOSPADM

## 2024-01-18 RX ORDER — TRANEXAMIC ACID 10 MG/ML
1000 INJECTION, SOLUTION INTRAVENOUS ONCE
Status: DISCONTINUED | OUTPATIENT
Start: 2024-01-18 | End: 2024-01-18 | Stop reason: HOSPADM

## 2024-01-18 RX ORDER — FENTANYL CITRATE 50 UG/ML
INJECTION, SOLUTION INTRAMUSCULAR; INTRAVENOUS
Status: COMPLETED
Start: 2024-01-18 | End: 2024-01-18

## 2024-01-18 RX ORDER — DEXAMETHASONE SODIUM PHOSPHATE 4 MG/ML
INJECTION, SOLUTION INTRA-ARTICULAR; INTRALESIONAL; INTRAMUSCULAR; INTRAVENOUS; SOFT TISSUE AS NEEDED
Status: DISCONTINUED | OUTPATIENT
Start: 2024-01-18 | End: 2024-01-18 | Stop reason: SURG

## 2024-01-18 RX ORDER — CEFAZOLIN SODIUM IN 0.9 % NACL 3 G/100 ML
3 INTRAVENOUS SOLUTION, PIGGYBACK (ML) INTRAVENOUS ONCE
Status: COMPLETED | OUTPATIENT
Start: 2024-01-18 | End: 2024-01-18

## 2024-01-18 RX ORDER — ASPIRIN 81 MG/1
81 TABLET ORAL EVERY 12 HOURS SCHEDULED
Status: DISCONTINUED | OUTPATIENT
Start: 2024-01-19 | End: 2024-01-18 | Stop reason: HOSPADM

## 2024-01-18 RX ORDER — BUPIVACAINE HCL/0.9 % NACL/PF 0.125 %
PLASTIC BAG, INJECTION (ML) EPIDURAL AS NEEDED
Status: DISCONTINUED | OUTPATIENT
Start: 2024-01-18 | End: 2024-01-18 | Stop reason: SURG

## 2024-01-18 RX ORDER — FAMOTIDINE 10 MG/ML
20 INJECTION, SOLUTION INTRAVENOUS ONCE
Status: DISCONTINUED | OUTPATIENT
Start: 2024-01-18 | End: 2024-01-18

## 2024-01-18 RX ORDER — SODIUM CHLORIDE, SODIUM LACTATE, POTASSIUM CHLORIDE, CALCIUM CHLORIDE 600; 310; 30; 20 MG/100ML; MG/100ML; MG/100ML; MG/100ML
9 INJECTION, SOLUTION INTRAVENOUS CONTINUOUS
Status: DISCONTINUED | OUTPATIENT
Start: 2024-01-18 | End: 2024-01-18 | Stop reason: HOSPADM

## 2024-01-18 RX ORDER — LIDOCAINE HYDROCHLORIDE 10 MG/ML
0.5 INJECTION, SOLUTION EPIDURAL; INFILTRATION; INTRACAUDAL; PERINEURAL ONCE AS NEEDED
Status: DISCONTINUED | OUTPATIENT
Start: 2024-01-18 | End: 2024-01-18 | Stop reason: HOSPADM

## 2024-01-18 RX ORDER — OXYCODONE HYDROCHLORIDE 5 MG/1
5 TABLET ORAL EVERY 4 HOURS PRN
Qty: 40 TABLET | Refills: 0 | Status: SHIPPED | OUTPATIENT
Start: 2024-01-18

## 2024-01-18 RX ORDER — MIDAZOLAM HYDROCHLORIDE 1 MG/ML
1 INJECTION INTRAMUSCULAR; INTRAVENOUS
Status: DISCONTINUED | OUTPATIENT
Start: 2024-01-18 | End: 2024-01-18 | Stop reason: HOSPADM

## 2024-01-18 RX ORDER — PREGABALIN 150 MG/1
150 CAPSULE ORAL ONCE
Status: COMPLETED | OUTPATIENT
Start: 2024-01-18 | End: 2024-01-18

## 2024-01-18 RX ORDER — FENTANYL CITRATE 50 UG/ML
50 INJECTION, SOLUTION INTRAMUSCULAR; INTRAVENOUS
Status: DISCONTINUED | OUTPATIENT
Start: 2024-01-18 | End: 2024-01-18 | Stop reason: HOSPADM

## 2024-01-18 RX ORDER — TRANEXAMIC ACID 10 MG/ML
1000 INJECTION, SOLUTION INTRAVENOUS ONCE
Status: COMPLETED | OUTPATIENT
Start: 2024-01-18 | End: 2024-01-18

## 2024-01-18 RX ORDER — LIDOCAINE HYDROCHLORIDE 10 MG/ML
0.5 INJECTION, SOLUTION EPIDURAL; INFILTRATION; INTRACAUDAL; PERINEURAL ONCE AS NEEDED
Status: COMPLETED | OUTPATIENT
Start: 2024-01-18 | End: 2024-01-18

## 2024-01-18 RX ORDER — ROPIVACAINE HYDROCHLORIDE 5 MG/ML
INJECTION, SOLUTION EPIDURAL; INFILTRATION; PERINEURAL AS NEEDED
Status: DISCONTINUED | OUTPATIENT
Start: 2024-01-18 | End: 2024-01-18 | Stop reason: HOSPADM

## 2024-01-18 RX ORDER — NALOXONE HCL 0.4 MG/ML
0.1 VIAL (ML) INJECTION
Status: DISCONTINUED | OUTPATIENT
Start: 2024-01-18 | End: 2024-01-18 | Stop reason: HOSPADM

## 2024-01-18 RX ORDER — DEXTROSE MONOHYDRATE 25 G/50ML
25 INJECTION, SOLUTION INTRAVENOUS
Status: DISCONTINUED | OUTPATIENT
Start: 2024-01-18 | End: 2024-01-18 | Stop reason: HOSPADM

## 2024-01-18 RX ORDER — ONDANSETRON 4 MG/1
4 TABLET, ORALLY DISINTEGRATING ORAL EVERY 6 HOURS PRN
Status: DISCONTINUED | OUTPATIENT
Start: 2024-01-18 | End: 2024-01-18 | Stop reason: HOSPADM

## 2024-01-18 RX ORDER — ACETAMINOPHEN 500 MG
1000 TABLET ORAL EVERY 8 HOURS
Qty: 60 TABLET | Refills: 0 | Status: SHIPPED | OUTPATIENT
Start: 2024-01-18 | End: 2024-01-28

## 2024-01-18 RX ORDER — ONDANSETRON 2 MG/ML
INJECTION INTRAMUSCULAR; INTRAVENOUS AS NEEDED
Status: DISCONTINUED | OUTPATIENT
Start: 2024-01-18 | End: 2024-01-18 | Stop reason: SURG

## 2024-01-18 RX ORDER — SODIUM CHLORIDE 0.9 % (FLUSH) 0.9 %
10 SYRINGE (ML) INJECTION EVERY 12 HOURS SCHEDULED
Status: DISCONTINUED | OUTPATIENT
Start: 2024-01-18 | End: 2024-01-18 | Stop reason: HOSPADM

## 2024-01-18 RX ORDER — LABETALOL HYDROCHLORIDE 5 MG/ML
10 INJECTION, SOLUTION INTRAVENOUS EVERY 4 HOURS PRN
Status: DISCONTINUED | OUTPATIENT
Start: 2024-01-18 | End: 2024-01-18 | Stop reason: HOSPADM

## 2024-01-18 RX ORDER — SODIUM CHLORIDE 9 MG/ML
120 INJECTION, SOLUTION INTRAVENOUS CONTINUOUS
Status: DISCONTINUED | OUTPATIENT
Start: 2024-01-18 | End: 2024-01-18 | Stop reason: HOSPADM

## 2024-01-18 RX ORDER — SODIUM CHLORIDE 0.9 % (FLUSH) 0.9 %
1-10 SYRINGE (ML) INJECTION AS NEEDED
Status: DISCONTINUED | OUTPATIENT
Start: 2024-01-18 | End: 2024-01-18 | Stop reason: HOSPADM

## 2024-01-18 RX ORDER — MAGNESIUM HYDROXIDE 1200 MG/15ML
LIQUID ORAL AS NEEDED
Status: DISCONTINUED | OUTPATIENT
Start: 2024-01-18 | End: 2024-01-18 | Stop reason: HOSPADM

## 2024-01-18 RX ORDER — LOSARTAN POTASSIUM 50 MG/1
100 TABLET ORAL DAILY
Status: DISCONTINUED | OUTPATIENT
Start: 2024-01-19 | End: 2024-01-18 | Stop reason: HOSPADM

## 2024-01-18 RX ORDER — OXYCODONE HYDROCHLORIDE 5 MG/1
5 TABLET ORAL EVERY 4 HOURS PRN
Status: DISCONTINUED | OUTPATIENT
Start: 2024-01-18 | End: 2024-01-18 | Stop reason: HOSPADM

## 2024-01-18 RX ORDER — MELOXICAM 15 MG/1
15 TABLET ORAL DAILY
Status: DISCONTINUED | OUTPATIENT
Start: 2024-01-19 | End: 2024-01-18 | Stop reason: HOSPADM

## 2024-01-18 RX ORDER — DOCUSATE SODIUM 100 MG/1
100 CAPSULE, LIQUID FILLED ORAL 2 TIMES DAILY
Qty: 30 CAPSULE | Refills: 0 | Status: SHIPPED | OUTPATIENT
Start: 2024-01-18 | End: 2024-02-02

## 2024-01-18 RX ORDER — PROPOFOL 10 MG/ML
VIAL (ML) INTRAVENOUS CONTINUOUS PRN
Status: DISCONTINUED | OUTPATIENT
Start: 2024-01-18 | End: 2024-01-18 | Stop reason: SURG

## 2024-01-18 RX ORDER — IBUPROFEN 600 MG/1
1 TABLET ORAL
Status: DISCONTINUED | OUTPATIENT
Start: 2024-01-18 | End: 2024-01-18 | Stop reason: HOSPADM

## 2024-01-18 RX ORDER — ACETAMINOPHEN 500 MG
1000 TABLET ORAL ONCE
Status: COMPLETED | OUTPATIENT
Start: 2024-01-18 | End: 2024-01-18

## 2024-01-18 RX ORDER — NALOXONE HCL 0.4 MG/ML
0.4 VIAL (ML) INJECTION
Status: DISCONTINUED | OUTPATIENT
Start: 2024-01-18 | End: 2024-01-18 | Stop reason: HOSPADM

## 2024-01-18 RX ORDER — ONDANSETRON 2 MG/ML
4 INJECTION INTRAMUSCULAR; INTRAVENOUS EVERY 6 HOURS PRN
Status: DISCONTINUED | OUTPATIENT
Start: 2024-01-18 | End: 2024-01-18 | Stop reason: HOSPADM

## 2024-01-18 RX ORDER — BUPIVACAINE HYDROCHLORIDE 5 MG/ML
INJECTION, SOLUTION PERINEURAL
Status: COMPLETED | OUTPATIENT
Start: 2024-01-18 | End: 2024-01-18

## 2024-01-18 RX ADMIN — PROPOFOL 100 MCG/KG/MIN: 10 INJECTION, EMULSION INTRAVENOUS at 07:34

## 2024-01-18 RX ADMIN — FENTANYL CITRATE 50 MCG: 50 INJECTION, SOLUTION INTRAMUSCULAR; INTRAVENOUS at 11:24

## 2024-01-18 RX ADMIN — DEXAMETHASONE SODIUM PHOSPHATE 4 MG: 4 INJECTION, SOLUTION INTRAMUSCULAR; INTRAVENOUS at 07:44

## 2024-01-18 RX ADMIN — LABETALOL HYDROCHLORIDE 10 MG: 5 INJECTION, SOLUTION INTRAVENOUS at 09:57

## 2024-01-18 RX ADMIN — Medication 100 MCG: at 08:53

## 2024-01-18 RX ADMIN — ONDANSETRON 4 MG: 2 INJECTION INTRAMUSCULAR; INTRAVENOUS at 10:02

## 2024-01-18 RX ADMIN — LIDOCAINE HYDROCHLORIDE 0.5 ML: 10 INJECTION, SOLUTION EPIDURAL; INFILTRATION; INTRACAUDAL; PERINEURAL at 06:37

## 2024-01-18 RX ADMIN — TRANEXAMIC ACID 1000 MG: 10 INJECTION, SOLUTION INTRAVENOUS at 09:59

## 2024-01-18 RX ADMIN — SODIUM CHLORIDE, POTASSIUM CHLORIDE, SODIUM LACTATE AND CALCIUM CHLORIDE 9 ML/HR: 600; 310; 30; 20 INJECTION, SOLUTION INTRAVENOUS at 06:37

## 2024-01-18 RX ADMIN — PREGABALIN 150 MG: 150 CAPSULE ORAL at 06:37

## 2024-01-18 RX ADMIN — BUPIVACAINE HYDROCHLORIDE 1.8 ML: 5 INJECTION, SOLUTION PERINEURAL at 07:36

## 2024-01-18 RX ADMIN — SODIUM CHLORIDE 120 ML/HR: 9 INJECTION, SOLUTION INTRAVENOUS at 12:43

## 2024-01-18 RX ADMIN — LABETALOL HYDROCHLORIDE 10 MG: 5 INJECTION, SOLUTION INTRAVENOUS at 09:32

## 2024-01-18 RX ADMIN — CEFAZOLIN 3000 MG: 10 INJECTION, POWDER, FOR SOLUTION INTRAVENOUS at 13:37

## 2024-01-18 RX ADMIN — FAMOTIDINE 20 MG: 20 TABLET ORAL at 06:37

## 2024-01-18 RX ADMIN — CEFAZOLIN 3 G: 10 INJECTION, POWDER, FOR SOLUTION INTRAVENOUS at 07:40

## 2024-01-18 RX ADMIN — LIDOCAINE HYDROCHLORIDE 50 MG: 10 INJECTION, SOLUTION EPIDURAL; INFILTRATION; INTRACAUDAL; PERINEURAL at 07:30

## 2024-01-18 RX ADMIN — MELOXICAM 15 MG: 15 TABLET ORAL at 06:37

## 2024-01-18 RX ADMIN — TRANEXAMIC ACID 1000 MG: 10 INJECTION, SOLUTION INTRAVENOUS at 07:40

## 2024-01-18 RX ADMIN — ACETAMINOPHEN 1000 MG: 500 TABLET ORAL at 06:37

## 2024-01-18 RX ADMIN — OXYCODONE HYDROCHLORIDE 5 MG: 5 TABLET ORAL at 12:43

## 2024-01-18 NOTE — H&P
"Pre-Op H&P  Tyrell Guzmán  [id number]  1960    Chief complaint: \" I am here for a right total hip replacement.\"    HPI:    Patient is a 63 y.o.male who presents with primary osteoarthritis of right hip.  He presents today for scheduled surgery and anticipates a right modified anterior total hip arthroplasty-RIGHT.  Right hip pain has been ongoing for approximately 1.5 years.  Patient experiences right hip pain localized to the groin and experiences symptoms of stiffness and giving way/buckling.  Pain is exacerbated by standing, walking, climbing stairs. Conservative treatment methods have not provided adequate results and therefore he seeks surgical intervention.  Patient denies taking any anticoagulant or antiplatelet medications.  He denies any changes since his office visit with Dr. High on 10/9/2023.    Review of Systems:  General ROS: negative for chills, fever or skin lesions;  No changes since last office visit.  Neg for recent sick exposure  Cardiovascular ROS: no chest pain or dyspnea on exertion  Respiratory ROS: no cough, shortness of breath, or wheezing    Allergies: Denies allergy to latex or contrast dye.  Allergies   Allergen Reactions    Lisinopril Cough       Home Meds:    No current facility-administered medications on file prior to encounter.     Current Outpatient Medications on File Prior to Encounter   Medication Sig Dispense Refill    bumetanide (BUMEX) 0.5 MG tablet TAKE 1 TABLET DAILY (Patient taking differently: Take 1 tablet by mouth Daily.) 90 tablet 3    Chlorhexidine Gluconate 4 % solution Shower with hibiclens solution as directed for 5 days prior to surgery. 237 mL 0    cholecalciferol (VITAMIN D3) 1.25 MG (22138 UT) capsule Take 1 capsule by mouth Every 7 (Seven) Days.      losartan (COZAAR) 100 MG tablet Take 1 tablet by mouth Daily.      meloxicam (MOBIC) 15 MG tablet Take 1 tablet by mouth Daily As Needed for Moderate Pain or Mild Pain.      Multiple Vitamins-Minerals " (CENTRUM SILVER 50+MEN) tablet Take 1 tablet by mouth Daily.      triamterene-hydrochlorothiazide (MAXZIDE) 75-50 MG per tablet TAKE 1 TABLET DAILY (Patient taking differently: Take 1 tablet by mouth Daily.) 90 tablet 3    ibuprofen (ADVIL,MOTRIN) 800 MG tablet Take 1 tablet by mouth Every 8 (Eight) Hours As Needed for Moderate Pain.         PMH:   Past Medical History:   Diagnosis Date    Abnormal EKG     Bilateral lower extremity edema.     Colon cancer     colectomy    CPAP (continuous positive airway pressure) dependence     Diabetes mellitus     Enlarged prostate     Hip arthrosis Rd    History of chemotherapy     x6 months.    History of radiation therapy 06/18/2020    prostate bed s/p prostatectomy    Hypertension     Obesity     Prostate cancer     Sleep apnea     Wears glasses      PSH:    Past Surgical History:   Procedure Laterality Date    COLECTOMY PARTIAL / TOTAL      COLONOSCOPY  2017    PROSTATECTOMY N/A 01/10/2020    Procedure: PROSTATECTOMY LAPAROSCOPIC WITH DAVINCI ROBOT;  Surgeon: Andrés Dietz MD;  Location: [facility];  Service: DaVinci    TUMOR REMOVAL Right 2016    Shoulder- Benign    VASECTOMY         Immunization History:  Influenza: Yes  Pneumococcal: No  Tetanus: No    Social History:   Tobacco:   Social History     Tobacco Use   Smoking Status Never    Passive exposure: Past   Smokeless Tobacco Never      Alcohol:     Social History     Substance and Sexual Activity   Alcohol Use Yes    Alcohol/week: 3.0 standard drinks of alcohol    Types: 2 Cans of beer, 1 Drinks containing 0.5 oz of alcohol per week       Vitals:           /90 (BP Location: Right arm, Patient Position: Lying)   Pulse 66   Temp 97 °F (36.1 °C) (Temporal)   Resp 16   SpO2 96%     Physical Exam:  General Appearance:    Alert, cooperative, no distress, appears stated age   Head:    Normocephalic, without obvious abnormality, atraumatic   Lungs:     Clear to auscultation bilaterally, respirations unlabored     Heart:   Regular rate and rhythm, S1 and S2 normal, no murmur, rub    or gallop    Abdomen:    Soft, nontender.  +bowel sounds   Breast Exam:    deferred   Genitalia:    deferred   Extremities:   Extremities normal, atraumatic, no cyanosis or edema   Skin:   Skin color, texture, turgor normal, no rashes or lesions   Neurologic:   Grossly intact   Results Review  LABS:  Lab Results   Component Value Date    WBC 6.31 01/04/2024    HGB 15.0 01/04/2024    HCT 45.6 01/04/2024    MCV 85.4 01/04/2024     01/04/2024    NEUTROABS 4.43 01/04/2024    GLUCOSE 96 01/04/2024    BUN 12 01/04/2024    CREATININE 1.08 01/04/2024    EGFRIFAFRI 90 01/11/2020     01/04/2024    K 3.8 01/18/2024     01/04/2024    CO2 29.0 01/04/2024    CALCIUM 9.5 01/04/2024    ALBUMIN 4.33 07/25/2018    AST 23 07/25/2018    ALT 31 07/25/2018    BILITOT 0.6 07/25/2018    PTT 28.8 01/04/2024    INR 1.03 01/04/2024       RADIOLOGY:  No radiology results for the last 3 days     I reviewed the patient's new clinical results.    Cancer Staging (if applicable)  Cancer Patient: __ yes __no __unknown; If yes, clinical stage T:__ N:__M:__, stage group or __N/A    Impression: Patient presents with primary osteoarthritis of right hip.    Plan: Dr. High will perform a right modified anterior total hip arthroplasty-RIGHT.       Jose Nowak PA-C   01/18/24   7:03 AM EST       Agree with above - plan for right MAGGI    Shaq High MD  01/18/24  07:25 EST

## 2024-01-18 NOTE — PLAN OF CARE
Goal Outcome Evaluation:  Plan of Care Reviewed With: patient        Progress: no change  Outcome Evaluation: Pt amb 50' with FWW and CGAx2. Pt demonstrated decreased weight shifting onto RLE with heavy reliance on BUE for support. No knee buckling noted. Activity limited by reports of lightheadedness. BP recorded at 123/75. Additional ambulation encouraged tonight. Plan to check back on pt this evening as schedule allows. Pt not cleared to d/c from a PT standpoint at this time. Recommend d/c home with assist and HHPT when cleared.      Anticipated Discharge Disposition (PT): home with assist, home with home health

## 2024-01-18 NOTE — ANESTHESIA PREPROCEDURE EVALUATION
Anesthesia Evaluation     Patient summary reviewed and Nursing notes reviewed   no history of anesthetic complications:   NPO Solid Status: > 8 hours  NPO Liquid Status: > 8 hours           Airway   Mallampati: II  TM distance: >3 FB  Neck ROM: full  No difficulty expected  Dental      Pulmonary - normal exam   (+) ,sleep apnea  Cardiovascular - normal exam    (+) hypertension, hyperlipidemia      Neuro/Psych  GI/Hepatic/Renal/Endo    (+) morbid obesity, diabetes mellitus    Musculoskeletal     Abdominal    Substance History      OB/GYN          Other   arthritis,                 Anesthesia Plan    ASA 3     spinal     intravenous induction     Anesthetic plan, risks, benefits, and alternatives have been provided, discussed and informed consent has been obtained with: patient.    Plan discussed with CRNA.    CODE STATUS:

## 2024-01-18 NOTE — H&P
Patient Name: Tyrell Guzmán  MRN: [id number]  : 1960  DOS: 2024    Attending: Shaq High MD    Primary Care Provider: Shan Villalobos MD      Chief complaint: Right hip pain    Subjective   Patient is a pleasant 63 y.o. male presented for scheduled surgery by Dr. High.    Per his note ( The patient is a 63 y.o. male with a history of debilitating right hip pain secondary to osteoarthritis, that failed to improve in spite of conservative treatment. The patient opted for a right total hip arthroplasty at this time and consented for the procedure. Please see my office notes for details with regard to preoperative counseling and operative rationale.).    Patient is known to me from prior hospitalization to [facility] for prostatectomy in  which was done for prostate cancer, he did very well postop following that surgery.    Today he underwent right total hip arthroplasty under spinal anesthesia, tolerated surgery well.    Seen in his room postop, doing fairly well, good pain control, complains of nausea, vomiting, or shortness of breath.    He has no history of DVT or PE.    He has history of multiple medical problems including hypertension dyslipidemia and sleep apnea for which he uses CPAP at home.  He has history of diabetes mellitus, preop hemoglobin A1c 5.9.        Lives at home with his wife, his bedroom is upstairs and he has 14 steps to get there.    Allergies   Allergen Reactions    Lisinopril Cough          Medications Prior to Admission   Medication Sig Dispense Refill Last Dose    B Complex Vitamins (VITAMIN B COMPLEX PO) Take 1 tablet by mouth Daily.   2024    bumetanide (BUMEX) 0.5 MG tablet TAKE 1 TABLET DAILY (Patient taking differently: Take 1 tablet by mouth Daily.) 90 tablet 3 2024    Chlorhexidine Gluconate 4 % solution Shower with hibiclens solution as directed for 5 days prior to surgery. 237 mL 0 Past Week    cholecalciferol (VITAMIN  D3) 1.25 MG (91695 UT) capsule Take 1 capsule by mouth Every 7 (Seven) Days.   Past Week    losartan (COZAAR) 100 MG tablet Take 1 tablet by mouth Daily.   1/17/2024    meloxicam (MOBIC) 15 MG tablet Take 1 tablet by mouth Daily As Needed for Moderate Pain or Mild Pain.   1/17/2024    Multiple Vitamins-Minerals (CENTRUM SILVER 50+MEN) tablet Take 1 tablet by mouth Daily.   1/17/2024    rosuvastatin (CRESTOR) 5 MG tablet TAKE 1 TABLET DAILY (Patient taking differently: Take 1 tablet by mouth Daily.) 90 tablet 3 1/17/2024    triamterene-hydrochlorothiazide (MAXZIDE) 75-50 MG per tablet TAKE 1 TABLET DAILY (Patient taking differently: Take 1 tablet by mouth Daily.) 90 tablet 3 1/17/2024    ibuprofen (ADVIL,MOTRIN) 800 MG tablet Take 1 tablet by mouth Every 8 (Eight) Hours As Needed for Moderate Pain.   More than a month    Trulicity 4.5 MG/0.5ML solution pen-injector Inject 0.5 mL as directed 1 (One) Time Per Week. Saturday 1/13/2024         History:   Past Medical History:   Diagnosis Date    Abnormal EKG     Bilateral lower extremity edema.     Colon cancer     colectomy    CPAP (continuous positive airway pressure) dependence     Diabetes mellitus     Enlarged prostate     Hip arthrosis Rd    History of chemotherapy     x6 months.    History of radiation therapy 06/18/2020    prostate bed s/p prostatectomy    Hypertension     Obesity     Prostate cancer     Sleep apnea     Wears glasses      Past Surgical History:   Procedure Laterality Date    COLECTOMY PARTIAL / TOTAL      COLONOSCOPY  2017    PROSTATECTOMY N/A 01/10/2020    Procedure: PROSTATECTOMY LAPAROSCOPIC WITH DAVINCI ROBOT;  Surgeon: Andrés Dietz MD;  Location: [facility];  Service: DaVinci    TUMOR REMOVAL Right 2016    Shoulder- Benign    VASECTOMY       Family History   Problem Relation Age of Onset    Heart disease Mother     Diabetes Father     Stroke Father     Cancer Father     Heart disease Father     Hypertension Father     Diabetes  "Sister     Heart attack Sister     Diabetes Sister     Diabetes Sister     Diabetes Brother     Hyperlipidemia Other      Social History     Tobacco Use    Smoking status: Never     Passive exposure: Past    Smokeless tobacco: Never   Vaping Use    Vaping Use: Never used   Substance Use Topics    Alcohol use: Yes     Alcohol/week: 3.0 standard drinks of alcohol     Types: 2 Cans of beer, 1 Drinks containing 0.5 oz of alcohol per week    Drug use: No       Review of Systems  Pertinent items are noted in HPI    Vital Signs  /69 (BP Location: Right arm)   Pulse 60   Temp 95.8 °F (35.4 °C) (Axillary)   Resp 16   SpO2 97%     Physical Exam:    General Appearance:    Alert, cooperative, in no acute distress   Head:    Normocephalic, without obvious abnormality, atraumatic   Eyes:            Lids and lashes normal, conjunctivae and sclerae normal, no   icterus, no pallor, corneas clear    Ears:    Ears appear intact with no abnormalities noted   Throat:   No oral lesions, no thrush, oral mucosa moist   Neck:   No adenopathy, supple, trachea midline, no thyromegaly         Lungs:     Clear to auscultation,respirations regular, even and   unlabored. No wheezes or rales.    Heart:    Regular rhythm and normal rate, normal S1 and S2, no murmur, no gallop   Abdomen:     Normal bowel sounds, no masses, no organomegaly, soft        non-tender, non-distended, no guarding, no rebound tenderness.  Obese   Genitalia:    Deferred   Extremities:  RLE, CDI dressing over right hip.    Pulses:   Pulses palpable and equal bilaterally   Skin:   No bleeding, bruising or rash   Neurologic:   Cranial nerves 2 - 12 grossly intact, lower extremities are still under the effects of spinal anesthesia when seen      I reviewed the patient's new clinical results.             Invalid input(s): \"NEUTOPHILPCT\"  Results from last 7 days   Lab Units 01/18/24  0637   POTASSIUM mmol/L 3.8     Lab Results   Component Value Date    HGBA1C 5.90 (H) " 01/04/2024      Latest Reference Range & Units 01/04/24 08:53   Sodium 136 - 145 mmol/L 138   Potassium 3.5 - 5.2 mmol/L 3.9   Chloride 98 - 107 mmol/L 101   CO2 22.0 - 29.0 mmol/L 29.0   Anion Gap 5.0 - 15.0 mmol/L 8.0   BUN 8 - 23 mg/dL 12   Creatinine 0.76 - 1.27 mg/dL 1.08   BUN/Creatinine Ratio 7.0 - 25.0  11.1   eGFR >60.0 mL/min/1.73 78.1   Glucose 65 - 99 mg/dL 96   Calcium 8.6 - 10.5 mg/dL 9.5   Hemoglobin A1C 4.80 - 5.60 % 5.90 (H)   C-Reactive Protein 0.00 - 0.50 mg/dL 0.95 (H)   Protime 12.2 - 14.5 Seconds 13.6   INR 0.89 - 1.12  1.03   PTT 22.0 - 39.0 seconds 28.8   WBC 3.40 - 10.80 10*3/mm3 6.31   RBC 4.14 - 5.80 10*6/mm3 5.34   Hemoglobin 13.0 - 17.7 g/dL 15.0   Hematocrit 37.5 - 51.0 % 45.6   Platelets 140 - 450 10*3/mm3 244   RDW 12.3 - 15.4 % 13.5   MCV 79.0 - 97.0 fL 85.4   MCH 26.6 - 33.0 pg 28.1   MCHC 31.5 - 35.7 g/dL 32.9   MPV 6.0 - 12.0 fL 9.7   RDW-SD 37.0 - 54.0 fl 42.1   (H): Data is abnormally high      Assessment and Plan:       Status post total hip replacement, right    Essential hypertension    Primary osteoarthritis of right hip    Type 2 diabetes mellitus with hyperglycemia, without long-term current use of insulin    Degenerative arthritis of hip    Hyperlipidemia    Morbid obesity with body mass index (BMI) of 40.0 or higher      Plan:    1. PT/OT,  Weight bearing as tolerated right  LE.  Total hip precautions  2. Pain control-prns  3. IS-encourage  4. DVT proph- Mechanicals and aspirin  5. Bowel regimen  6. Resume home medications as appropriate  7. Monitor post-op labs  8. DC planning for home    - Hypertension:  Resume home medications as appropriate, formulary substitution when indicated.  Holding parameters.    Prn medications for elevated blood pressure.    -Dyslipidemia:  Resume home regimen statin ( formulary substitution when appropriate).    -DM type 2  Hgb A1C 5.9  Hold home regimen as appropriate  FSBG AC/HS, ( q 6 when NPO), along with correction humalog.  Long  acting insulin if needed.    -DIPIKA:  Continue CPAP.   Monitor O2 sats.      Dragon disclaimer:  Part of this encounter note is an electronic transcription/translation of spoken language to printed text. The electronic translation of spoken language may permit erroneous, or at times, nonsensical words or phrases to be inadvertently transcribed; Although I have reviewed the note for such errors, some may still exist.    Graciela Mccormick MD  01/18/24  14:45 EST

## 2024-01-18 NOTE — OP NOTE
DATE OF PROCEDURE:  01/18/24    PREOPERATIVE DIAGNOSIS: right hip arthritis    POSTOPERATIVE DIAGNOSIS: right hip arthritis    PROCEDURE PERFORMED: right total hip arthroplasty with Smith & Nephew components, anterior modified Murphy-Mcdonough approach    Surgical Approach: Hip Modified Anterior (Murphy-Mcdonough)    IMPLANTS: # 56 press-fit R3 cup, 25 screw,  neutral polyethylene liner,  # 4 lateral offset press-fit Polarstem, +4 x 36 Bio-lox ceramic head    SURGEON: Shaq High MD    ASSISTANT: Bertha Welsh PA-C  (Bertha Welsh PA-C was present and necessary for positioning, draping, retraction, instrumentation and closure.)    SPECIMENS: None    IMPLANTS:   Implant Name Type Inv. Item Serial No.  Lot No. LRB No. Used Action   DEV CONTRL TISS STRATAFIX SPIRAL MNCRYL UD 3/0 PLS 60CM - [id number] Implant DEV CONTRL TISS STRATAFIX SPIRAL MNCRYL UD 3/0 PLS 60CM  [employer]  Right 1 Implanted   DEV CONTRL TISS STRATAFIX SYMM PDS PLUS SAMMI CT-1 45CM - [id number] Implant DEV CONTRL TISS STRATAFIX SYMM PDS PLUS SAMMI CT-1 45CM  [employer]  [employer]  Right 1 Implanted   SHLL ACET R3 3H STD 56MM - [id number] Implant SHLL ACET R3 3H STD 56MM  [employer] [id number] Right 1 Implanted   LINER ACET R3 XLPE 0D 33L47UR - [id number] Implant LINER ACET R3 XLPE 0D [id number]  [employer] [id number] Right 1 Implanted   SCRW SPH HD REFLECTION 6.5X25MM - [id number] Implant SCRW SPH HD REFLECTION 6.5X25MM  [employer] [id number] Right 1 Implanted   STEM FEM/HIP POLARSTEM CMTLESS COLAR TI P/COAT HA 126DEG SZ4 - [id number] Implant STEM FEM/HIP POLARSTEM CMTLESS COLAR TI P/COAT HA 126DEG SZ4  [employer] [id number] Right 1 Implanted   HD FEM/HIP BIOLOXDELTA R3 12/14 MD 36MM PLS4 - [id number] Implant HD FEM/HIP BIOLOXDELTA R3 12/14 MD 36MM PLS4  [employer] [id number] Right 1 Implanted         ANESTHESIA:  Spinal    STAFF:  Circulator: Antoine Olvera RN; Samantha Wasserman  RN  Radiology Technologist: Renee Ferro R.T.(R)  Scrub Person: Twan Alamo  Vendor Representative: Homero Wray  Nursing Assistant: Kelly Villa  Assistant: Bertha Welsh PA-C    ESTIMATED BLOOD LOSS: 250 cc     COMPLICATIONS: None    PREOPERATIVE ANTIBIOTICS: Ancef 3 g    INDICATIONS: The patient is a 63 y.o. male with a history of debilitating right hip pain secondary to osteoarthritis, that failed to improve in spite of conservative treatment. The patient opted for a right total hip arthroplasty at this time and consented for the procedure. Please see my office notes for details with regard to preoperative counseling and operative rationale.     DESCRIPTION OF PROCEDURE: The patient was positively identified in the preoperative holding area, brought to the operative suite, and placed in a supine position. After adequate spinal anesthetic had been achieved, the patient was placed in the supine position with a well padded folded blanket bolster under the right flank area, leaving the buttock free. After sterile prep and drape of the right hip and lower extremity, as well as draping the non-operative extremity to allow access for limb length assessment, a timeout procedure was performed to confirm the operative site, as well as the other parameters.     After placing a bump under the knee, a skin incision was made over the lateral border of the tensor fascia latae from the level of the anterior superior iliac spine distally on the anterior aspect of the hip for a modified Murphy-Mcdonough approach. Following a sharp skin incision, dissection was carried down to the level of the fascia, ensuring clear identification of the interval between the tensor and the fascia laterally.  Fascia was then incised from proximal to distal, leaving a good cuff of tissue for later repair, then working form distal to proximal perforating vessels were identified and cauterized, including the perforating vessels along the  "anterior edge of the gluteus medius.  With the anterior edge of the gluteus medius identified, a Cobra retractor was placed on the capsule over the superior aspect of the femoral neck.  After identifying the superior edge of the vastus lateralis distally, a second Cobra retractor was placed over the capsule overlying the inferior femoral neck.  The reflected head of the rectus femoris was identified, and the fascia released enough to allow placement of a single prong acetabular retractor. The knee bump was then removed, leg externally rotated, and anterior capsule excised first laterally then medially, and the labrum incised superiorly.  Cobra retractors were repositioned on the exposed femoral neck both superiorly and inferiorly.  Description of femoral head: Complete eburnation, osteophytes at the head neck junction, mushrooming of the head.  A neck cut was then made at the head and neck junction with the aid of an oscillating saw blade, followed by a second cut at the base of the femoral neck.  The \"napkin ring\" femoral neck fragment was removed, as well as the femoral head after repositioning the posterior Cobra retractor just outside the hip capsule.    Acetabular exposure was then addressed by repositioning the anterior Cobra retractor between the capsule and the psoas tendon.  The labrum was then removed from the rim of the acetabulum anteriorly, superiorly and posteriorly, preserving the transverse acetabular ligament. The anterior Cobra retractor was replaced over the transverse acetabular ligament, and a Bentley retractor placed over the posterior wall of the acetabulum.  Description of acetabulum: Complete wear, with large inferior acetabular osteophyte and anterior acetabular osteophytes, with thickening of the labrum posteriorly. With the transverse acetabular ligament as a reference for cup positioning, the acetabulum was sequentially reamed up to 56 mm to accommodate a 56 press-fit R3 cup, which had " excellent press-fit characteristics.  A single 25 mm screws placed which had excellent purchase, followed by a neutral polyethylene liner.    Attention was then redirected towards the femoral aspect. The non-operative limb was then placed on a padded Barrios stand, to allow for appropriate positioning of the operative limb.  Using the bone hook for traction in the calcar region of the proximal femur, the posterior capsule was released adjacent to the greater trochanter to allow for delivery of the proximal femur with a double fang retractor.  With appropriate external rotation of the proximal femur and further adduction of the leg following double fang retractor placement and removal of the single-toothed fang anteriorly, a Bentley retractor was placed in the region of the calcar and femoral preparations were made to accommodate the polar stem.  Box osteotome was used to prepare the lateral aspect, followed by the T-handle canal finder, then sequential broaching of the femur to accommodate a # 4 broach, lateral offset neck, with a +4 x 36 head.      Trial reduction was performed, full arc of motion noted, with appropriate limb lengths after leveling the table.  Intraoperative fluoroscopy showed appropriate implant alignment and leg lengths.  The hip was again dislocated and the final # 4 lateral offset press-fit Polar stem placed, followed by +4 x 36 ceramic head, with the same reduction characteristics were noted as with the trial with no dislocation throughout the full arc of motion and appropriate limb lengths.      Therefore, the hip was copiously irrigated and attention directed towards closure. Fascia latae was closed with #1 Vicryl in an interrupted figure-of-eight fashion in 3 strategic locations both proximally, distally and in the central portion, followed by oversewing this from distal to proximal with a #1 StrataFix symmetric, which nicely sealed that layer, followed by closure of the subcutaneous layer  with 2-0 Vicryl and the skin with 3-0 StrataFix in a running subcuticular fashion.  Adhesive wound closure dressing was applied followed by a sterile dressing with 4 x 4s secured with micropore tape.  The patient tolerated the procedure well and was brought to the recovery room in good condition.     POSTOPERATIVE PLAN:  1. The patient will begin early range of motion and weight-bearing per the post anterior total hip arthroplasty protocol.   2. I anticipate brief hospitalization for initial rehabilitation and pain control followed by continued rehabilitation home health/outpatient physical therapy setting.  Patient likely ready for discharge later today as long as he is cleared medically and by physical therapy.  Follow-up in 3 weeks as planned.   3. Postoperative medical management with Dr. Mccormick.  4. Postoperative DVT prophylaxis with aspirin.  5. Postoperative IV antibiotics with Ancef.      Shaq High MD  01/18/24  10:43 EST

## 2024-01-18 NOTE — ANESTHESIA PROCEDURE NOTES
Spinal Block      Patient reassessed immediately prior to procedure    Patient location during procedure: OR  Indication:at surgeon's request  Preanesthetic Checklist  Completed: patient identified, IV checked, site marked, risks and benefits discussed, surgical consent, monitors and equipment checked, pre-op evaluation and timeout performed  Spinal Block Prep:  Patient Position:sitting  Sterile Tech:cap, gloves, sterile barriers and mask  Prep:Chloraprep  Patient Monitoring:blood pressure monitoring, continuous pulse oximetry and EKG    Spinal Block Procedure  Approach:midline  Guidance:landmark technique and palpation technique  Location:L4-L5  Needle Type:Emre  Needle Gauge:22 G  Placement of Spinal needle event:cerebrospinal fluid aspirated  Paresthesia: no  Fluid Appearance:clear  Medications: bupivacaine (MARCAINE) 0.5 % injection - Injection   1.8 mL - 1/18/2024 7:36:00 AM   Post Assessment  Patient Tolerance:patient tolerated the procedure well with no apparent complications  Complications no  Additional Notes  Procedure:  Pt assisted to sitting position, with legs in position of comfort over side of bed.  Pt. instructed in optimal spine presentation, the spine was prepped/ Draped and the skin at insertion site was anesthetized with 1% Lidocaine 2 ml.  The spinal needle was then advanced until CSF flow was obtained and LA was injected:

## 2024-01-18 NOTE — THERAPY EVALUATION
Patient Name: Tyrell Guzmán  : 1960    MRN: [id number]                              Today's Date: 2024       Admit Date: 2024    Visit Dx:     ICD-10-CM ICD-9-CM   1. Status post total hip replacement, right  Z96.641 V43.64   2. Primary osteoarthritis of right hip  M16.11 715.15     Patient Active Problem List   Diagnosis    Acute bronchitis    Obstructive apnea    Essential hypertension    Bilateral lower extremity edema.    Abnormal EKG    Obesity    History of colon cancer    Prostate cancer    Diabetes    Status post prostatectomy, lap robotic    Adenomatous duodenal polyp    Eczema    Encounter for drug therapy    History of adenocarcinoma of prostate    Primary osteoarthritis of right hip    Raised prostate specific antigen    Type 2 diabetes mellitus with hyperglycemia, without long-term current use of insulin    Hypertension    Morbid obesity with body mass index (BMI) of 45.0 to 49.9 in adult    Obesity    Obstructive sleep apnea    Malignant tumor of prostate    Degenerative arthritis of hip    Hyperlipidemia    Pain of right hip joint    Morbid obesity with body mass index (BMI) of 40.0 or higher    High prostate specific antigen (PSA)    Status post total hip replacement, right     Past Medical History:   Diagnosis Date    Abnormal EKG     Bilateral lower extremity edema.     Colon cancer     colectomy    CPAP (continuous positive airway pressure) dependence     Diabetes mellitus     Enlarged prostate     Hip arthrosis Rd    History of chemotherapy     x6 months.    History of radiation therapy 2020    prostate bed s/p prostatectomy    Hypertension     Obesity     Prostate cancer     Sleep apnea     Wears glasses      Past Surgical History:   Procedure Laterality Date    COLECTOMY PARTIAL / TOTAL      COLONOSCOPY  2017    PROSTATECTOMY N/A 01/10/2020    Procedure: PROSTATECTOMY LAPAROSCOPIC WITH DAVINCI ROBOT;  Surgeon: Andrés Dietz MD;  Location: [facility] OR;   Service: DaVinci    TUMOR REMOVAL Right 2016    Shoulder- Benign    VASECTOMY        General Information       Row Name 01/18/24 1428          Physical Therapy Time and Intention    Document Type evaluation  -     Mode of Treatment physical therapy  -       Row Name 01/18/24 1428          General Information    Patient Profile Reviewed yes  -     Prior Level of Function min assist:;all household mobility;community mobility;gait;transfer;bed mobility;ADL's  -     Existing Precautions/Restrictions fall;hip, anterior;other (see comments)  Mod Ant  -     Barriers to Rehab none identified  -       Row Name 01/18/24 1428          Living Environment    People in Home spouse  -       Row Name 01/18/24 1428          Home Main Entrance    Number of Stairs, Main Entrance none  -       Row Name 01/18/24 1428          Stairs Within Home, Primary    Stairs, Within Home, Primary flight of steps to BR; not able to stay on main level per pt  -     Number of Stairs, Within Home, Primary twelve  -       Row Name 01/18/24 1428          Cognition    Orientation Status (Cognition) oriented x 3  -[facility] Name 01/18/24 1428          Safety Issues, Functional Mobility    Safety Issues Affecting Function (Mobility) insight into deficits/self-awareness;awareness of need for assistance;safety precaution awareness  -     Impairments Affecting Function (Mobility) balance;endurance/activity tolerance;pain;strength  -               User Key  (r) = Recorded By, (t) = Taken By, (c) = Cosigned By      Initials Name Provider Type     Sangeeta Brush PT Physical Therapist                   Mobility       Row Name 01/18/24 1430          Bed Mobility    Bed Mobility supine-sit  -     Supine-Sit [place] (Bed Mobility) standby assist  -     Assistive Device (Bed Mobility) bed rails;head of bed elevated  -     Comment, (Bed Mobility) VC for sequencing. Increased time and effort secondary to elevated pain in hip  -        Row Name 01/18/24 1430          Transfers    Comment, (Transfers) VC for hand placement; encouraged pt to place hands on bed to push up from and reach back for chair prior to sitting.  -HW       Row Name 01/18/24 1430          Sit-Stand Transfer    Sit-Stand [place] (Transfers) minimum assist (75% patient effort);2 person assist;verbal cues;nonverbal cues (demo/gesture)  -     Assistive Device (Sit-Stand Transfers) walker, front-wheeled  -HW       Row Name 01/18/24 1430 01/18/24 1359       Gait/Stairs (Locomotion)    [place] Level (Gait) contact guard;nonverbal cues (demo/gesture);verbal cues;2 person assist  -HW --    Assistive Device (Gait) walker, front-wheeled  -HW --    Patient was able to Ambulate yes  -HW yes  -HW    Distance in Feet (Gait) 50  -HW 50  -HW    Deviations/Abnormal Patterns (Gait) bilateral deviations;base of support, wide;weight shifting decreased;stride length decreased;gait speed decreased  -HW --    Bilateral Gait Deviations forward flexed posture;heel strike decreased  -HW --    Comment, (Gait/Stairs) Pt amb 50' with step-through gait pattern. Decreased weight shifting onto RLE, heavy reliance on BUE for support, slow gait speed, and decreased stride length B. VC for upright posture, forward gaze, and increased weight acceptance onto RLE. Encouraged pt to loosen up  on FWW and keep it continuously rolling. Difficult for pt to correct secondary to pain. Activity limited by reports of lightheadedness. 's/70's.       At a later time, checked on pt again. He was able to amb 200' with FWW and CGAx2 progressing to step-through gait with continuous forward propulsion of FWW. Pt navigated 10 steps with R HR and SPC. No knee buckling noted. Good recall on sequencing. Spouse present for stair training. Activity limited by elevated pain. RN aware and managing.  -HW --      Row Name 01/18/24 1430          Mobility    Extremity Weight-bearing Status right lower extremity  -      Right Lower Extremity (Weight-bearing Status) weight-bearing as tolerated (WBAT)  -               User Key  (r) = Recorded By, (t) = Taken By, (c) = Cosigned By      Initials Name Provider Type     Sangeeta Brush PT Physical Therapist                   Obj/Interventions       Row Name 01/18/24 1437          Range of Motion Comprehensive    General Range of Motion bilateral lower extremity ROM WFL  -       Row Name 01/18/24 1437          Strength Comprehensive (MMT)    General Manual Muscle Testing (MMT) Assessment lower extremity strength deficits identified  -     Comment, General Manual Muscle Testing (MMT) Assessment Pt able to perform B active ankle DF and LAQ; R SLR limited secondary to pain  -       Row Name 01/18/24 1437          Motor Skills    Therapeutic Exercise hip;knee;ankle  -       Row Name 01/18/24 1437          Hip (Therapeutic Exercise)    Hip (Therapeutic Exercise) AAROM (active assistive range of motion)  -     Hip AAROM (Therapeutic Exercise) right;flexion;aBduction;aDduction;3 repetitions  -       Row Name 01/18/24 1437          Knee (Therapeutic Exercise)    Knee (Therapeutic Exercise) isometric exercises;strengthening exercise  -     Knee Isometrics (Therapeutic Exercise) right;gluteal sets;quad sets;10 repetitions  -     Knee Strengthening (Therapeutic Exercise) right;LAQ (long arc quad);3 repetitions  -       Row Name 01/18/24 1437          Ankle (Therapeutic Exercise)    Ankle (Therapeutic Exercise) AROM (active range of motion)  -     Ankle AROM (Therapeutic Exercise) bilateral;dorsiflexion;plantarflexion;10 repetitions  -       Row Name 01/18/24 1437          Balance    Balance Assessment sitting static balance;sitting dynamic balance;sit to stand dynamic balance;standing static balance;standing dynamic balance  -     Static Sitting Balance standby assist  -     Dynamic Sitting Balance standby assist  -     Position, Sitting Balance sitting edge of bed   -HW     Static Standing Balance contact guard  -HW     Dynamic Standing Balance contact guard  -HW     Position/Device Used, Standing Balance supported;walker, rolling  -HW     Balance Interventions sitting;standing;sit to stand;occupation based/functional task  -       Row Name 01/18/24 1437          Sensory Assessment (Somatosensory)    Sensory Assessment (Somatosensory) LE sensation intact  -               User Key  (r) = Recorded By, (t) = Taken By, (c) = Cosigned By      Initials Name Provider Type     Sangeeta Brush, PT Physical Therapist                   Goals/Plan       Row Name 01/18/24 1443          Bed Mobility Goal 1 (PT)    Activity/Assistive Device (Bed Mobility Goal 1, PT) sit to supine/supine to sit  -HW     [place] Level/Cues Needed (Bed Mobility Goal 1, PT) modified independence  -HW     Time Frame (Bed Mobility Goal 1, PT) long term goal (LTG);3 days  -       Row Name 01/18/24 1443          Transfer Goal 1 (PT)    Activity/Assistive Device (Transfer Goal 1, PT) sit-to-stand/stand-to-sit  -HW     [place] Level/Cues Needed (Transfer Goal 1, PT) modified independence  -HW     Time Frame (Transfer Goal 1, PT) long term goal (LTG);3 days  -       Row Name 01/18/24 1443          Gait Training Goal 1 (PT)    Activity/Assistive Device (Gait Training Goal 1, PT) gait (walking locomotion)  -HW     [place] Level (Gait Training Goal 1, PT) modified independence  -HW     Distance (Gait Training Goal 1, PT) 500  -HW     Time Frame (Gait Training Goal 1, PT) long term goal (LTG);3 days  -       Row Name 01/18/24 1443          Stairs Goal 1 (PT)    Activity/Assistive Device (Stairs Goal 1, PT) ascending stairs;descending stairs  -HW     [place] Level/Cues Needed (Stairs Goal 1, PT) modified independence  -HW     Number of Stairs (Stairs Goal 1, PT) 12  -HW     Time Frame (Stairs Goal 1, PT) long term goal (LTG);3 days  -       Row Name 01/18/24 1443          Therapy  Assessment/Plan (PT)    Planned Therapy Interventions (PT) balance training;bed mobility training;gait training;home exercise program;ROM (range of motion);patient/family education;stair training;strengthening;stretching;transfer training  -               User Key  (r) = Recorded By, (t) = Taken By, (c) = Cosigned By      Initials Name Provider Type    HW Sangeeta Brush PT Physical Therapist                   Clinical Impression       Row Name 01/18/24 1439          Pain    Pretreatment Pain Rating 8/10  -     Posttreatment Pain Rating 7/10  -     Pain Location - Side/Orientation Right  -     Pain Location generalized  -     Pain Location - hip  -     Pain Intervention(s) Repositioned;Ambulation/increased activity;Cold applied;Elevated  -       Row Name 01/18/24 1439          Plan of Care Review    Plan of Care Reviewed With patient  -     Progress no change  -     Outcome Evaluation Pt amb 250' with FWW and CGAx2. Pt demonstrated decreased weight shifting onto RLE with heavy reliance on BUE for support. Improved with VC. Pt navigated 10 steps with CGA and SPC. No knee buckling noted. Activity limited by elevated pain. RN aware and managing. Recommend d/c home with assist and HHPT when medically appropriate. Pt cleared to d/c today from PT standpoint. ADLs assessed and OT consult not warranted at this time.  -       Row Name 01/18/24 1437          Therapy Assessment/Plan (PT)    Rehab Potential (PT) good, to achieve stated therapy goals  -     Criteria for Skilled Interventions Met (PT) yes;meets criteria;skilled treatment is necessary  -     Therapy Frequency (PT) 2 times/day  -       Row Name 01/18/24 1439          Vital Signs    Pre Patient Position Supine  -     Intra Patient Position Standing  -HW     Post Patient Position Sitting  -       Row Name 01/18/24 1439          Positioning and Restraints    Pre-Treatment Position in bed  -HW     Post Treatment Position chair  -HW     In  Chair notified nsg;reclined;sitting;call light within reach;encouraged to call for assist;exit alarm on;legs elevated;compression device  -               User Key  (r) = Recorded By, (t) = Taken By, (c) = Cosigned By      Initials Name Provider Type     Sangeeta Brush PT Physical Therapist                   Outcome Measures       Row Name 01/18/24 1443 01/18/24 1200       How much help from another person do you currently need...    Turning from your back to your side while in flat bed without using bedrails? 3  -HW 3  -PT    Moving from lying on back to sitting on the side of a flat bed without bedrails? 3  -HW 3  -PT    Moving to and from a bed to a chair (including a wheelchair)? 3  - 3  -PT    Standing up from a chair using your arms (e.g., wheelchair, bedside chair)? 3  - 3  -PT    Climbing 3-5 steps with a railing? 2  -HW 2  -PT    To walk in hospital room? 3  - 2  -PT    AM-PAC 6 Clicks Score (PT) 17  - 16  -PT    Highest Level of Mobility Goal 5 --> Static standing  -HW 5 --> Static standing  -PT      Row Name 01/18/24 1443          PADD    Diagnosis 2  -     Gender 2  -     Age Group 4  -     Gait Distance 0  -     Assist Level 1  -     Home Support 3  -     PADD Score 10  -     Patient Preference home with home health  -     Prediction by PADD Score directly home (with home health or out-patient rehab)  -       Row Name 01/18/24 1443          Functional Assessment    Outcome Measure Options AM-PAC 6 Clicks Basic Mobility (PT);PADD  -               User Key  (r) = Recorded By, (t) = Taken By, (c) = Cosigned By      Initials Name Provider Type     Sangeeta Brush, JODI Physical Therapist    PT Carolina Ramos, RN Registered Nurse                                 Physical Therapy Education       Title: PT OT SLP Therapies (Done)       Topic: Physical Therapy (Done)       Point: Mobility training (Done)       Learning Progress Summary             Patient Acceptance, E,D, VU,NR by   at 1/18/2024 1444                         Point: Home exercise program (Done)       Learning Progress Summary             Patient Acceptance, E,D, VU,NR by  at 1/18/2024 1444                         Point: Body mechanics (Done)       Learning Progress Summary             Patient Acceptance, E,D, VU,NR by  at 1/18/2024 1444                         Point: Precautions (Done)       Learning Progress Summary             Patient Acceptance, E,D, VU,NR by  at 1/18/2024 1444                                         User Key       Initials Effective Dates Name Provider Type Discipline     12/15/23 -  Sangeeta Bruhs, PT Physical Therapist PT                  PT Recommendation and Plan  Planned Therapy Interventions (PT): balance training, bed mobility training, gait training, home exercise program, ROM (range of motion), patient/family education, stair training, strengthening, stretching, transfer training  Plan of Care Reviewed With: patient  Progress: no change  Outcome Evaluation: Pt amb 250' with FWW and CGAx2. Pt demonstrated decreased weight shifting onto RLE with heavy reliance on BUE for support. Improved with VC. Pt navigated 10 steps with CGA and SPC. No knee buckling noted. Activity limited by elevated pain. RN aware and managing. Recommend d/c home with assist and HHPT when medically appropriate. Pt cleared to d/c today from PT standpoint. ADLs assessed and OT consult not warranted at this time.     Time Calculation:   PT Evaluation Complexity  History, PT Evaluation Complexity: 1-2 personal factors and/or comorbidities  Examination of Body Systems (PT Eval Complexity): total of 3 or more elements  Clinical Presentation (PT Evaluation Complexity): stable  Clinical Decision Making (PT Evaluation Complexity): low complexity  Overall Complexity (PT Evaluation Complexity): low complexity     PT Charges       Row Name 01/18/24 1445             Time Calculation    Start Time 1343  -      PT Received On 01/18/24   -HW      PT Goal Re-Cert Due Date 01/28/24  -HW         Timed Charges    14892 - PT Therapeutic Exercise Minutes 6  -HW      13438 - Gait Training Minutes  20  -HW         Untimed Charges    PT Eval/Re-eval Minutes 47  -HW         Total Minutes    Timed Charges Total Minutes 26  -HW      Untimed Charges Total Minutes 47  -HW       Total Minutes 73  -HW                User Key  (r) = Recorded By, (t) = Taken By, (c) = Cosigned By      Initials Name Provider Type     Sangeeta Brush PT Physical Therapist                  Therapy Charges for Today       Code Description Service Date Service Provider Modifiers Qty    59040698132 HC PT THER PROC EA 15 MIN 1/18/2024 Sangeeta Brush, PT GP 1    32776992010 HC GAIT TRAINING EA 15 MIN 1/18/2024 Sangeeta Brush, PT GP 1    49361049924 HC PT THER SUPP EA 15 MIN 1/18/2024 Sangeeta Brush, PT GP 4    52743783398 HC PT EVAL LOW COMPLEXITY 4 1/18/2024 Sangeeta Brush, PT GP 1            PT G-Codes  Outcome Measure Options: AM-PAC 6 Clicks Basic Mobility (PT), PADD  AM-PAC 6 Clicks Score (PT): 17  PT Discharge Summary  Anticipated Discharge Disposition (PT): home with assist, home with home health    Sangeeta Brush PT  1/18/2024

## 2024-01-18 NOTE — PLAN OF CARE
Goal Outcome Evaluation:   Patient doing well.Walked with Therapy.Antibiotics done.Vital signs within Normal limits.

## 2024-01-18 NOTE — CASE MANAGEMENT/SOCIAL WORK
Continued Stay Note  [facility]     Patient Name: Tyrell Guzmán  MRN: [id number]  Today's Date: 1/18/2024    Admit Date: 1/18/2024    Plan: home with family   Discharge Plan       Row Name 01/18/24 1215       Plan    Plan home with family    Patient/Family in Agreement with Plan yes    Plan Comments Pt lives in [facility] with his family. He is independent with ADLs and denies current use of any DME. He does own a rolling walker. Pt is followed by his PCP and has drug coverage. At this time his plan for discharge is to return home with outpt PT with [facility]. CM has confirmed they are following him and have him scheduled for his first visit 1/19. No other dc needs at this time    Final Discharge Disposition Code 01 - home or self-care                   Discharge Codes    No documentation.                       Alesia Dietrich RN

## 2024-01-18 NOTE — ANESTHESIA POSTPROCEDURE EVALUATION
Patient: Tyrell Guzmán    Procedure Summary       Date: 01/18/24 Room / Location:  [facility]    Anesthesia Start: 0730 Anesthesia Stop: 1056    Procedure: MODIFIED ANTERIOR TOTAL HIP ARTHROPLASTY - RIGHT (Right: Hip) Diagnosis:       Primary osteoarthritis of right hip      (Primary osteoarthritis of right hip [M16.11])    Surgeons: Shaq High MD Provider: Tyrell Allen MD    Anesthesia Type: spinal ASA Status: 3            Anesthesia Type: spinal    Vitals  No vitals data found for the desired time range.          Post Anesthesia Care and Evaluation    Patient location during evaluation: PACU  Patient participation: complete - patient participated  Level of consciousness: awake and alert  Pain management: adequate    Airway patency: patent  Anesthetic complications: No anesthetic complications  PONV Status: none  Cardiovascular status: hemodynamically stable and acceptable  Respiratory status: nonlabored ventilation, acceptable and nasal cannula  Hydration status: acceptable

## 2024-01-18 NOTE — PLAN OF CARE
Goal Outcome Evaluation:  Plan of Care Reviewed With: patient        Progress: no change  Outcome Evaluation: Pt amb 250' with FWW and CGAx2. Pt demonstrated decreased weight shifting onto RLE with heavy reliance on BUE for support. Improved with VC. Pt navigated 10 steps with CGA and SPC. No knee buckling noted. Activity limited by elevated pain. RN aware and managing. Recommend d/c home with assist and HHPT when medically appropriate. Pt cleared to d/c today from PT standpoint. ADLs assessed and OT consult not warranted at this time.      Anticipated Discharge Disposition (PT): home with assist, home with home health

## 2024-01-19 ENCOUNTER — TREATMENT (OUTPATIENT)
Age: 64
End: 2024-01-19
Payer: COMMERCIAL

## 2024-01-19 DIAGNOSIS — Z47.89 ORTHOPEDIC AFTERCARE: ICD-10-CM

## 2024-01-19 DIAGNOSIS — M16.11 PRIMARY OSTEOARTHRITIS OF RIGHT HIP: Primary | ICD-10-CM

## 2024-01-19 PROCEDURE — 97110 THERAPEUTIC EXERCISES: CPT | Performed by: PHYSICAL THERAPIST

## 2024-01-19 PROCEDURE — 97530 THERAPEUTIC ACTIVITIES: CPT | Performed by: PHYSICAL THERAPIST

## 2024-01-19 PROCEDURE — 97161 PT EVAL LOW COMPLEX 20 MIN: CPT | Performed by: PHYSICAL THERAPIST

## 2024-01-23 ENCOUNTER — TELEPHONE (OUTPATIENT)
Dept: ORTHOPEDIC SURGERY | Facility: CLINIC | Age: 64
End: 2024-01-23

## 2024-01-23 NOTE — TELEPHONE ENCOUNTER
"    Caller: Tyrell Hirsch \"Jose\"    Relationship: Self    Best call back number: [phone] (home) [phone] (work)      What form or medical record are you requesting: TWO SETS OF FMLA PAPERWORK     Who is requesting this form or medical record from you: EMPLOYES FOR THE PATIENT EMPLOYER  AND FOR HIS WIFE'S  (MARVIN HIRSCH) EMPLOYER    How would you like to receive the form or medical records (pick-up, mail, fax): FAX  If fax, what is the fax number: THE FAX NUMBER FOR THE RESPECTIVE OFFICES SHOULD BE ON THE PAPERWORK     Timeframe paperwork needed: ASAP    Additional notes: PATIENT'S WIFE CALLED IN REGARDS TO FMLA FORMS. THEY WERE DROPPED OFF ABOUT TWO WEEKS BEFORE HIS SURGERY.  THE DATE OF HIS SX WAS JANUARY 18TH. TODAY HE GOT A CALL FROM HIS JOB. 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 AND ANOTHER SET FOR HIS WIFE WHO WORKS FOR [facility]     THE PATIENT AND HIS WIFE NEED THOSE TO BE SENT TO THEIR RESPECTIVE EMPLOYERS ASA.           "

## 2024-01-24 NOTE — PROGRESS NOTES
Physical Therapy Initial Evaluation and Plan of Care  [address] [facility], [address]  [place] [address]    Patient: Tyrell Guzmán   : 1960  Diagnosis/ICD-10 Code:  Primary osteoarthritis of right hip [M16.11]  Referring practitioner: Shaq High MD  Date of Initial Visit: 2024  Today's Date: 2024  Patient seen for 1 session         Visit Diagnoses:    ICD-10-CM ICD-9-CM   1. Primary osteoarthritis of right hip  M16.11 715.15   2. Orthopedic aftercare  Z47.89 V54.9     *Patient consented to PT2U visit in the home today and for future visits as needed due to inability to travel to a Physical Therapy clinic; will be transitioned to PT clinic in very near future as he is able    Subjective Questionnaire: LEFS: 15/80      Subjective Evaluation    History of Present Illness  Date of surgery: 2024  Mechanism of injury: Chronic right hip OA    Subjective comment: current pain 3-4/10; doing well after surgery; have been going up/down 14 steps to bedroom, doing light exercise and walking well with RW  Patient Occupation: [employer], nearing [employer];  service in the [employer] when younger   Precautions and Work Restrictions: Anterior hip precautions, patient understanding of them, but reviewed againQuality of life: good    Pain  Current pain ratin  Quality: discomfort and dull ache  Relieving factors: change in position, medications and rest  Aggravating factors: movement, lifting, stairs, standing, ambulation, prolonged positioning, sleeping and squatting  Progression: improved    Patient Goals  Patient goals for therapy: decreased pain, improved balance, increased motion, increased strength, independence with ADLs/IADLs, return to sport/leisure activities and return to work             Objective          Observations     Right Hip  Positive for incision.     Additional Hip Observation Details  Incision covered ; not observable today; patient reports that skin around  "incision looks good; understands s/s to look for that may indicate infection     Active Range of Motion     Additional Active Range of Motion Details  Right hip AROM WFL; in standing position patient able to flex right hip to clear foot, climb stairs etc    Has 14 steps to get to second story for bedroom; has handrail, keeps second walker at top of stairs    Strength/Myotome Testing     Additional Strength Details  Right hip strength grossly 3+/5    Tests     Additional Tests Details  - Homans    Clinical testing not indicated today due to recent surgery    TUG= 16\"          Assessment & Plan       Assessment  Impairments: abnormal gait, abnormal muscle firing, abnormal muscle tone, abnormal or restricted ROM, activity intolerance, impaired balance, impaired physical strength, lacks appropriate home exercise program, pain with function and weight-bearing intolerance   Functional limitations: carrying objects, lifting, sleeping, walking, pulling, pushing, uncomfortable because of pain, moving in bed, sitting, standing and stooping   Assessment details: Very pleasant 64 yo presents to PT s/p Right MAGGI on 1/18/24; he is doing well and is in minimal pain; he is ambulating very effectively with his RW, and is already climbing 14 steps to get to second floor bedroom; he is understanding of the hip precautions, and is understanding of total hip HEP through [employer]; all questions were answered and patient and wife voiced understanding of plan of care; he is an excellent candidate for PT to improve functional mobility and strength in order to RTW at [facility]   Prognosis: good    Goals  Plan Goals: 4 weeks:   1. IND with HEP  2. Patient to display AROM of right hip WFL for ADL's including transfers, ambulation, etc  3. Patient to improve LEFS by 1 MCID  4. Patient to perform TUG < 14\" with LRAD    8 weeks:  1. Patient to display right hip MMT strength of > 4/5 for functional mobility, work simulation tasks  2. " Patient to improve LEFS to > 50/80 for RTW  3. Patient to ambulate functional distances > 750' in 6 minute walk test for RTW  4. Patient to perform 4/4 on MCTSIB for safe balance while working    Plan  Therapy options: will be seen for skilled therapy services  Planned modality interventions: cryotherapy, TENS, thermotherapy (hydrocollator packs), ultrasound, high voltage pulsed current (pain management) and dry needling  Planned therapy interventions: manual therapy, neuromuscular re-education, postural training, soft tissue mobilization, spinal/joint mobilization, strengthening, stretching, therapeutic activities, joint mobilization, home exercise program, gait training, functional ROM exercises, flexibility, balance/weight-bearing training and body mechanics training  Frequency: 2x week  Duration in weeks: 12  Plan details: May extend POC up to 12 weeks as needed      Access Code: [email or web address]  URL: [email or web address]  Date: 01/24/2024  Prepared by: Shan Clements    Exercises  - Supine Single Leg Ankle Pumps  - 2 x daily - 7 x weekly - 2 sets - 10 reps  - Supine Quad Set  - 2 x daily - 7 x weekly - 2 sets - 10 reps - 5 hold  - Supine Isometric Hamstring Set  - 2 x daily - 7 x weekly - 2 sets - 10 reps - 5 hold  - Supine Gluteal Sets  - 2 x daily - 7 x weekly - 2 sets - 10 reps - 5 hold  - Supine Heel Slide with Strap  - 2 x daily - 7 x weekly - 2 sets - 10 reps - 5 hold  - Supine Knee Extension Strengthening  - 2 x daily - 7 x weekly - 2 sets - 10 reps  - Seated Long Arc Quad  - 2 x daily - 7 x weekly - 2 sets - 10 reps  - Seated Knee Flexion Slide  - 2 x daily - 7 x weekly - 2 sets - 10 reps      Timed:         Manual Therapy:         mins  59146;     Therapeutic Exercise:    15     mins  60001;     Neuromuscular Zeb:        mins  22001;    Therapeutic Activity:     10     mins  60579;     Gait Training:           mins  17176;     Ultrasound:          mins  80632;    Danoto                                    mins   51168  Self Care                            mins   95022  Canalith Repos         mins 02157      Un-Timed:  Electrical Stimulation:         mins  23429 ( );  Dry Needling          mins self-pay  Traction          mins 55087    Low Eval     30     Mins  60120  Mod Eval          Mins  13720  High Eval                            Mins  47946        Timed Treatment:   25   mins   Total Treatment:     55   mins          PT: MAL Clements PT     License Number: [id number]  Electronically signed by MAL Clements PT, 01/24/24, 8:19 AM EST    Certification Period: 1/24/2024 thru 4/22/2024  I certify that the therapy services are furnished while this patient is under my care.  The services outlined above are required by this patient, and will be reviewed every 90 days.         Physician Signature:__________________________________________________    PHYSICIAN: Shaq High MD  NPI: [id number]                                      DATE:      Please sign and return via fax to [email or web address] . Thank you, [facility].

## 2024-01-25 ENCOUNTER — HOSPITAL ENCOUNTER (OUTPATIENT)
Dept: CARDIOLOGY | Facility: HOSPITAL | Age: 64
Discharge: HOME | End: 2024-01-25
Payer: COMMERCIAL

## 2024-01-25 ENCOUNTER — TELEPHONE (OUTPATIENT)
Dept: PHYSICAL THERAPY | Facility: CLINIC | Age: 64
End: 2024-01-25

## 2024-01-25 ENCOUNTER — TELEPHONE (OUTPATIENT)
Dept: ORTHOPEDIC SURGERY | Facility: CLINIC | Age: 64
End: 2024-01-25
Payer: COMMERCIAL

## 2024-01-25 DIAGNOSIS — M79.89 SWELLING OF CALF: ICD-10-CM

## 2024-01-25 DIAGNOSIS — Z96.641 S/P TOTAL RIGHT HIP ARTHROPLASTY: ICD-10-CM

## 2024-01-25 DIAGNOSIS — M79.89 SWELLING OF CALF: Primary | ICD-10-CM

## 2024-01-25 LAB
BH CV LOWER VASCULAR LEFT COMMON FEMORAL PHASIC: NORMAL
BH CV LOWER VASCULAR LEFT COMMON FEMORAL SPONT: NORMAL
BH CV LOWER VASCULAR RIGHT COMMON FEMORAL COMPRESS: NORMAL
BH CV LOWER VASCULAR RIGHT COMMON FEMORAL PHASIC: NORMAL
BH CV LOWER VASCULAR RIGHT COMMON FEMORAL SPONT: NORMAL
BH CV LOWER VASCULAR RIGHT DISTAL FEMORAL COMPRESS: NORMAL
BH CV LOWER VASCULAR RIGHT DISTAL FEMORAL PHASIC: NORMAL
BH CV LOWER VASCULAR RIGHT DISTAL FEMORAL SPONT: NORMAL
BH CV LOWER VASCULAR RIGHT GASTRONEMIUS COMPRESS: NORMAL
BH CV LOWER VASCULAR RIGHT GREATER SAPH AK COMPRESS: NORMAL
BH CV LOWER VASCULAR RIGHT GREATER SAPH BK COMPRESS: NORMAL
BH CV LOWER VASCULAR RIGHT LESSER SAPH COMPRESS: NORMAL
BH CV LOWER VASCULAR RIGHT MID FEMORAL COMPRESS: NORMAL
BH CV LOWER VASCULAR RIGHT MID FEMORAL PHASIC: NORMAL
BH CV LOWER VASCULAR RIGHT MID FEMORAL SPONT: NORMAL
BH CV LOWER VASCULAR RIGHT PERONEAL AUGMENT: NORMAL
BH CV LOWER VASCULAR RIGHT PERONEAL COMPRESS: NORMAL
BH CV LOWER VASCULAR RIGHT POPLITEAL COMPRESS: NORMAL
BH CV LOWER VASCULAR RIGHT POPLITEAL PHASIC: NORMAL
BH CV LOWER VASCULAR RIGHT POPLITEAL SPONT: NORMAL
BH CV LOWER VASCULAR RIGHT POSTERIOR TIBIAL AUGMENT: NORMAL
BH CV LOWER VASCULAR RIGHT POSTERIOR TIBIAL COMPRESS: NORMAL
BH CV LOWER VASCULAR RIGHT PROFUNDA FEMORAL PHASIC: NORMAL
BH CV LOWER VASCULAR RIGHT PROFUNDA FEMORAL SPONT: NORMAL
BH CV LOWER VASCULAR RIGHT PROXIMAL FEMORAL COMPRESS: NORMAL
BH CV LOWER VASCULAR RIGHT PROXIMAL FEMORAL PHASIC: NORMAL
BH CV LOWER VASCULAR RIGHT PROXIMAL FEMORAL SPONT: NORMAL
BH CV LOWER VASCULAR RIGHT SAPHENOFEMORAL JUNCTION COMPRESS: NORMAL
BH CV LOWER VASCULAR RIGHT SAPHENOFEMORAL JUNCTION PHASIC: NORMAL
BH CV LOWER VASCULAR RIGHT SAPHENOFEMORAL JUNCTION SPONT: NORMAL

## 2024-01-25 PROCEDURE — 93971 EXTREMITY STUDY: CPT

## 2024-01-25 NOTE — TELEPHONE ENCOUNTER
I called Jose, he states he is having swelling in the right calf to the foot. He is having more discomfort at night. He has been elevating he leg higher than his heart and it is not helping. He is having pain in the calf and down to the foot. Can you please place an order for a doppler. I will try to get the patient scheduled today.  Dot Raygoza RT (R), ROT

## 2024-01-25 NOTE — TELEPHONE ENCOUNTER
Patient calling in today regarding recent total right hip replacement with Dr High on 1/18. Patient is experiencing swelling in the right foot and ankle. Please advise.

## 2024-01-26 ENCOUNTER — TREATMENT (OUTPATIENT)
Dept: PHYSICAL THERAPY | Facility: CLINIC | Age: 64
End: 2024-01-26
Payer: COMMERCIAL

## 2024-01-26 ENCOUNTER — TELEPHONE (OUTPATIENT)
Dept: ORTHOPEDIC SURGERY | Facility: CLINIC | Age: 64
End: 2024-01-26
Payer: COMMERCIAL

## 2024-01-26 DIAGNOSIS — Z47.89 ORTHOPEDIC AFTERCARE: ICD-10-CM

## 2024-01-26 DIAGNOSIS — M16.11 PRIMARY OSTEOARTHRITIS OF RIGHT HIP: Primary | ICD-10-CM

## 2024-01-26 DIAGNOSIS — Z96.641 S/P TOTAL RIGHT HIP ARTHROPLASTY: Primary | ICD-10-CM

## 2024-01-26 NOTE — PROGRESS NOTES
Physical Therapy Daily Progress Note     [facility]  [place] [address]      Patient: Tyrell Guzmán   : 1960  Diagnosis/ICD-10 Code:  Primary osteoarthritis of right hip [M16.11]  Referring practitioner: Shaq High MD  Date of Initial Visit: Type: THERAPY  Noted: 2024  Today's Date: 2024  Patient seen for 2 sessions           LEFS:     Patient reports: mainly experiencing R calf and foot pain that he feels is related to OA.      Objective          Passive Range of Motion     Right Hip   Flexion: 90 degrees with pain  Extension: Right hip passive extension: unable to achieve neutral.   External rotation (90/90): 20 degrees with pain  Internal rotation (90/90): 5 degrees with pain    Ambulation     Comments   RW with increased weightbearing through arms during right leg stance, forward trunk lean with short RW height, narrow base of support, right Trendelenburg gait      See Exercise, Manual, and Modality Logs for complete treatment.       Assessment/Plan  First time seeing patient.  Raised RW height to allow for more normal gait mechanics by reducing forward trunk lean and weightbearing through her arms.  He struggles to weight-bear through his right leg through the gait cycle instead relying on RW for weightbearing in addition to balance.  Severe limitation noted with gross right hip PROM, which patient notes was present prior to MAGGI.  Good tolerance to right hip PROM with no increased pain afterward.    Introduced calf strengthening and stretching to alleviate lower leg symptoms.  Modified existing HEP provided prior to today's visit to still include OKC quad strengthening and TKE retraining.        Timed:  Manual Therapy:    0     mins  00690;  Therapeutic Exercise:    10     mins  31657;     Neuromuscular Zeb:   10    mins  56899;    Therapeutic Activity:     20     mins  79930;     Gait Trainin     mins  47722;     Ultrasound:     0     mins  66695;     Electrical Stimulation:    0     mins  23290 ( );    Untimed:  Electrical Stimulation:    0     mins  84436 ( );  Mechanical Traction:    0     mins  96991;     Timed Treatment:   40   mins   Total Treatment:     40   mins    Desmond Ford PT  Physical Therapist

## 2024-01-26 NOTE — TELEPHONE ENCOUNTER
Spoke with patient's wife.  Provided results of duplex venous ultrasound performed yesterday.  No evidence of DVT.  I have encouraged continued elevation, ice, anti-inflammatories.  He is unable to tolerate NSAIDs.  He will continue with Tylenol and oxycodone. Encouraged compression stockings. He will keep us updated if symptoms worsen. He sees Jasmyn on 2/12/24 for post-op.

## 2024-01-31 ENCOUNTER — TREATMENT (OUTPATIENT)
Dept: PHYSICAL THERAPY | Facility: CLINIC | Age: 64
End: 2024-01-31
Payer: COMMERCIAL

## 2024-01-31 ENCOUNTER — TELEPHONE (OUTPATIENT)
Dept: ORTHOPEDIC SURGERY | Facility: CLINIC | Age: 64
End: 2024-01-31
Payer: COMMERCIAL

## 2024-01-31 DIAGNOSIS — Z47.89 ORTHOPEDIC AFTERCARE: ICD-10-CM

## 2024-01-31 DIAGNOSIS — M16.11 PRIMARY OSTEOARTHRITIS OF RIGHT HIP: Primary | ICD-10-CM

## 2024-01-31 NOTE — TELEPHONE ENCOUNTER
PATIENT HAD SX WITH DR. CARABALLO A WEEK AGO ON HIS RIGHT HIP. HE IS COMPLAINING OF PAIN IN HIS RIGHT FOOT/ANKLE. HIS ANKLE IS SWOLLEN. HE HAS BEEN ELEVATING AND IT HELPS SOME. HE IS WONDERING IF HIS ANKLE SHOWED ON PREVIOUS IMAGING. IF NOT, HE IS REQUESTING IMAGING BE ORDERED FOR HIS RIGHT ANKLE. HE IS UNSURE IF THE PAIN IS SURGERY RELATED.     HE ALSO STATED HE HAD A DOPPLER DONE AFTER SX AND IT WAS NEGATIVE FOR BLOOD CLOTS.     CALL BACK # [phone]

## 2024-01-31 NOTE — PROGRESS NOTES
Physical Therapy Daily Progress Note    [address]  [place] [address]      Patient: Tyrell Guzmán   : 1960  Diagnosis/ICD-10 Code:  Primary osteoarthritis of right hip [M16.11]  Referring practitioner: Shaq High MD  Date of Initial Visit: Type: THERAPY  Noted: 2024  Today's Date: 2024  Patient seen for 3 sessions           Patient reports: moving his R LE easier during transfers.      Objective   See Exercise, Manual, and Modality Logs for complete treatment.       Assessment/Plan  Continued right hip PROM into flexion, ER, and abduction.  Progressed posterior chain strengthening to include bridges and mini squats.  Significant cueing required during squat to bias hips and avoid excessive anterior tibial translation.  No significant pain or fatigue noted during or after treatment.            Timed:  Manual Therapy:    10     mins  08138;  Therapeutic Exercise:    20     mins  84891;     Neuromuscular Zeb:   0    mins  05608;    Therapeutic Activity:     10     mins  73586;     Gait Trainin     mins  37214;     Ultrasound:     0     mins  93121;    Electrical Stimulation:    0     mins  42158 ( );    Untimed:  Electrical Stimulation:    0     mins  75076 ( );  Mechanical Traction:    0     mins  27102;     Timed Treatment:   40   mins   Total Treatment:     40   mins    Desmond Ford PT  Physical Therapist

## 2024-01-31 NOTE — TELEPHONE ENCOUNTER
I spoke with Jasmyn about this and she will see the patient tomorrow. I called the patient and scheduled him for 1:00 tomorrow.  Dot Raygoza RT (R), ROT

## 2024-02-01 ENCOUNTER — OFFICE VISIT (OUTPATIENT)
Dept: ORTHOPEDIC SURGERY | Facility: CLINIC | Age: 64
End: 2024-02-01
Payer: COMMERCIAL

## 2024-02-01 VITALS
SYSTOLIC BLOOD PRESSURE: 132 MMHG | HEIGHT: 69 IN | BODY MASS INDEX: 44.08 KG/M2 | DIASTOLIC BLOOD PRESSURE: 86 MMHG | WEIGHT: 297.62 LBS

## 2024-02-01 DIAGNOSIS — Z96.641 S/P TOTAL RIGHT HIP ARTHROPLASTY: Primary | ICD-10-CM

## 2024-02-01 DIAGNOSIS — M25.471 PAIN AND SWELLING OF ANKLE, RIGHT: ICD-10-CM

## 2024-02-01 DIAGNOSIS — M25.571 PAIN AND SWELLING OF ANKLE, RIGHT: ICD-10-CM

## 2024-02-01 RX ORDER — MELOXICAM 15 MG/1
15 TABLET ORAL DAILY PRN
Qty: 30 TABLET | Refills: 0 | Status: SHIPPED | OUTPATIENT
Start: 2024-02-01

## 2024-02-01 NOTE — PROGRESS NOTES
[facility] Orthopaedic Surgery Clinic Note        Subjective     CC: Pain of the Right Ankle      HPI    Tyrell Guzmán is a 63 y.o. male.  Patient presents today for evaluation of right ankle pain and swelling following right modified anterior MAGGI on 1/18/2024 by Dr. High.  Patient had called into the clinic last week concern for the swelling to his right lower leg.  He had a venous duplex Doppler that was negative for DVT.  He is here today after calling the clinic yesterday concerned that he might have a fracture in his ankle due to pain and swelling.  Additionally he does note at times the pain travels from his right buttock down the posterior thigh, lower leg into his foot.    Pain scale: 6/10.  Associated symptoms pain and swelling.  Pain is worse with walking, standing, sleeping, lying on the affected side.  Ice and medication help.  He is attending outpatient therapy.  Patient is using a walker to assist with ambulation.    Overall, patient's symptoms are improved.    ROS:    Constiutional:Pt denies fever, chills, nausea, or vomiting.  MSK:as above        Objective      Past Medical History  Past Medical History:   Diagnosis Date    Abnormal EKG     Bilateral lower extremity edema.     Colon cancer     colectomy    CPAP (continuous positive airway pressure) dependence     Diabetes mellitus     Enlarged prostate     Hip arthrosis Rd    History of chemotherapy     x6 months.    History of radiation therapy 06/18/2020    prostate bed s/p prostatectomy    Hypertension     Obesity     Prostate cancer     Sleep apnea     Wears glasses      Social History     Socioeconomic History    Marital status:    Tobacco Use    Smoking status: Never     Passive exposure: Past    Smokeless tobacco: Never   Vaping Use    Vaping Use: Never used   Substance and Sexual Activity    Alcohol use: Yes     Alcohol/week: 3.0 standard drinks of alcohol     Types: 2 Cans of beer, 1 Drinks containing 0.5 oz of alcohol per week  "   Drug use: No    Sexual activity: Yes     Partners: Female     Birth control/protection: None          Physical Exam  /86   Ht 174 cm (68.5\")   Wt 135 kg (297 lb 9.9 oz)   BMI 44.59 kg/m²     Body mass index is 44.59 kg/m².    Patient is well nourished and well developed.        Ortho Exam  Integument:   Right hip: Incision is healing well without redness, warmth, drainage or signs of infection.    Lower Extremity:   Right hip:    Tenderness:  Generalized pain typical following MAGGI    Swelling:  Positive    Crepitus:  None    Range of motion:  External Rotation: 30°       Internal Rotation: 30°       Flexion:  100°       Extension:  0°    Deformities:  None  Functional testing: Negative Stinchfield    No leg length discrepancy    Right ankle  Skin: Grossly intact without any redness or warmth.  Positive 2+ soft tissue swelling noted lower leg, ankle and foot.  Tenderness: No tenderness to the ankle on exam today.  Calf is negative.  Motor/sensory: Grossly intact L2-S1.      Imaging/Labs/EMG Reviewed:  Ordered right ankle plain films.  Imaging read/interpreted by Dr. Feliciano.    Imaging Results (Last 24 Hours)       Procedure Component Value Units Date/Time    XR Ankle 3+ View Right [[id number]] Resulted: 02/01/24 1320     Updated: 02/01/24 1320    Narrative:      Indication: Right ankle pain    Comparison: No prior xrays are available for comparison      Impression:           Right ankle 3 views: Radiographs demonstrate no acute bony injury or   fracture.  Symmetric ankle mortise with mild osteophyte formation.    Diffuse soft tissue edema is identified.            Duplex venous Doppler from 1/25/2024   -No evidence of deep or superficial venous thrombus within the right lower extremity.       Assessment:  1. S/P total right hip arthroplasty    2. Pain and swelling of ankle, right        Plan:  Status post right modified anterior MAGGI, stable.  Right ankle pain and swelling--discussed the importance of " icing, elevation, range of motion of the ankle knee as well as continued PT to the hip to help with inflammation and swelling.  Reviewed ankle imaging with patient.  Continue with outpatient PT.  Discussed use of compression socks.  Patient does note intermittent pain traveling from buttock region down posterior leg to foot--skin irritation sciatic nerve.  Hope this will improve with decreasing swelling to leg.  Will continue with observation for now  Patient has used meloxicam in the past--prescribed meloxicam today to help with inflammation and swelling.  Will continue taking acetaminophen as directed.  Continue with ASA as directed for DVT prophylaxis.  Will keep follow-up appointment on 2/12/2024 with me.  Patient will require imaging of right hip at that appointment.  Instructed patient to contact the clinic if he has any change or worsening of his symptoms.  Questions and concerns answered.    History, exam and imaging discussed with Dr. Feliciano, who agrees with the above assessment and plan.      Jasmyn Reynolds PA-C  02/01/24  13:38 EST      Dictated Utilizing Dragon Dictation.

## 2024-02-02 ENCOUNTER — TREATMENT (OUTPATIENT)
Dept: PHYSICAL THERAPY | Facility: CLINIC | Age: 64
End: 2024-02-02
Payer: COMMERCIAL

## 2024-02-02 DIAGNOSIS — M16.11 PRIMARY OSTEOARTHRITIS OF RIGHT HIP: Primary | ICD-10-CM

## 2024-02-02 DIAGNOSIS — Z47.89 ORTHOPEDIC AFTERCARE: ICD-10-CM

## 2024-02-02 NOTE — PROGRESS NOTES
Physical Therapy Daily Progress Note    [address] [facility], [address]  [place] [address]      Patient: Tyrell Guzmán   : 1960  Diagnosis/ICD-10 Code:  Primary osteoarthritis of right hip [M16.11]  Referring practitioner: Shaq High MD  Date of Initial Visit: Type: THERAPY  Noted: 2024  Today's Date: 2024  Patient seen for 4 sessions           Patient reports: no increased soreness or pain after last visit.       Objective          Strength/Myotome Testing     Left Hip   Planes of Motion   Flexion: 4+  External rotation: 4+    Right Hip   Planes of Motion   Flexion: 4+  Extension: 2-  Abduction: 2-  External rotation: 4-    Left Knee   Flexion: 5  Extension: 5    Right Knee   Flexion: 4+  Extension: 4      See Exercise, Manual, and Modality Logs for complete treatment.       Assessment/Plan  Assessed leg strength for the first time, with marked right lateral and posterior hip weakness noted.  This was apparent during gait assessment using SPC in the left arm, as his right leg strength was inadequate to support him and unilateral weightbearing.  Progressed table and weightbearing hip strength exercise with significant fatigue but no significant increase in pain.            Timed:  Manual Therapy:    0     mins  80955;  Therapeutic Exercise:    15     mins  82297;     Neuromuscular Zeb:   10    mins  85491;    Therapeutic Activity:     20     mins  14687;     Gait Trainin     mins  40696;     Ultrasound:     0     mins  40704;    Electrical Stimulation:    0     mins  94464 ( );    Untimed:  Electrical Stimulation:    0     mins  88986 (MC );  Mechanical Traction:    0     mins  65811;     Timed Treatment:   0   mins   Total Treatment:     0   mins    Desmond Ford PT  Physical Therapist

## 2024-02-05 ENCOUNTER — TELEPHONE (OUTPATIENT)
Dept: ORTHOPEDIC SURGERY | Facility: CLINIC | Age: 64
End: 2024-02-05
Payer: COMMERCIAL

## 2024-02-05 ENCOUNTER — OFFICE VISIT (OUTPATIENT)
Dept: ORTHOPEDIC SURGERY | Facility: CLINIC | Age: 64
End: 2024-02-05
Payer: COMMERCIAL

## 2024-02-05 ENCOUNTER — TREATMENT (OUTPATIENT)
Dept: PHYSICAL THERAPY | Facility: CLINIC | Age: 64
End: 2024-02-05
Payer: COMMERCIAL

## 2024-02-05 DIAGNOSIS — M16.11 PRIMARY OSTEOARTHRITIS OF RIGHT HIP: Primary | ICD-10-CM

## 2024-02-05 DIAGNOSIS — Z96.641 S/P TOTAL RIGHT HIP ARTHROPLASTY: Primary | ICD-10-CM

## 2024-02-05 DIAGNOSIS — Z47.89 ORTHOPEDIC AFTERCARE: ICD-10-CM

## 2024-02-05 PROCEDURE — 97110 THERAPEUTIC EXERCISES: CPT | Performed by: PHYSICAL THERAPIST

## 2024-02-05 PROCEDURE — 97530 THERAPEUTIC ACTIVITIES: CPT | Performed by: PHYSICAL THERAPIST

## 2024-02-05 PROCEDURE — 97112 NEUROMUSCULAR REEDUCATION: CPT | Performed by: PHYSICAL THERAPIST

## 2024-02-05 PROCEDURE — 99024 POSTOP FOLLOW-UP VISIT: CPT | Performed by: PHYSICIAN ASSISTANT

## 2024-02-05 NOTE — TELEPHONE ENCOUNTER
Spoke with patient. He states that last night he noticed the bottom of his incision was red and warm to the touch. States this morning it appears improved, but is still slightly red and warm and states there is some swelling. Patient denies any drainage from the incision site and denies fever, chills, and night sweats. Asked patient to send a picture of the incision through MyChart so it can be assessed by one of our PAs.

## 2024-02-05 NOTE — TELEPHONE ENCOUNTER
Please see above note and attached image of incision in patient's media.     Thank you,   Tigist KAUFFMAN

## 2024-02-05 NOTE — PROGRESS NOTES
Physical Therapy Daily Progress Note    [address] [facility], [address]  [place] [address]      Patient: Tyrell Guzmán   : 1960  Diagnosis/ICD-10 Code:  Primary osteoarthritis of right hip [M16.11]  Referring practitioner: Shaq High MD  Date of Initial Visit: Type: THERAPY  Noted: 2024  Today's Date: 2024  Patient seen for 5 sessions           Patient reports: increased groin soreness after last visit. Noticed a red fluid pocket distal to his incision last night and was seen by ortho, who ruled out infection.      Objective   See Exercise, Manual, and Modality Logs for complete treatment.       Assessment/Plan  Modified exercises to reduce groin soreness and focus muscle activity to glutes.  Increased reps with most exercises, with patient tolerating activities well as long as he was provided rest breaks.  No groin soreness reported after treatment.            Timed:  Manual Therapy:    0     mins  69740;  Therapeutic Exercise:    19     mins  39163;     Neuromuscular Zeb:   10    mins  39121;    Therapeutic Activity:     23     mins  59830;     Gait Trainin     mins  77337;     Ultrasound:     0     mins  37076;    Electrical Stimulation:    0     mins  64223 ( );    Untimed:  Electrical Stimulation:    0     mins  77404 ( );  Mechanical Traction:    0     mins  83403;     Timed Treatment:   52   mins   Total Treatment:     52   mins    Desmond Ford PT  Physical Therapist

## 2024-02-05 NOTE — TELEPHONE ENCOUNTER
PATIENT HAD SX WITH DR. CARABALLO ON HIS RIGHT HIP ON 01/18/24. HE SAID HIS INCISION SITE TOWARDS THE BOTTOM IS RED AND WARM TO THE TOUCH. SOME OF THE REDNESS HAS GONE AWAY SINCE LAST NIGHT, BUT HE IS CONCERNED AND WOULD LIKE TO DISCUSS IT.     BEST CALL BACK # [phone]

## 2024-02-05 NOTE — PROGRESS NOTES
[facility] Orthopaedic Surgery Clinic Note        Subjective     Post-op Follow-up (4 day follow up - 2.5 weeks S/P MODIFIED ANTERIOR TOTAL HIP ARTHROPLASTY - RIGHT (DOS: 1/18/24))       BISMARK Guzmán is a 63 y.o. male.  Patient brought in after contacting the clinic (today) regarding redness and warmth to inferior aspect of the incision. Symptoms started yesterday.    Pain scale: 5/10. Using walker to assist with ambulation. Attending OPPT.    Patient denies any fever, chills, night sweats or other constitutional symptoms.          Objective      Physical Exam  There were no vitals taken for this visit.    There is no height or weight on file to calculate BMI.        Ortho Exam  Integument:   Right hip: Incision is healing well with redness, warmth, swelling to inferior incision--consistent with seroma. No drainage or signs of infection.    Lower Extremity:   Right hip:    Tenderness:  Generalized pain typical following MAGGI    Swelling:  Inferior and posterior aspect incision    Crepitus:  None    Range of motion:  External Rotation: 30°       Internal Rotation: 30°       Flexion:  100°       Extension:  0°        Imaging Reviewed:  Ordered right hip plain films.  Imaging read/interpreted by Dr. High.    Imaging Results (Last 24 Hours)       Procedure Component Value Units Date/Time    XR Hip With or Without Pelvis 2 - 3 View Right [[id number]] Resulted: 02/05/24 1435     Updated: 02/05/24 1435    Narrative:      Right Hip Radiographs  Indication: status-post right total hip arthroplasty  Views: low AP pelvis and lateral of the right hip    Comparison: no change compared to prior study, 1/18/2024    Findings:   The components are well aligned, with no signs of loosening or failure.               Assessment:  1. S/P total right hip arthroplasty        Plan:  Status post right modified anterior MAGGI with seroma in inferior aspect incision area.  Reviewed imaging with patient.  Continue with outpatient  PT.  Recommend compression shorts, icing to lateral hip  Continue with current pain management regimen.  Continue with meloxicam.  Continue with ASA as directed for DVT prophylaxis.  Follow up 2/12/2024 as previously directed. No imaging needed.  Questions and concerns answered.      Jasmyn Reynolds PA-C  02/05/24  14:39 EST      Dictated Utilizing Dragon Dictation.

## 2024-02-07 ENCOUNTER — TREATMENT (OUTPATIENT)
Dept: PHYSICAL THERAPY | Facility: CLINIC | Age: 64
End: 2024-02-07
Payer: COMMERCIAL

## 2024-02-07 DIAGNOSIS — Z47.89 ORTHOPEDIC AFTERCARE: ICD-10-CM

## 2024-02-07 DIAGNOSIS — M16.11 PRIMARY OSTEOARTHRITIS OF RIGHT HIP: Primary | ICD-10-CM

## 2024-02-08 ENCOUNTER — OFFICE VISIT (OUTPATIENT)
Dept: ORTHOPEDIC SURGERY | Facility: CLINIC | Age: 64
End: 2024-02-08
Payer: COMMERCIAL

## 2024-02-08 DIAGNOSIS — M96.842 SEROMA OF MUSCULOSKELETAL STRUCTURE AFTER MUSCULOSKELETAL SYSTEM PROCEDURE: ICD-10-CM

## 2024-02-08 DIAGNOSIS — Z96.641 S/P TOTAL RIGHT HIP ARTHROPLASTY: Primary | ICD-10-CM

## 2024-02-08 PROCEDURE — 99024 POSTOP FOLLOW-UP VISIT: CPT | Performed by: PHYSICIAN ASSISTANT

## 2024-02-08 RX ORDER — CEPHALEXIN 500 MG/1
500 CAPSULE ORAL 3 TIMES DAILY
Qty: 30 CAPSULE | Refills: 0 | Status: SHIPPED | OUTPATIENT
Start: 2024-02-08 | End: 2024-02-18

## 2024-02-08 NOTE — PROGRESS NOTES
[facility] [place] [facility] Clinic Note        Subjective     Post-op (3 days postop; 3 weeks S/P MODIFIED ANTERIOR TOTAL HIP ARTHROPLASTY - RIGHT (DOS: 1/18/24))       BISMARK Guzmán is a 63 y.o. male.  Patient was brought to the clinic today after his physical therapist contacted our clinic yesterday evening noting that there was drainage coming from the inferior portion of the incision.  Per patient reports it started during therapy yesterday when he was doing exercises on his right side.  PT dressed the area.    Pain scale: 3/10 max.  Here today using a walker to assist with ambulation.  Denies any numbness or tingling into the extremity.      Patient denies any fever, chills, night sweats or other constitutional symptoms.          Objective      Physical Exam  There were no vitals taken for this visit.    There is no height or weight on file to calculate BMI.        Ortho Exam  Integument:   Right hip: Seroma noted to the inferior portion of the incision is now draining serosanguineous fluid.  Remaining incision is without redness, warmth, drainage or evidence of infection.  With compression of the area positive serosanguineous and hematoma drainage.    Lower Extremity:   Right hip:    Tenderness:  Patient describes soreness along the incision but no groin or inguinal pain    Swelling:  Minimal    Crepitus:  None    Range of motion:  External Rotation: 30°       Internal Rotation: 30°       Flexion:  100°       Extension:  0°    Deformities:  None  Functional testing: Negative Stinchfield        Imaging Reviewed:  No new imaging today.      Assessment:  1. S/P total right hip arthroplasty    2. Seroma of musculoskeletal structure after musculoskeletal system procedure        Plan:  Status post right modified anterior MAGGI with draining seroma.  This will need to be watched closely.  Seroma/area was decompressed.  Applied Steri-Strips followed by Optifoam dressing, 4x4s, ABD and TP tape. Lastly an  Ace-wrap was applied. Patient to leave on until he returns to the clinic tomorrow.  Prophylactically placed on 10-day course of Keflex.  To be NPO after midnight tonight in case he needs to be taken to surgery tomorrow.  Recommend OTC pain medication as needed.  Continue with ASA as directed for DVT prophylaxis.  Follow up with Danielle Vazquez PA-C tomorrow morning. Dr. High will be present as well.  Questions and concerns answered.    Patient was also examined by Dr. High and he agrees with the above assessment and plan.      Jasmyn Reynolds PA-C  02/08/24  12:56 EST      Dictated Utilizing Dragon Dictation.

## 2024-02-09 ENCOUNTER — OFFICE VISIT (OUTPATIENT)
Dept: ORTHOPEDIC SURGERY | Facility: CLINIC | Age: 64
End: 2024-02-09
Payer: COMMERCIAL

## 2024-02-09 DIAGNOSIS — M96.842 SEROMA OF MUSCULOSKELETAL STRUCTURE AFTER MUSCULOSKELETAL SYSTEM PROCEDURE: ICD-10-CM

## 2024-02-09 DIAGNOSIS — Z96.641 S/P TOTAL RIGHT HIP ARTHROPLASTY: Primary | ICD-10-CM

## 2024-02-09 PROCEDURE — 99024 POSTOP FOLLOW-UP VISIT: CPT

## 2024-02-09 NOTE — PROGRESS NOTES
[facility] [place] [facility] Office Follow Up       Office Follow Up Visit       Patient Name: Tyrell Guzmán    Chief Complaint:   Chief Complaint   Patient presents with    Post-op     3 weeks S/P MODIFIED ANTERIOR TOTAL HIP ARTHROPLASTY - RIGHT (DOS: 1/18/24)       Referring Physician: No ref. provider found    History of Present Illness:   Tyrell Guzmán returns to clinic today for recheck of incision 3 weeks s/p right modified anterior total hip arthroplasty with Dr. High.  He was seen yesterday in our office by Jasmyn and Dr. High due to some incisional drainage.  No fever, chills, night sweats at that time.    He appeared to have a seroma at the inferior portion of his incision.  The incision was dressed with Optifoam, 4 x 4's, ABD, and TP tape. Ace-wrap surrounding. Prescribed 10-day course of Keflex. Encouraged to follow-up today for recheck. NPO after midnight.    He has not noticed any drainage or saturation of the dressing overnight.  Continues to have no other symptoms.      Subjective     Review of Systems   Constitutional: Negative.  Negative for chills, fatigue and fever.   HENT: Negative.  Negative for congestion and dental problem.    Eyes: Negative.  Negative for blurred vision.   Respiratory: Negative.  Negative for shortness of breath.    Cardiovascular: Negative.  Negative for leg swelling.   Gastrointestinal: Negative.  Negative for abdominal pain.   Endocrine: Negative.  Negative for polyuria.   Genitourinary: Negative.  Negative for difficulty urinating.   Musculoskeletal:  Positive for arthralgias.   Skin: Negative.    Allergic/Immunologic: Negative.    Neurological: Negative.    Hematological: Negative.  Negative for adenopathy.   Psychiatric/Behavioral: Negative.  Negative for behavioral problems.         I have reviewed and updated the following portions of the patient's history and review of systems: allergies,  current medications, past family history, past medical history, past social history, past surgical history and problem list.    Medications:   Current Outpatient Medications:     aspirin (ASPIR) 81 MG EC tablet, Take 1 tablet by mouth 2 (Two) Times a Day., Disp: 60 tablet, Rfl: 0    B Complex Vitamins (VITAMIN B COMPLEX PO), Take 1 tablet by mouth Daily., Disp: , Rfl:     bumetanide (BUMEX) 0.5 MG tablet, TAKE 1 TABLET DAILY (Patient taking differently: Take 1 tablet by mouth Daily.), Disp: 90 tablet, Rfl: 3    cephalexin (KEFLEX) 500 MG capsule, Take 1 capsule by mouth 3 (Three) Times a Day for 10 days., Disp: 30 capsule, Rfl: 0    Chlorhexidine Gluconate 4 % solution, Shower with hibiclens solution as directed for 5 days prior to surgery., Disp: 237 mL, Rfl: 0    cholecalciferol (VITAMIN D3) 1.25 MG (84128 UT) capsule, Take 1 capsule by mouth Every 7 (Seven) Days., Disp: , Rfl:     losartan (COZAAR) 100 MG tablet, Take 1 tablet by mouth Daily., Disp: , Rfl:     meloxicam (MOBIC) 15 MG tablet, Take 1 tablet by mouth Daily As Needed for Moderate Pain or Mild Pain., Disp: 30 tablet, Rfl: 0    Multiple Vitamins-Minerals (CENTRUM SILVER 50+MEN) tablet, Take 1 tablet by mouth Daily., Disp: , Rfl:     rosuvastatin (CRESTOR) 5 MG tablet, TAKE 1 TABLET DAILY (Patient taking differently: Take 1 tablet by mouth Daily.), Disp: 90 tablet, Rfl: 3    triamterene-hydrochlorothiazide (MAXZIDE) 75-50 MG per tablet, TAKE 1 TABLET DAILY (Patient taking differently: Take 1 tablet by mouth Daily.), Disp: 90 tablet, Rfl: 3    Trulicity 4.5 MG/0.5ML solution pen-injector, Inject 0.5 mL as directed 1 (One) Time Per Week. Saturday, Disp: , Rfl:     Allergies:   Allergies   Allergen Reactions    Lisinopril Cough         Objective      Vital Signs: There were no vitals filed for this visit.    Ortho Exam:  General: no acute distress, comfortable  Vitals reviewed in chart    Musculoskeletal Exam:    SIDE: Right hip  Seroma at inferior portion  of incision appears to be improving with no new drainage noted. Steri-strips intact. No redness, warmth, or pus.  No evidence of septic joint      Results Review:  XR Hip With or Without Pelvis 2 - 3 View Right  Right Hip Radiographs  Indication: status-post right total hip arthroplasty  Views: low AP pelvis and lateral of the right hip    Comparison: no change compared to prior study, 1/18/2024    Findings:   The components are well aligned, with no signs of loosening or failure.          Assessment / Plan      Assessment:   Diagnoses and all orders for this visit:    1. S/P total right hip arthroplasty (Primary)    2. Seroma of musculoskeletal structure after musculoskeletal system procedure        Quality Metrics:   BMI:   Class 3 Severe Obesity (BMI >=40). Obesity-related health conditions include the following: none.     Tobacco:   Tyrell Guzmán  reports that he has never smoked. He has been exposed to tobacco smoke. He has never used smokeless tobacco..        Plan:  Seroma at inferior portion of incision appears to be improving after decompression and dressing yesterday.  Steri-strips intact. We will redress with 4x4s, ABD and TP tape. New Ace-wrap applied by Dr. High.  He will continue with 10-day course of Keflex.  He will monitor closely at home for any new drainage or saturation of dressing.  He will continue with over-the-counter anti-inflammatories as needed.  Continue with aspirin for DVT prophylaxis.  He will follow-up with Dr. High on Monday morning.      Follow Up:   Monday 2/12 with Dr. Lennox Vazquez PA-C  [facility]    Dictated using Dragon Speech Recognition.

## 2024-02-12 ENCOUNTER — OFFICE VISIT (OUTPATIENT)
Dept: ORTHOPEDIC SURGERY | Facility: CLINIC | Age: 64
End: 2024-02-12
Payer: COMMERCIAL

## 2024-02-12 ENCOUNTER — TELEPHONE (OUTPATIENT)
Dept: ORTHOPEDIC SURGERY | Facility: CLINIC | Age: 64
End: 2024-02-12

## 2024-02-12 DIAGNOSIS — Z96.641 S/P TOTAL RIGHT HIP ARTHROPLASTY: Primary | ICD-10-CM

## 2024-02-12 DIAGNOSIS — M96.842 SEROMA OF MUSCULOSKELETAL STRUCTURE AFTER MUSCULOSKELETAL SYSTEM PROCEDURE: ICD-10-CM

## 2024-02-12 PROCEDURE — 99024 POSTOP FOLLOW-UP VISIT: CPT | Performed by: ORTHOPAEDIC SURGERY

## 2024-02-19 ENCOUNTER — OFFICE VISIT (OUTPATIENT)
Dept: ORTHOPEDIC SURGERY | Facility: CLINIC | Age: 64
End: 2024-02-19
Payer: COMMERCIAL

## 2024-02-19 DIAGNOSIS — M96.842 SEROMA OF MUSCULOSKELETAL STRUCTURE AFTER MUSCULOSKELETAL SYSTEM PROCEDURE: ICD-10-CM

## 2024-02-19 DIAGNOSIS — Z96.641 S/P TOTAL RIGHT HIP ARTHROPLASTY: Primary | ICD-10-CM

## 2024-02-19 PROCEDURE — 99024 POSTOP FOLLOW-UP VISIT: CPT | Performed by: ORTHOPAEDIC SURGERY

## 2024-02-19 NOTE — PROGRESS NOTES
[facility] [place] [facility] Note    Subjective     Chief Complaint   Patient presents with    Post-op       1 WEEK FOLLOW UP -4.5 weeks S/P MODIFIED ANTERIOR TOTAL HIP ARTHROPLASTY - RIGHT (DOS: 1/18/24            HPI    It has been 1  week(s) since Mr. Guzmán's last visit. He returns to clinic today for postoperative follow-up of right hip arthroplasty. The issue has been ongoing for 1 year(s). He rates his pain a 2/10 on the pain scale. Previous/current treatments: NSAIDS and physical therapy. Overall, he is doing better.  No drainage from his wound.  Pain following his total hip arthroplasty is 100% improved compared to his preoperative symptoms.  Ambulating with the aid of a walker.  We have held physical therapy in light of his seroma.    I have reviewed the following portions of the patient's history and agree with: History of Present Illness and Review of Systems    Patient Active Problem List   Diagnosis    Acute bronchitis    Obstructive apnea    Essential hypertension    Bilateral lower extremity edema.    Abnormal EKG    Obesity    History of colon cancer    Prostate cancer    Diabetes    Status post prostatectomy, lap robotic    Adenomatous duodenal polyp    Eczema    Encounter for drug therapy    History of adenocarcinoma of prostate    Primary osteoarthritis of right hip    Raised prostate specific antigen    Type 2 diabetes mellitus with hyperglycemia, without long-term current use of insulin    Hypertension    Morbid obesity with body mass index (BMI) of 45.0 to 49.9 in adult    Obesity    Obstructive sleep apnea    Malignant tumor of prostate    Degenerative arthritis of hip    Hyperlipidemia    Pain of right hip joint    Morbid obesity with body mass index (BMI) of 40.0 or higher    High prostate specific antigen (PSA)    Status post total hip replacement, right     Past Medical History:   Diagnosis Date    Abnormal EKG     Bilateral lower extremity edema.     Colon cancer      colectomy    CPAP (continuous positive airway pressure) dependence     Diabetes mellitus     Enlarged prostate     Hip arthrosis Rd    History of chemotherapy     x6 months.    History of radiation therapy 06/18/2020    prostate bed s/p prostatectomy    Hypertension     Obesity     Prostate cancer     Sleep apnea     Wears glasses       Past Surgical History:   Procedure Laterality Date    COLECTOMY PARTIAL / TOTAL      COLONOSCOPY  2017    PROSTATECTOMY N/A 01/10/2020    Procedure: PROSTATECTOMY LAPAROSCOPIC WITH DAVINCI ROBOT;  Surgeon: Andrés Dietz MD;  Location:  [facility];  Service: DaVinci    TOTAL HIP ARTHROPLASTY Right 1/18/2024    Procedure: MODIFIED ANTERIOR TOTAL HIP ARTHROPLASTY - RIGHT;  Surgeon: Shaq High MD;  Location:  [facility];  Service: Orthopedics;  Laterality: Right;    TUMOR REMOVAL Right 2016    Shoulder- Benign    VASECTOMY        Family History   Problem Relation Age of Onset    Heart disease Mother     Diabetes Father     Stroke Father     Cancer Father     Heart disease Father     Hypertension Father     Diabetes Sister     Heart attack Sister     Diabetes Sister     Diabetes Sister     Diabetes Brother     Hyperlipidemia Other      Social History     Socioeconomic History    Marital status:    Tobacco Use    Smoking status: Never     Passive exposure: Past    Smokeless tobacco: Never   Vaping Use    Vaping Use: Never used   Substance and Sexual Activity    Alcohol use: Yes     Alcohol/week: 3.0 standard drinks of alcohol     Types: 2 Cans of beer, 1 Drinks containing 0.5 oz of alcohol per week    Drug use: No    Sexual activity: Yes     Partners: Female     Birth control/protection: None      Current Outpatient Medications on File Prior to Visit   Medication Sig Dispense Refill    aspirin (ASPIR) 81 MG EC tablet Take 1 tablet by mouth 2 (Two) Times a Day. 60 tablet 0    B Complex Vitamins (VITAMIN B COMPLEX PO) Take 1 tablet by mouth Daily.      bumetanide (BUMEX) 0.5  MG tablet TAKE 1 TABLET DAILY (Patient taking differently: Take 1 tablet by mouth Daily.) 90 tablet 3    Chlorhexidine Gluconate 4 % solution Shower with hibiclens solution as directed for 5 days prior to surgery. 237 mL 0    cholecalciferol (VITAMIN D3) 1.25 MG (69663 UT) capsule Take 1 capsule by mouth Every 7 (Seven) Days.      losartan (COZAAR) 100 MG tablet Take 1 tablet by mouth Daily.      meloxicam (MOBIC) 15 MG tablet Take 1 tablet by mouth Daily As Needed for Moderate Pain or Mild Pain. 30 tablet 0    Multiple Vitamins-Minerals (CENTRUM SILVER 50+MEN) tablet Take 1 tablet by mouth Daily.      rosuvastatin (CRESTOR) 5 MG tablet TAKE 1 TABLET DAILY (Patient taking differently: Take 1 tablet by mouth Daily.) 90 tablet 3    triamterene-hydrochlorothiazide (MAXZIDE) 75-50 MG per tablet TAKE 1 TABLET DAILY (Patient taking differently: Take 1 tablet by mouth Daily.) 90 tablet 3    Trulicity 4.5 MG/0.5ML solution pen-injector Inject 0.5 mL as directed 1 (One) Time Per Week. Saturday      [] cephalexin (KEFLEX) 500 MG capsule Take 1 capsule by mouth 3 (Three) Times a Day for 10 days. 30 capsule 0     No current facility-administered medications on file prior to visit.      Allergies   Allergen Reactions    Lisinopril Cough        Review of Systems   Constitutional: Negative.    HENT: Negative.     Eyes: Negative.    Respiratory: Negative.     Cardiovascular: Negative.    Gastrointestinal: Negative.    Endocrine: Negative.    Genitourinary: Negative.    Musculoskeletal:  Positive for arthralgias.   Skin: Negative.    Allergic/Immunologic: Negative.    Neurological: Negative.    Hematological: Negative.    Psychiatric/Behavioral: Negative.          Objective      Physical Exam  There were no vitals taken for this visit.    There is no height or weight on file to calculate BMI.    General:   Mental Status:  Alert   Appearance: Cooperative, in no acute distress   Build and Nutrition: Obese by BMI  male   Orientation: Alert and oriented to person, place and time   Posture: Normal   Gait: With a walker    Integument:   Right hip: Wound is healing well, no open areas, no drainage    Lower Extremity:   Right Hip:    Tenderness:  None    Swelling:  Positive right hip    No leg length discrepancy      Imaging/Studies  Imaging Results (Last 24 Hours)       ** No results found for the last 24 hours. **          No new imaging today.    Assessment and Plan     Diagnoses and all orders for this visit:    1. S/P total right hip arthroplasty (Primary)    2. Seroma of musculoskeletal structure after musculoskeletal system procedure        1. S/P total right hip arthroplasty    2. Seroma of musculoskeletal structure after musculoskeletal system procedure        I reviewed my findings with the patient.  His right total hip arthroplasty is functioning well.  His wound drainage has resolved.  He may resume physical therapy at this point, and I will see him back in 2 weeks for checkup.  I will see him back sooner for any problems.    Return in about 2 weeks (around 3/4/2024).      Shaq High MD  02/19/24  08:35 EST    Dictated Utilizing Dragon Dictation

## 2024-02-20 ENCOUNTER — TREATMENT (OUTPATIENT)
Dept: PHYSICAL THERAPY | Facility: CLINIC | Age: 64
End: 2024-02-20
Payer: COMMERCIAL

## 2024-02-20 DIAGNOSIS — Z47.89 ORTHOPEDIC AFTERCARE: ICD-10-CM

## 2024-02-20 DIAGNOSIS — M16.11 PRIMARY OSTEOARTHRITIS OF RIGHT HIP: Primary | ICD-10-CM

## 2024-02-20 NOTE — PROGRESS NOTES
Re-Assessment / Re-Certification      [address]  [place] [address]    Patient: Tyrell Guzmán   : 1960  Diagnosis/ICD-10 Code:  Primary osteoarthritis of right hip [M16.11]  Referring practitioner: Shaq High MD  Date of Initial Visit: Type: THERAPY  Noted: 2024  Today's Date: 2024  Patient seen for 7 sessions      Subjective:     Subjective Questionnaire: LEFS: 60/80  Clinical Progress: improved  Home Program Compliance: Yes  Treatment has included: therapeutic exercise, neuromuscular re-education, manual therapy, and therapeutic activity    Subjective Evaluation    History of Present Illness  Date of surgery: 2024  Mechanism of injury: CLOF: awakens nightly due to R lower leg pain, RW for ambulation, stands to shower,     Subjective comment: Patient reports seroma has closed and is no longer draining. No recent hip pain. He reports walking feels more stable and he has trialed using SPC, which he feels went well.Quality of life: good    Pain  Current pain ratin  At best pain ratin  At worst pain ratin (at night)         Objective          Active Range of Motion     Right Hip   Flexion: 105 degrees     Passive Range of Motion     Right Hip   Flexion: 110 degrees   Extension: Right hip passive extension: -5.   Abduction: 20 degrees   Adduction: 25 degrees   External rotation (90/90): 30 degrees with pain  Internal rotation (90/90): 10 degrees     Strength/Myotome Testing     Left Hip   Planes of Motion   Flexion: 5  Extension: 4  External rotation: 5    Right Hip   Planes of Motion   Flexion: 5  Extension: 3  Abduction: 2  External rotation: 4+    Left Knee   Flexion: 5  Extension: 5    Right Knee   Flexion: 5  Extension: 5    Ambulation     Comments   RW with minimal weightbearing through arms during right leg stance, slight forward trunk lean, narrow base of support, right Trendelenburg gait but improved right stance time    Functional Assessment  "    Single Leg Stance   Right: 1 seconds    Comments  TU sec using RW    Tandem stance: 15 sec L in front, 8 sec R in front      Assessment & Plan       Assessment  Impairments: abnormal gait, abnormal muscle firing, abnormal muscle tone, abnormal or restricted ROM, activity intolerance, impaired balance, impaired physical strength, lacks appropriate home exercise program, pain with function and weight-bearing intolerance   Functional limitations: carrying objects, lifting, sleeping, walking, pushing, uncomfortable because of pain, moving in bed, standing and stooping   Assessment details: Very pleasant 64 yo presents to PT s/p Right MAGGI on 24; he is doing well and is in minimal pain; he is ambulating very effectively with his RW, and is already climbing 14 steps to get to second floor bedroom; he is understanding of the hip precautions, and is understanding of total hip HEP through [email or web address]; all questions were answered and patient and wife voiced understanding of plan of care; he is an excellent candidate for PT to improve functional mobility and strength in order to RTW at [place] [employer].    - Patient demonstrating improved gait stability and overall tolerance to weightbearing through right leg.  Right hip ROM remains limited, but shows modest improvements with minimal endrange pain.  Patient continues to demonstrate marked right lateral hip weakness and moderate posterior right hip weakness.  Prognosis: good    Goals  Plan Goals: 4 weeks:   1. IND with HEP.  Met  2. Patient to display AROM of right hip WFL for ADL's including transfers, ambulation, etc. In progress  3. Patient to improve LEFS by 1 MCID.  Met  4. Patient to perform TUG < 14\" with LRAD.  Met    8 weeks:  1. Patient to display right hip MMT strength of > 4/5 for functional mobility, work simulation tasks.  In progress  2. Patient to improve LEFS to > 50/80 for RTW.  In progress  3. Patient to ambulate functional distances > 750' " in 6 minute walk test for RTW.  In progress  4. Patient to perform 4/4 on MCTSIB for safe balance while working.  In progress    Plan  Therapy options: will be seen for skilled therapy services  Planned modality interventions: cryotherapy, TENS, thermotherapy (hydrocollator packs), ultrasound, high voltage pulsed current (pain management) and dry needling  Planned therapy interventions: manual therapy, neuromuscular re-education, postural training, soft tissue mobilization, spinal/joint mobilization, strengthening, stretching, therapeutic activities, joint mobilization, home exercise program, gait training, functional ROM exercises, flexibility, balance/weight-bearing training, body mechanics training, abdominal trunk stabilization and transfer training  Frequency: 2x week  Duration in weeks: 8      Progress toward previous goals: Partially Met        Timeframe: 2 months  Prognosis to achieve goals: good    PT Signature: Desmond Ford, PT  PT License [id number]    Based upon review of the patient's progress and continued therapy plan, it is my medical opinion that Tyrell Guzmán should continue physical therapy treatment at [facility]  [address] [place] [phone].    Signature: __________________________________  Shaq High MD    Timed:  Manual Therapy:    0     mins  78851;  Therapeutic Exercise:    10     mins  72897;     Neuromuscular Zeb:    20    mins  86529;    Therapeutic Activity:     25     mins  04108;     Gait Trainin     mins  43676;     Ultrasound:     0     mins  27301;    Electrical Stimulation:    0     mins  19320 ( );    Untimed:  Electrical Stimulation:    0     mins  28953 ( );  Mechanical Traction:    0     mins  41933;     Timed Treatment:   55   mins   Total Treatment:     55   mins

## 2024-02-22 ENCOUNTER — TREATMENT (OUTPATIENT)
Dept: PHYSICAL THERAPY | Facility: CLINIC | Age: 64
End: 2024-02-22
Payer: COMMERCIAL

## 2024-02-22 DIAGNOSIS — Z47.89 ORTHOPEDIC AFTERCARE: ICD-10-CM

## 2024-02-22 DIAGNOSIS — M16.11 PRIMARY OSTEOARTHRITIS OF RIGHT HIP: Primary | ICD-10-CM

## 2024-02-22 NOTE — PROGRESS NOTES
Physical Therapy Daily Progress Note    [address]  [place] [address]      Patient: Tyrell Guzmán   : 1960  Diagnosis/ICD-10 Code:  Primary osteoarthritis of right hip [M16.11]  Referring practitioner: Shaq High MD  Date of Initial Visit: Type: THERAPY  Noted: 2024  Today's Date: 2024  Patient seen for 8 sessions           Subjective   Patient reports muscle soreness in his lateral/posterior right hip, but no increased pain.    Objective   See Exercise, Manual, and Modality Logs for complete treatment.       Assessment/Plan  Patient demonstrates significant improvement in gait stability using SPC compared to 2 weeks ago at last attempt.  Resumed right hip PROM and AAROM flexion stretching with good tolerance, with patient reporting posterior hip/thigh stretch and no groin pain.  Introduced hip flexor strengthening for partial ROM at low repetition to avoid overload.    Consolidated and updated HEP to reflect current program.        Timed:  Manual Therapy:    10     mins  55954;  Therapeutic Exercise:    15     mins  90267;     Neuromuscular Zeb:   10    mins  30943;    Therapeutic Activity:     25     mins  66326;     Gait Trainin     mins  01074;     Ultrasound:     0     mins  31074;    Electrical Stimulation:    0     mins  04344 ( );    Untimed:  Electrical Stimulation:    0     mins  65993 ( );  Mechanical Traction:    0     mins  72727;     Timed Treatment:   60   mins   Total Treatment:     60   mins    Desmond Ford PT  Physical Therapist

## 2024-02-26 ENCOUNTER — TREATMENT (OUTPATIENT)
Dept: PHYSICAL THERAPY | Facility: CLINIC | Age: 64
End: 2024-02-26
Payer: COMMERCIAL

## 2024-02-26 DIAGNOSIS — M16.11 PRIMARY OSTEOARTHRITIS OF RIGHT HIP: Primary | ICD-10-CM

## 2024-02-26 DIAGNOSIS — Z47.89 ORTHOPEDIC AFTERCARE: ICD-10-CM

## 2024-02-26 PROCEDURE — 97140 MANUAL THERAPY 1/> REGIONS: CPT | Performed by: PHYSICAL THERAPIST

## 2024-02-26 PROCEDURE — 97530 THERAPEUTIC ACTIVITIES: CPT | Performed by: PHYSICAL THERAPIST

## 2024-02-26 PROCEDURE — 97110 THERAPEUTIC EXERCISES: CPT | Performed by: PHYSICAL THERAPIST

## 2024-02-26 PROCEDURE — 97112 NEUROMUSCULAR REEDUCATION: CPT | Performed by: PHYSICAL THERAPIST

## 2024-02-26 NOTE — PROGRESS NOTES
Physical Therapy Daily Progress Note    [address]  [place] [address]      Patient: Tyrell Guzmán   : 1960  Diagnosis/ICD-10 Code:  Primary osteoarthritis of right hip [M16.11]  Referring practitioner: Shaq High MD  Date of Initial Visit: Type: THERAPY  Noted: 2024  Today's Date: 2024  Patient seen for 9 sessions           Subjective   Patient reports no soreness or pain since last visit.    Objective   See Exercise, Manual, and Modality Logs for complete treatment.       Assessment/Plan  Introduced unilateral CKC strengthening with short step height.  Patient demonstrated deficits in lateral hip strength, with contralateral pelvic drop without left hand support, but recruitment improved with practice.    Fatigue observed toward end of treatment, likely due to increased unilateral weight bearing activities.         Timed:  Manual Therapy:    8     mins  95765;  Therapeutic Exercise:    25     mins  74423;     Neuromuscular Zeb:   10    mins  27860;    Therapeutic Activity:     13     mins  08656;     Gait Trainin     mins  88606;     Ultrasound:     0     mins  95754;    Electrical Stimulation:    0     mins  97995 ( );    Untimed:  Electrical Stimulation:    0     mins  81340 ( );  Mechanical Traction:    0     mins  83128;     Timed Treatment:   56   mins   Total Treatment:     56   mins    Desmond Ford PT  Physical Therapist

## 2024-03-01 ENCOUNTER — TELEPHONE (OUTPATIENT)
Dept: ORTHOPEDIC SURGERY | Facility: CLINIC | Age: 64
End: 2024-03-01
Payer: COMMERCIAL

## 2024-03-01 NOTE — TELEPHONE ENCOUNTER
Spoke with patient. Informed him that he is able to drive as long as he is off his pain medication. Patient verbalized understanding and appreciation.

## 2024-03-04 ENCOUNTER — TREATMENT (OUTPATIENT)
Dept: PHYSICAL THERAPY | Facility: CLINIC | Age: 64
End: 2024-03-04
Payer: COMMERCIAL

## 2024-03-04 DIAGNOSIS — Z47.89 ORTHOPEDIC AFTERCARE: ICD-10-CM

## 2024-03-04 DIAGNOSIS — M16.11 PRIMARY OSTEOARTHRITIS OF RIGHT HIP: Primary | ICD-10-CM

## 2024-03-04 NOTE — PROGRESS NOTES
Physical Therapy Daily Progress Note    [address] [facility], [address]  [place] [address]      Patient: Tyrell Guzmán   : 1960  Diagnosis/ICD-10 Code:  Primary osteoarthritis of right hip [M16.11]  Referring practitioner: Shaq High MD  Date of Initial Visit: Type: THERAPY  Noted: 2024  Today's Date: 3/4/2024  Patient seen for 10 sessions           Subjective   Patient reports driving again, using reciprocal pattern for stairs, and pressing less through his SPC.    Objective          Passive Range of Motion     Right Hip   Flexion: 110 degrees   Extension: Right hip passive extension: -10 deg from neutral.   External rotation (90/90): 20 degrees   Internal rotation (90/90): 10 degrees     Strength/Myotome Testing     Right Hip   Planes of Motion   Flexion: 5  Abduction: 3-  External rotation: 4    Right Knee   Flexion: 5  Extension: 4+      See Exercise, Manual, and Modality Logs for complete treatment.       Assessment/Plan  Advanced CKC RLE strengthening with squat progression and abduction progression.    Patient demonstrating improved right hip PROM and strength grossly, with abduction weakness remaining his most limiting factor.          Timed:  Manual Therapy:    0     mins  38979;  Therapeutic Exercise:    10     mins  39162;     Neuromuscular Zeb:   10    mins  96166;    Therapeutic Activity:     20     mins  15638;     Gait Trainin     mins  73032;     Ultrasound:     0     mins  05916;    Electrical Stimulation:    0     mins  06077 ( );    Untimed:  Electrical Stimulation:    0     mins  36025 ( );  Mechanical Traction:    0     mins  90788;     Timed Treatment:   40   mins   Total Treatment:     40   mins    Desmond Ford PT  Physical Therapist

## 2024-03-06 ENCOUNTER — OFFICE VISIT (OUTPATIENT)
Dept: ORTHOPEDIC SURGERY | Facility: CLINIC | Age: 64
End: 2024-03-06
Payer: COMMERCIAL

## 2024-03-06 ENCOUNTER — TREATMENT (OUTPATIENT)
Dept: PHYSICAL THERAPY | Facility: CLINIC | Age: 64
End: 2024-03-06
Payer: COMMERCIAL

## 2024-03-06 DIAGNOSIS — Z47.89 ORTHOPEDIC AFTERCARE: ICD-10-CM

## 2024-03-06 DIAGNOSIS — M16.11 PRIMARY OSTEOARTHRITIS OF RIGHT HIP: Primary | ICD-10-CM

## 2024-03-06 DIAGNOSIS — Z96.641 S/P TOTAL RIGHT HIP ARTHROPLASTY: Primary | ICD-10-CM

## 2024-03-06 PROCEDURE — 99024 POSTOP FOLLOW-UP VISIT: CPT | Performed by: ORTHOPAEDIC SURGERY

## 2024-03-06 NOTE — PROGRESS NOTES
Physical Therapy Daily Progress Note    [address], [address]  [place] [address]      Patient: Tyrell Guzmán   : 1960  Diagnosis/ICD-10 Code:  Primary osteoarthritis of right hip [M16.11]  Referring practitioner: Shaq High MD  Date of Initial Visit: Type: THERAPY  Noted: 2024  Today's Date: 3/6/2024  Patient seen for 11 sessions           Subjective   Patient reports lateral hip soreness but no increased pain following last visit.    Objective   See Exercise, Manual, and Modality Logs for complete treatment.       Assessment/Plan  Progressed CKC strengthening. Corrected newer exercise for CKC hip abd. Improved stability noted with step ups compared to last visit.             Timed:  Manual Therapy:    10     mins  84046;  Therapeutic Exercise:    12     mins  68114;     Neuromuscular Zeb:   10    mins  56372;    Therapeutic Activity:     15     mins  17991;     Gait Trainin     mins  97960;     Ultrasound:     0     mins  30659;    Electrical Stimulation:    0     mins  18244 ( );    Untimed:  Electrical Stimulation:    0     mins  65820 ( );  Mechanical Traction:    0     mins  52474;     Timed Treatment:   47   mins   Total Treatment:     47   mins    Desmond Ford PT  Physical Therapist

## 2024-03-06 NOTE — PROGRESS NOTES
[facility] Note    Subjective     Chief Complaint   Patient presents with    Post-op     2 week follow up --7 weeks S/P MODIFIED ANTERIOR TOTAL HIP ARTHROPLASTY - RIGHT (DOS: 1/18/24          HPI    It has been 2  week(s) since Mr. Guzmán's last visit. He returns to clinic today for postoperative follow-up of right hip arthroplasty. The issue has been ongoing for 7 week(s). He rates his pain a 1/10 on the pain scale. Previous/current treatments: physical therapy and weight loss. Current symptoms:  no symptoms  . He has no pain with any movement ; resting improve the pain. Overall, he is doing better.  Ambulating with the aid of a cane.  No wound issues.    I have reviewed the following portions of the patient's history and agree with: History of Present Illness and Review of Systems    Patient Active Problem List   Diagnosis    Acute bronchitis    Obstructive apnea    Essential hypertension    Bilateral lower extremity edema.    Abnormal EKG    Obesity    History of colon cancer    Prostate cancer    Diabetes    Status post prostatectomy, lap robotic    Adenomatous duodenal polyp    Eczema    Encounter for drug therapy    History of adenocarcinoma of prostate    Primary osteoarthritis of right hip    Raised prostate specific antigen    Type 2 diabetes mellitus with hyperglycemia, without long-term current use of insulin    Hypertension    Morbid obesity with body mass index (BMI) of 45.0 to 49.9 in adult    Obesity    Obstructive sleep apnea    Malignant tumor of prostate    Degenerative arthritis of hip    Hyperlipidemia    Pain of right hip joint    Morbid obesity with body mass index (BMI) of 40.0 or higher    High prostate specific antigen (PSA)    Status post total hip replacement, right     Past Medical History:   Diagnosis Date    Abnormal EKG     Bilateral lower extremity edema.     Colon cancer     colectomy    CPAP (continuous positive airway pressure) dependence     Diabetes  mellitus     Enlarged prostate     Hip arthrosis Rd    History of chemotherapy     x6 months.    History of radiation therapy 06/18/2020    prostate bed s/p prostatectomy    Hypertension     Obesity     Prostate cancer     Sleep apnea     Wears glasses       Past Surgical History:   Procedure Laterality Date    COLECTOMY PARTIAL / TOTAL      COLONOSCOPY  2017    PROSTATECTOMY N/A 01/10/2020    Procedure: PROSTATECTOMY LAPAROSCOPIC WITH DAVINCI ROBOT;  Surgeon: Andrés Dietz MD;  Location:  [facility];  Service: DaVinci    TOTAL HIP ARTHROPLASTY Right 1/18/2024    Procedure: MODIFIED ANTERIOR TOTAL HIP ARTHROPLASTY - RIGHT;  Surgeon: Shaq High MD;  Location:  [facility];  Service: Orthopedics;  Laterality: Right;    TUMOR REMOVAL Right 2016    Shoulder- Benign    VASECTOMY        Family History   Problem Relation Age of Onset    Heart disease Mother     Diabetes Father     Stroke Father     Cancer Father     Heart disease Father     Hypertension Father     Diabetes Sister     Heart attack Sister     Diabetes Sister     Diabetes Sister     Diabetes Brother     Hyperlipidemia Other      Social History     Socioeconomic History    Marital status:    Tobacco Use    Smoking status: Never     Passive exposure: Past    Smokeless tobacco: Never   Vaping Use    Vaping status: Never Used   Substance and Sexual Activity    Alcohol use: Yes     Alcohol/week: 3.0 standard drinks of alcohol     Types: 2 Cans of beer, 1 Drinks containing 0.5 oz of alcohol per week    Drug use: No    Sexual activity: Yes     Partners: Female     Birth control/protection: None      Current Outpatient Medications on File Prior to Visit   Medication Sig Dispense Refill    aspirin (ASPIR) 81 MG EC tablet Take 1 tablet by mouth 2 (Two) Times a Day. 60 tablet 0    B Complex Vitamins (VITAMIN B COMPLEX PO) Take 1 tablet by mouth Daily.      bumetanide (BUMEX) 0.5 MG tablet TAKE 1 TABLET DAILY (Patient taking differently: Take 1 tablet by  mouth Daily.) 90 tablet 3    Chlorhexidine Gluconate 4 % solution Shower with hibiclens solution as directed for 5 days prior to surgery. 237 mL 0    cholecalciferol (VITAMIN D3) 1.25 MG (05170 UT) capsule Take 1 capsule by mouth Every 7 (Seven) Days.      losartan (COZAAR) 100 MG tablet Take 1 tablet by mouth Daily.      meloxicam (MOBIC) 15 MG tablet Take 1 tablet by mouth Daily As Needed for Moderate Pain or Mild Pain. 30 tablet 0    Multiple Vitamins-Minerals (CENTRUM SILVER 50+MEN) tablet Take 1 tablet by mouth Daily.      rosuvastatin (CRESTOR) 5 MG tablet TAKE 1 TABLET DAILY (Patient taking differently: Take 1 tablet by mouth Daily.) 90 tablet 3    triamterene-hydrochlorothiazide (MAXZIDE) 75-50 MG per tablet TAKE 1 TABLET DAILY (Patient taking differently: Take 1 tablet by mouth Daily.) 90 tablet 3    Trulicity 4.5 MG/0.5ML solution pen-injector Inject 0.5 mL as directed 1 (One) Time Per Week. Saturday       No current facility-administered medications on file prior to visit.      Allergies   Allergen Reactions    Lisinopril Cough        Review of Systems   Constitutional:  Negative for activity change, appetite change, chills, diaphoresis, fatigue, fever and unexpected weight change.   HENT:  Negative for congestion, dental problem, drooling, ear discharge, ear pain, facial swelling, hearing loss, mouth sores, nosebleeds, postnasal drip, rhinorrhea, sinus pressure, sneezing, sore throat, tinnitus, trouble swallowing and voice change.    Eyes:  Negative for photophobia, pain, discharge, redness, itching and visual disturbance.   Respiratory:  Negative for apnea, cough, choking, chest tightness, shortness of breath, wheezing and stridor.    Cardiovascular:  Negative for chest pain, palpitations and leg swelling.   Gastrointestinal:  Negative for abdominal distention, abdominal pain, anal bleeding, blood in stool, constipation, diarrhea, nausea, rectal pain and vomiting.   Endocrine: Negative for cold  intolerance, heat intolerance, polydipsia, polyphagia and polyuria.   Genitourinary:  Negative for decreased urine volume, difficulty urinating, dysuria, enuresis, flank pain, frequency, genital sores, hematuria and urgency.   Musculoskeletal:  Positive for arthralgias. Negative for back pain, gait problem, joint swelling, myalgias, neck pain and neck stiffness.   Skin:  Negative for color change, pallor, rash and wound.   Allergic/Immunologic: Negative for environmental allergies, food allergies and immunocompromised state.   Neurological:  Negative for dizziness, tremors, seizures, syncope, facial asymmetry, speech difficulty, weakness, light-headedness, numbness and headaches.   Hematological:  Negative for adenopathy. Does not bruise/bleed easily.   Psychiatric/Behavioral:  Negative for agitation, behavioral problems, confusion, decreased concentration, dysphoric mood, hallucinations, self-injury, sleep disturbance and suicidal ideas. The patient is not nervous/anxious and is not hyperactive.         Objective      Physical Exam  There were no vitals taken for this visit.    There is no height or weight on file to calculate BMI.    General:   Mental Status:  Alert   Appearance: Cooperative, in no acute distress   Build and Nutrition: Obese by BMI male   Orientation: Alert and oriented to person, place and time   Posture: Normal   Gait: With a cane    Integument:   Right hip: Wound is well-healed with no signs of infection    Lower Extremity:   Right Hip:    Tenderness:  None    Swelling:  None    Crepitus:  None    Range of motion:  External Rotation: 30°       Internal Rotation: 30°       Flexion:  100°       Extension:  0°    Deformities:  None  Functional testing: Negative Stinchfield    No leg length discrepancy      Imaging/Studies  Imaging Results (Last 24 Hours)       ** No results found for the last 24 hours. **          No new imaging today.    Assessment and Plan     Diagnoses and all orders for this  visit:    1. S/P total right hip arthroplasty (Primary)    2. Orthopedic aftercare        1. S/P total right hip arthroplasty    2. Orthopedic aftercare        I reviewed my findings with the patient.  His right total hip arthroplasty continues to improve.  No wound issues.  He will follow-up with me in 4 weeks with a new x-ray, but I will be happy to see him back sooner for any problems.  Continue with physical therapy.    Return in about 4 weeks (around 4/3/2024) for Recheck with X-Rays.      Shaq High MD  03/06/24  13:29 EST    Dictated Utilizing Dragon Dictation

## 2024-03-11 ENCOUNTER — TREATMENT (OUTPATIENT)
Dept: PHYSICAL THERAPY | Facility: CLINIC | Age: 64
End: 2024-03-11
Payer: COMMERCIAL

## 2024-03-11 DIAGNOSIS — Z47.89 ORTHOPEDIC AFTERCARE: ICD-10-CM

## 2024-03-11 DIAGNOSIS — M16.11 PRIMARY OSTEOARTHRITIS OF RIGHT HIP: Primary | ICD-10-CM

## 2024-03-11 NOTE — PROGRESS NOTES
Physical Therapy Daily Progress Note     [address] [facility], [address]  [place] [address]      Patient: Tyrell Guzmán   : 1960  Diagnosis/ICD-10 Code:  Primary osteoarthritis of right hip [M16.11]  Referring practitioner: Shaq High MD  Date of Initial Visit: Type: THERAPY  Noted: 2024  Today's Date: 3/11/2024  Patient seen for 12 sessions           Subjective   Patient reports right lower gluteal pain starting yesterday after he stood up from a chair, planted on his right leg, and turning his body toward the right while walking.  He reports increased pain with weightbearing today.    Objective   See Exercise, Manual, and Modality Logs for complete treatment.       Assessment/Plan  Screen of right hip revealed TTP of inferior fibers of right glute max and pain with resisted right hip extension.  Resisted hip ER remains weak but was not painful.  External rotators of the hip were not TTP.    Short treatment administered to avoid further symptom exacerbation.  Weightbearing exercises were held in favor of table exercises and the upright bike, all of which he tolerated well.        Timed:  Manual Therapy:    10     mins  49126;  Therapeutic Exercise:    15     mins  50185;     Neuromuscular Zeb:   0    mins  83231;    Therapeutic Activity:     8     mins  56519;     Gait Trainin     mins  77799;     Ultrasound:     0     mins  35015;    Electrical Stimulation:    0     mins  32021 ( );    Untimed:  Electrical Stimulation:    0     mins  23278 ( );  Mechanical Traction:    0     mins  06109;     Timed Treatment:   33   mins   Total Treatment:     33   mins    Desmond Ford PT  Physical Therapist

## 2024-03-14 ENCOUNTER — TREATMENT (OUTPATIENT)
Dept: PHYSICAL THERAPY | Facility: CLINIC | Age: 64
End: 2024-03-14
Payer: COMMERCIAL

## 2024-03-14 DIAGNOSIS — M16.11 PRIMARY OSTEOARTHRITIS OF RIGHT HIP: Primary | ICD-10-CM

## 2024-03-14 DIAGNOSIS — Z47.89 ORTHOPEDIC AFTERCARE: ICD-10-CM

## 2024-03-14 NOTE — PROGRESS NOTES
Physical Therapy Daily Progress Note     [address] [facility], [address]  [place] [address]      Patient: Tyrell Guzmán   : 1960  Diagnosis/ICD-10 Code:  Primary osteoarthritis of right hip [M16.11]  Referring practitioner: Shqa High MD  Date of Initial Visit: Type: THERAPY  Noted: 2024  Today's Date: 3/14/2024  Patient seen for 13 sessions           Subjective   Patient reports his R hip is feeling much better and it feels \close to where it was prior to last weekend.    Objective   See Exercise, Manual, and Modality Logs for complete treatment.       Assessment/Plan  Able to resume many single-leg CKC exercises from previous visits, with lateral hip fatigue noted but no posterior hip pain.  Introduced partial ROM single-leg squats on [employer], during which he fatigues rapidly but reported no pain.    Patient still unable to complete single-leg stance on right for any period of time due to lateral hip weakness.        Timed:  Manual Therapy:    10     mins  56532;  Therapeutic Exercise:    15     mins  49458;     Neuromuscular Zeb:   10    mins  73216;    Therapeutic Activity:     15     mins  18065;     Gait Trainin     mins  21974;     Ultrasound:     0     mins  93382;    Electrical Stimulation:    0     mins  14519 ( );    Untimed:  Electrical Stimulation:    0     mins  16611 ( );  Mechanical Traction:    0     mins  94283;     Timed Treatment:   50   mins   Total Treatment:     50   mins    Desmond Ford PT  Physical Therapist

## 2024-03-18 ENCOUNTER — TREATMENT (OUTPATIENT)
Dept: PHYSICAL THERAPY | Facility: CLINIC | Age: 64
End: 2024-03-18
Payer: COMMERCIAL

## 2024-03-18 DIAGNOSIS — Z47.89 ORTHOPEDIC AFTERCARE: ICD-10-CM

## 2024-03-18 DIAGNOSIS — M16.11 PRIMARY OSTEOARTHRITIS OF RIGHT HIP: Primary | ICD-10-CM

## 2024-03-18 NOTE — PROGRESS NOTES
Physical Therapy Daily Progress Note    [address] [facility], [address]  [place] [address]      Patient: Tyrell Guzmán   : 1960  Diagnosis/ICD-10 Code:  Primary osteoarthritis of right hip [M16.11]  Referring practitioner: Shaq High MD  Date of Initial Visit: Type: THERAPY  Noted: 2024  Today's Date: 3/18/2024  Patient seen for 14 sessions           Subjective   Patient reports soreness after last visit but it resolved quickly.    Objective   See Exercise, Manual, and Modality Logs for complete treatment.       Assessment/Plan  Patient demonstrates improved gait stability without use of SPC, but still requires it for best gait.     Improved endurance noted with lateral hip strength exercises, though force production still lacks.        Timed:  Manual Therapy:    10     mins  97081;  Therapeutic Exercise:    12     mins  68522;     Neuromuscular Zeb:   10    mins  50468;    Therapeutic Activity:     20     mins  83288;     Gait Trainin     mins  62117;     Ultrasound:     0     mins  76229;    Electrical Stimulation:    0     mins  09356 ( );    Untimed:  Electrical Stimulation:    0     mins  65712 ( );  Mechanical Traction:    0     mins  54622;     Timed Treatment:   52   mins   Total Treatment:     52   mins    Desmond Ford PT  Physical Therapist

## 2024-03-20 ENCOUNTER — TREATMENT (OUTPATIENT)
Dept: PHYSICAL THERAPY | Facility: CLINIC | Age: 64
End: 2024-03-20
Payer: COMMERCIAL

## 2024-03-20 DIAGNOSIS — Z47.89 ORTHOPEDIC AFTERCARE: ICD-10-CM

## 2024-03-20 DIAGNOSIS — M16.11 PRIMARY OSTEOARTHRITIS OF RIGHT HIP: Primary | ICD-10-CM

## 2024-03-20 NOTE — PROGRESS NOTES
Re-Assessment / Re-Certification      [address] [facility], [address]  [place] [address]    Patient: Tyrell Guzmán   : 1960  Diagnosis/ICD-10 Code:  Primary osteoarthritis of right hip [M16.11]  Referring practitioner: Shaq High MD  Date of Initial Visit: Type: THERAPY  Noted: 2024  Today's Date: 3/20/2024  Patient seen for 15 sessions      Subjective:     Subjective Questionnaire: LEFS: 64/80  Clinical Progress: improved  Home Program Compliance: Yes  Treatment has included: therapeutic exercise, neuromuscular re-education, manual therapy, and therapeutic activity    Subjective Evaluation    History of Present Illness  Date of surgery: 2024  Mechanism of injury: CLOF: sleeping without impairment, SPC for ambulation but takes a few steps without it at home, using shoe horn and sock assist on R LE    Subjective comment: Patient reports no recent pain though he was sore after last visit.Pain  No pain reported       Objective          Active Range of Motion     Right Hip   Flexion: 105 degrees     Passive Range of Motion     Right Hip   Flexion: 110 degrees   Extension: Right hip passive extension: -10 deg from neutral.   External rotation (90/90): 30 degrees   External rotation (prone): 20 degrees   Internal rotation (90/90): 20 degrees   Internal rotation (prone): 20 degrees     Strength/Myotome Testing     Right Hip   Planes of Motion   Flexion: 5  Extension: 4  Abduction: 3-  External rotation: 5    Right Knee   Flexion: 5  Extension: 5    Ambulation     Comments   Without SPC, significant right Trendelenburg but no loss of balance or instability noted, no contact-guard required by PT.  Significant improvement compared to 1 month ago.      Assessment & Plan       Assessment  Impairments: abnormal gait, abnormal muscle firing, abnormal muscle tone, abnormal or restricted ROM, activity intolerance, impaired balance, impaired physical strength, lacks appropriate home exercise program, pain  "with function and weight-bearing intolerance   Functional limitations: carrying objects, lifting, sleeping, walking, pushing, uncomfortable because of pain, moving in bed, standing and stooping   Assessment details: Very pleasant 64 yo presents to PT s/p Right MAGGI on 1/18/24; he is doing well and is in minimal pain; he is ambulating very effectively with his RW, and is already climbing 14 steps to get to second floor bedroom; he is understanding of the hip precautions, and is understanding of total hip HEP through [facility]; all questions were answered and patient and wife voiced understanding of plan of care; he is an excellent candidate for PT to improve functional mobility and strength in order to RTW at [facility].    2/20- Patient demonstrating improved gait stability and overall tolerance to weightbearing through right leg.  Right hip ROM remains limited, but shows modest improvements with minimal endrange pain.  Patient continues to demonstrate marked right lateral hip weakness and moderate posterior right hip weakness.    3/20- Patient demonstrating significant improvements in right lower extremity strength and functional mobility, while reporting significant symptom improvement and functional progress.  Right hip ROM remains limited, but similar to contralateral hip.  Right lateral hip remains weak, but is now strong enough to somewhat support his right leg in unilateral stance without use of SPC or PT assist.  His most stable gait is still achieved using SPC, but gait activities have been initiated without SPC.  He is beginning to transition exercises to a gym for continued strengthening and will reduce frequency of PT.  Prognosis: good    Goals  Plan Goals: 4 weeks:   1. IND with HEP.  Met  2. Patient to display AROM of right hip WFL for ADL's including transfers, ambulation, etc. In progress  3. Patient to improve LEFS by 1 MCID.  Met  4. Patient to perform TUG < 14\" with LRAD.  Met    8 " weeks:  1. Patient to display right hip MMT strength of > 4/5 for functional mobility, work simulation tasks.  In progress  2. Patient to improve LEFS to > 50/80 for RTW.  In progress  3. Patient to ambulate functional distances > 750' in 6 minute walk test for RTW.  In progress  4. Patient to perform 4/4 on MCTSIB for safe balance while working. Abandoned    Plan  Therapy options: will be seen for skilled therapy services  Planned modality interventions: cryotherapy, TENS, thermotherapy (hydrocollator packs) and dry needling  Planned therapy interventions: manual therapy, neuromuscular re-education, postural training, soft tissue mobilization, spinal/joint mobilization, strengthening, stretching, therapeutic activities, joint mobilization, home exercise program, gait training, functional ROM exercises, flexibility, balance/weight-bearing training, body mechanics training, abdominal trunk stabilization and transfer training  Frequency: 1x week  Duration in weeks: 4      Progress toward previous goals: Partially Met        Timeframe: 1 month  Prognosis to achieve goals: good    PT Signature: Desmond Ford, PT  PT License [id number]    Based upon review of the patient's progress and continued therapy plan, it is my medical opinion that Tyrell Guzmán should continue physical therapy treatment at [facility]  [address] [place] [id number] [phone].    Signature: __________________________________  Shaq High MD    Timed:  Manual Therapy:    0     mins  54432;  Therapeutic Exercise:    13     mins  01185;     Neuromuscular Zeb:    12    mins  38569;    Therapeutic Activity:     24     mins  24627;     Gait Trainin     mins  13139;     Ultrasound:     0     mins  31462;    Electrical Stimulation:    0     mins  09134 ( );    Untimed:  Electrical Stimulation:    0     mins  52928 ( );  Mechanical Traction:    0     mins  49762;      Timed Treatment:   49   mins   Total Treatment:     49   mins

## 2024-03-28 ENCOUNTER — TREATMENT (OUTPATIENT)
Dept: PHYSICAL THERAPY | Facility: CLINIC | Age: 64
End: 2024-03-28
Payer: COMMERCIAL

## 2024-03-28 DIAGNOSIS — Z47.89 ORTHOPEDIC AFTERCARE: ICD-10-CM

## 2024-03-28 DIAGNOSIS — M16.11 PRIMARY OSTEOARTHRITIS OF RIGHT HIP: Primary | ICD-10-CM

## 2024-03-28 NOTE — PROGRESS NOTES
Physical Therapy Daily Progress Note    [address] [facility], [address]  [place] [address]      Patient: Tyrell Guzmán   : 1960  Diagnosis/ICD-10 Code:  Primary osteoarthritis of right hip [M16.11]  Referring practitioner: Shaq High MD  Date of Initial Visit: Type: THERAPY  Noted: 2024  Today's Date: 3/28/2024  Patient seen for 16 sessions           Subjective   Patient reports walking without SPC became much easier this week and hasn't been using it at all. Gym activities have progressed well.    Objective   See Exercise, Manual, and Modality Logs for complete treatment.       Assessment/Plan  Significant improvement noted with SLS, gait, and stepping up activities today. Advanced CKC SL strengthening/balance with fatigue but no increased pain.            Timed:  Manual Therapy:    10     mins  97550;  Therapeutic Exercise:    15     mins  61030;     Neuromuscular Zeb:   0    mins  43077;    Therapeutic Activity:     20     mins  09620;     Gait Trainin     mins  76076;     Ultrasound:     0     mins  24914;    Electrical Stimulation:    0     mins  04058 ( );    Untimed:  Electrical Stimulation:    0     mins  51314 ( );  Mechanical Traction:    0     mins  80402;     Timed Treatment:   45   mins   Total Treatment:     45   mins    Desmond Ford PT  Physical Therapist

## 2024-04-04 ENCOUNTER — TREATMENT (OUTPATIENT)
Dept: PHYSICAL THERAPY | Facility: CLINIC | Age: 64
End: 2024-04-04
Payer: COMMERCIAL

## 2024-04-04 DIAGNOSIS — M16.11 PRIMARY OSTEOARTHRITIS OF RIGHT HIP: Primary | ICD-10-CM

## 2024-04-04 DIAGNOSIS — Z47.89 ORTHOPEDIC AFTERCARE: ICD-10-CM

## 2024-04-04 NOTE — PROGRESS NOTES
Physical Therapy Daily Progress Note    [address] [facility], [address]  [place] [address]      Patient: Tyrell Guzmán   : 1960  Diagnosis/ICD-10 Code:  Primary osteoarthritis of right hip [M16.11]  Referring practitioner: Shaq High MD  Date of Initial Visit: Type: THERAPY  Noted: 2024  Today's Date: 2024  Patient seen for 17 sessions           Subjective   Patient reports continued strengthening of L hip at gym without pain.    Objective   See Exercise, Manual, and Modality Logs for complete treatment.       Assessment/Plan  Continued functional right leg CKC strengthening on cooperating aspects of balance, during which patient continues to demonstrate improvement in performance but required increased rest due to fatigue.  Some exercises held due to patient also completing strength exercises at gym yesterday.            Timed:  Manual Therapy:    10     mins  96901;  Therapeutic Exercise:    10     mins  45151;     Neuromuscular Zeb:   0    mins  38995;    Therapeutic Activity:     20     mins  36234;     Gait Trainin     mins  33663;     Ultrasound:     0     mins  22926;    Electrical Stimulation:    0     mins  15559 ( );    Untimed:  Electrical Stimulation:    0     mins  27905 ( );  Mechanical Traction:    0     mins  09228;     Timed Treatment:   40   mins   Total Treatment:     40   mins    Desmond Ford PT  Physical Therapist

## 2024-04-10 ENCOUNTER — OFFICE VISIT (OUTPATIENT)
Dept: ORTHOPEDIC SURGERY | Facility: CLINIC | Age: 64
End: 2024-04-10
Payer: COMMERCIAL

## 2024-04-10 DIAGNOSIS — Z47.89 ORTHOPEDIC AFTERCARE: ICD-10-CM

## 2024-04-10 DIAGNOSIS — Z96.641 S/P TOTAL RIGHT HIP ARTHROPLASTY: Primary | ICD-10-CM

## 2024-04-10 PROCEDURE — 99024 POSTOP FOLLOW-UP VISIT: CPT | Performed by: ORTHOPAEDIC SURGERY

## 2024-04-10 NOTE — PROGRESS NOTES
[facility] Note    Subjective     Chief Complaint   Patient presents with    Post-op Follow-up     5 week follow up - 2.5 MONTHS S/P MODIFIED ANTERIOR TOTAL HIP ARTHROPLASTY - RIGHT (DOS: 1/18/24)        HPI    It has been 5  week(s) since Mr. Guzmán's last visit. He returns to clinic today for postoperative follow-up of right hip arthroplasty. The issue has been ongoing for 2.5 month(s). He rates his pain a 0/10 on the pain scale. Previous/current treatments: cane/walker, NSAIDS, physical therapy, and weight loss. Overall, he is doing better.  100% improvement compared to his preoperative symptoms.  Fully ambulatory without external aids.    I have reviewed the following portions of the patient's history and agree with: History of Present Illness and Review of Systems    Patient Active Problem List   Diagnosis    Acute bronchitis    Obstructive apnea    Essential hypertension    Bilateral lower extremity edema.    Abnormal EKG    Obesity    History of colon cancer    Prostate cancer    Diabetes    Status post prostatectomy, lap robotic    Adenomatous duodenal polyp    Eczema    Encounter for drug therapy    History of adenocarcinoma of prostate    Primary osteoarthritis of right hip    Raised prostate specific antigen    Type 2 diabetes mellitus with hyperglycemia, without long-term current use of insulin    Hypertension    Morbid obesity with body mass index (BMI) of 45.0 to 49.9 in adult    Obesity    Obstructive sleep apnea    Malignant tumor of prostate    Degenerative arthritis of hip    Hyperlipidemia    Pain of right hip joint    Morbid obesity with body mass index (BMI) of 40.0 or higher    High prostate specific antigen (PSA)    Status post total hip replacement, right     Past Medical History:   Diagnosis Date    Abnormal EKG     Bilateral lower extremity edema.     Colon cancer     colectomy    CPAP (continuous positive airway pressure) dependence     Diabetes mellitus      Enlarged prostate     Hip arthrosis Rd    History of chemotherapy     x6 months.    History of radiation therapy 06/18/2020    prostate bed s/p prostatectomy    Hypertension     Obesity     Prostate cancer     Sleep apnea     Wears glasses       Past Surgical History:   Procedure Laterality Date    COLECTOMY PARTIAL / TOTAL      COLONOSCOPY  2017    PROSTATECTOMY N/A 01/10/2020    Procedure: PROSTATECTOMY LAPAROSCOPIC WITH DAVINCI ROBOT;  Surgeon: Andrés Dietz MD;  Location:  [facility];  Service: DaVinci    TOTAL HIP ARTHROPLASTY Right 1/18/2024    Procedure: MODIFIED ANTERIOR TOTAL HIP ARTHROPLASTY - RIGHT;  Surgeon: Shaq High MD;  Location:  [facility];  Service: Orthopedics;  Laterality: Right;    TUMOR REMOVAL Right 2016    Shoulder- Benign    VASECTOMY        Family History   Problem Relation Age of Onset    Heart disease Mother     Diabetes Father     Stroke Father     Cancer Father     Heart disease Father     Hypertension Father     Diabetes Sister     Heart attack Sister     Diabetes Sister     Diabetes Sister     Diabetes Brother     Hyperlipidemia Other      Social History     Socioeconomic History    Marital status:    Tobacco Use    Smoking status: Never     Passive exposure: Past    Smokeless tobacco: Never   Vaping Use    Vaping status: Never Used   Substance and Sexual Activity    Alcohol use: Yes     Alcohol/week: 3.0 standard drinks of alcohol     Types: 2 Cans of beer, 1 Drinks containing 0.5 oz of alcohol per week    Drug use: No    Sexual activity: Yes     Partners: Female     Birth control/protection: None      Current Outpatient Medications on File Prior to Visit   Medication Sig Dispense Refill    aspirin (ASPIR) 81 MG EC tablet Take 1 tablet by mouth 2 (Two) Times a Day. 60 tablet 0    B Complex Vitamins (VITAMIN B COMPLEX PO) Take 1 tablet by mouth Daily.      bumetanide (BUMEX) 0.5 MG tablet TAKE 1 TABLET DAILY (Patient taking differently: Take 1 tablet by mouth Daily.)  90 tablet 3    Chlorhexidine Gluconate 4 % solution Shower with hibiclens solution as directed for 5 days prior to surgery. 237 mL 0    cholecalciferol (VITAMIN D3) 1.25 MG (93945 UT) capsule Take 1 capsule by mouth Every 7 (Seven) Days.      losartan (COZAAR) 100 MG tablet Take 1 tablet by mouth Daily.      meloxicam (MOBIC) 15 MG tablet Take 1 tablet by mouth Daily As Needed for Moderate Pain or Mild Pain. 30 tablet 0    Multiple Vitamins-Minerals (CENTRUM SILVER 50+MEN) tablet Take 1 tablet by mouth Daily.      rosuvastatin (CRESTOR) 5 MG tablet TAKE 1 TABLET DAILY (Patient taking differently: Take 1 tablet by mouth Daily.) 90 tablet 3    triamterene-hydrochlorothiazide (MAXZIDE) 75-50 MG per tablet TAKE 1 TABLET DAILY (Patient taking differently: Take 1 tablet by mouth Daily.) 90 tablet 3    Trulicity 4.5 MG/0.5ML solution pen-injector Inject 0.5 mL as directed 1 (One) Time Per Week. Saturday       No current facility-administered medications on file prior to visit.      Allergies   Allergen Reactions    Lisinopril Cough        Review of Systems   Constitutional:  Negative for activity change, appetite change, chills, diaphoresis, fatigue, fever and unexpected weight change.   HENT:  Negative for congestion, dental problem, drooling, ear discharge, ear pain, facial swelling, hearing loss, mouth sores, nosebleeds, postnasal drip, rhinorrhea, sinus pressure, sneezing, sore throat, tinnitus, trouble swallowing and voice change.    Eyes:  Negative for photophobia, pain, discharge, redness, itching and visual disturbance.   Respiratory:  Negative for apnea, cough, choking, chest tightness, shortness of breath, wheezing and stridor.    Cardiovascular:  Negative for chest pain, palpitations and leg swelling.   Gastrointestinal:  Negative for abdominal distention, abdominal pain, anal bleeding, blood in stool, constipation, diarrhea, nausea, rectal pain and vomiting.   Endocrine: Negative for cold intolerance, heat  intolerance, polydipsia, polyphagia and polyuria.   Genitourinary:  Negative for decreased urine volume, difficulty urinating, dysuria, enuresis, flank pain, frequency, genital sores, hematuria and urgency.   Musculoskeletal:  Positive for arthralgias. Negative for back pain, gait problem, joint swelling, myalgias, neck pain and neck stiffness.   Skin:  Negative for color change, pallor, rash and wound.   Allergic/Immunologic: Negative for environmental allergies, food allergies and immunocompromised state.   Neurological:  Negative for dizziness, tremors, seizures, syncope, facial asymmetry, speech difficulty, weakness, light-headedness, numbness and headaches.   Hematological:  Negative for adenopathy. Does not bruise/bleed easily.   Psychiatric/Behavioral:  Negative for agitation, behavioral problems, confusion, decreased concentration, dysphoric mood, hallucinations, self-injury, sleep disturbance and suicidal ideas. The patient is not nervous/anxious and is not hyperactive.         Objective      Physical Exam  There were no vitals taken for this visit.    There is no height or weight on file to calculate BMI.    General:   Mental Status:  Alert   Appearance: Cooperative, in no acute distress   Build and Nutrition: Obese by BMI male   Orientation: Alert and oriented to person, place and time   Posture: Normal   Gait: Normal/nonantalgic    Integument:              Right hip: Wound is well-healed with no signs of infection     Lower Extremity:              Right Hip:                          Tenderness:    None                          Swelling:          None                          Crepitus:          None                          Range of motion:        External Rotation:       30°                                                              Internal Rotation:        30°                                                              Flexion:                       100°                                                               Extension:                   0°                       Deformities:     None  Functional testing: Negative [facility]                          No leg length discrepancy    Imaging/Studies  Imaging Results (Last 24 Hours)       Procedure Component Value Units Date/Time    XR Hip With or Without Pelvis 2 - 3 View Right [[id number]] Resulted: 04/10/24 0820     Updated: 04/10/24 0821    Narrative:      Right Hip Radiographs  Indication: status-post right total hip arthroplasty  Views: low AP pelvis and lateral of the right hip    Comparison: no change compared to prior study, 2/5/2024    Findings:   No change in component alignment, with no signs of loosening or failure.                Assessment and Plan     Diagnoses and all orders for this visit:    1. S/P total right hip arthroplasty (Primary)  -     XR Hip With or Without Pelvis 2 - 3 View Right    2. Orthopedic aftercare        1. S/P total right hip arthroplasty    2. Orthopedic aftercare        I reviewed my findings with the patient.  His right total hip arthroplasty is functioning well and he is pleased with results.  I will see him back in 9 months, which will be a 1 year checkup with x-rays.  I will see him back sooner for any problems.    Return in about 9 months (around 1/10/2025).      Shaq High MD  04/10/24  08:24 EDT    Dictated Utilizing Dragon Dictation

## 2024-04-11 ENCOUNTER — TREATMENT (OUTPATIENT)
Dept: PHYSICAL THERAPY | Facility: CLINIC | Age: 64
End: 2024-04-11
Payer: COMMERCIAL

## 2024-04-11 DIAGNOSIS — M16.11 PRIMARY OSTEOARTHRITIS OF RIGHT HIP: Primary | ICD-10-CM

## 2024-04-11 DIAGNOSIS — Z47.89 ORTHOPEDIC AFTERCARE: ICD-10-CM

## 2024-04-11 NOTE — PROGRESS NOTES
Physical Therapy Daily Progress Note    [address] [facility], [address]  [place] [address]      Patient: Tyrell Guzmán   : 1960  Diagnosis/ICD-10 Code:  Primary osteoarthritis of right hip [M16.11]  Referring practitioner: Shaq High MD  Date of Initial Visit: Type: THERAPY  Noted: 2024  Today's Date: 2024  Patient seen for 18 sessions           Subjective   Patient reports consistently exercising at the gym. His hip feels tired this morning. He was released to work by ortho.    Objective   See Exercise, Manual, and Modality Logs for complete treatment.       Assessment/Plan  Added anterior hip mobilization to improve extension ROM. Progressed lateral hip strengthening and static balance activities, but avoided SL CKC strength exercises as he demonstrated moderate limp upon entering the clinic today.    Will see patient in 3 weeks, after he has worked for 2 weeks, as assess readiness for DC.        Timed:  Manual Therapy:    13     mins  28732;  Therapeutic Exercise:    20     mins  78252;     Neuromuscular Zeb:   0    mins  75050;    Therapeutic Activity:     10     mins  37655;     Gait Trainin     mins  47791;     Ultrasound:     0     mins  04504;    Electrical Stimulation:    0     mins  16868 (MC );    Untimed:  Electrical Stimulation:    0     mins  12304 (MC );  Mechanical Traction:    0     mins  65984;     Timed Treatment:   43   mins   Total Treatment:     43   mins    Desmond Ford PT  Physical Therapist

## 2024-04-12 ENCOUNTER — TELEPHONE (OUTPATIENT)
Dept: ORTHOPEDIC SURGERY | Facility: CLINIC | Age: 64
End: 2024-04-12

## 2024-04-12 ENCOUNTER — TELEPHONE (OUTPATIENT)
Dept: ORTHOPEDIC SURGERY | Facility: CLINIC | Age: 64
End: 2024-04-12
Payer: COMMERCIAL

## 2024-04-12 NOTE — TELEPHONE ENCOUNTER
"Hub staff attempted to follow warm transfer process and was unsuccessful     Caller: Tyrell Guzmán \"Jose\"    Relationship to patient: Self    Best call back number: [phone]    Patient is needing: PATIENT HAD HIP SURGERY IN JANUARY OF 2024 AND WOULD LIKE TO KNOW WHAT THE RESTRICTIONS OF FLYING OR TRAVELING IN A CAR ARE? PLEASE REACH OUT AND ADVISE         "

## 2024-04-12 NOTE — TELEPHONE ENCOUNTER
I spoke to Mr. Guzmán this morning regarding traveling restrictions. I explained to him that there are not any restrictions on travel. I advised him to be sure to take breaks and move around if he is going to be seated for an extended period of time.     Mr. Guzmán verbalized understanding and was very appreciative.    Floresita Villatoro MA

## 2024-04-12 NOTE — TELEPHONE ENCOUNTER
"    Caller: Tyrell Guzmán \"Jose\"    Relationship: Self    Best call back number: [phone]    What form or medical record are you requesting: RETURN TO WORK NOTE    Who is requesting this form or medical record from you: WORK    How would you like to receive the form or medical records (pick-up, mail, fax): MY CHART      Timeframe paperwork needed: ASAP    Additional notes: PT NEEDS A NOTE WITH OR WITH OUT RESTRICTIONS ON DR PREFERENCE BUT TO RETURN TO WORK          "

## 2024-04-30 ENCOUNTER — TREATMENT (OUTPATIENT)
Dept: PHYSICAL THERAPY | Facility: CLINIC | Age: 64
End: 2024-04-30
Payer: COMMERCIAL

## 2024-04-30 DIAGNOSIS — Z47.89 ORTHOPEDIC AFTERCARE: ICD-10-CM

## 2024-04-30 DIAGNOSIS — M16.11 PRIMARY OSTEOARTHRITIS OF RIGHT HIP: Primary | ICD-10-CM

## 2024-04-30 PROCEDURE — 97530 THERAPEUTIC ACTIVITIES: CPT | Performed by: PHYSICAL THERAPIST

## 2024-04-30 PROCEDURE — 97110 THERAPEUTIC EXERCISES: CPT | Performed by: PHYSICAL THERAPIST

## 2024-04-30 NOTE — PROGRESS NOTES
"Physical Therapy Daily Progress Note and Discharge Summary      [facility]  [address]  [place] [address]    Patient: Tyrell Guzmán   : 1960  Diagnosis/ICD-10 Code:  Primary osteoarthritis of right hip [M16.11]  Referring practitioner: Shaq High MD  Date of Initial Visit: Type: THERAPY  Noted: 2024  Today's Date: 2024  Patient seen for 19 sessions      Subjective:     Subjective Questionnaire: LEFS: 64/80    Subjective Evaluation    History of Present Illness  Date of surgery: 2024  Mechanism of injury: CLOF: sleeping without impairment, no AD for ambulation, using shoe horn and sock assist on R LE    Subjective comment: Patient reports no issues resuming work. No pain with work or driving but his hip gets stiff sitting for prolonged periods.Pain  No pain reported         Objective          Active Range of Motion     Right Hip   Flexion: 100 degrees     Passive Range of Motion     Right Hip   Flexion: 110 degrees   Extension: Right hip passive extension: -10 deg from neutral.   External rotation (90/90): 30 degrees   Internal rotation (90/90): 20 degrees     Strength/Myotome Testing     Right Hip   Planes of Motion   Flexion: 5  Extension: 4  Abduction: 4  External rotation: 5    Right Knee   Flexion: 5  Extension: 5    Ambulation     Comments   Slight decreased stance time on R but improves with steps        Goals  4 weeks:   1. IND with HEP.  Met  2. Patient to display AROM of right hip WFL for ADL's including transfers, ambulation, etc. Met   3. Patient to improve LEFS by 1 MCID.  Met  4. Patient to perform TUG < 14\" with LRAD.  Met     8 weeks:  1. Patient to display right hip MMT strength of > 4/5 for functional mobility, work simulation tasks. Met   2. Patient to improve LEFS to > 50/80 for RTW.  Met   3. Patient to ambulate functional distances > 750' in 6 minute walk test for RTW.  Met     Discharge Summary  Patient reporting " significant symptomatic and functional improvements over the past month.  Patient reports no pain overall, though his hip becomes stiff with prolonged sitting.  No longer requires use of assistive device for ambulation and can walk/stand without limitation.  Right hip strength has markedly improved, especially abduction, the right hip ROM remains grossly limited.  This is expected since his contralateral/nonsurgical hip is also very stiff.  He is independent with [facility] for maximizing right hip mobility and functional strengthening and is ready for DC at this time.    Timed:  Manual Therapy:    0     mins  93381;  Therapeutic Exercise:    10     mins  76428;     Neuromuscular Zeb:    0    mins  83909;    Therapeutic Activity:     20     mins  00538;     Gait Trainin     mins  66284;     Ultrasound:     0     mins  55835;    Electrical Stimulation:    0     mins  61435 ( );    Untimed:  Electrical Stimulation:    0     mins  22892 ( );  Mechanical Traction:    0     mins  52649;   Dry Needlin     mins      Timed Treatment:   30   mins   Total Treatment:     30   mins      Desmond Ford PT  Physical Therapist

## 2024-05-20 NOTE — TELEPHONE ENCOUNTER
PATIENT IS CALLING IN REQUESTING ANTIBIOTICS. HE IS HAVING DENTAL PROCEDURE THE SECOND WEEK OF JUNE. HE HAD A RIGHT HIP REPLACEMENT WITH DR. CARABALLO IN JANUARY.     PHARMACY: [facility]    CALL BACK # [phone]   Please drink plenty of fluids.  Please get plenty of rest.  Please return here or go to the Emergency Department for any concerns or worsening of condition.  If you were given wait & see antibiotics, please wait 3-5 days before taking them, and only take them if your symptoms have worsened or not improved.  If you do begin taking the antibiotics, please take them to completion.  If you were prescribed antibiotics, please take them to completion.  If you were prescribed a narcotic medication, do not drive or operate heavy equipment or machinery while taking these medications.    You were given a decongestant (RESCON or POLY VENT Dm).  If your insurance does not cover it or you cannot afford it, it is ok to use the over the counter products listed below.  If you do not have Hypertension or any history of palpitations, it is ok to take over the counter Sudafed or Mucinex D or Allegra-D or Claritin-D or Zyrtec-D.  If you do take one of the above, it is ok to combine that with plain over the counter Mucinex or Allegra or Claritin or Zyrtec.  If for example you are taking Zyrtec -D, you can combine that with Mucinex, but not Mucinex-D.  If you are taking Mucinex-D, you can combine that with plain Allegra or Claritin or Zyrtec.   If you do have Hypertension or palpitations, it is safe to take Coricidin HBP for relief of sinus symptoms.    We recommend you take over the counter Flonase (Fluticasone) or another nasally inhaled steroid unless you are already taking one.  Nasal irrigation with a saline spray or Netti Pot like device per their directions is also recommended.  If not allergic, please take over the counter Tylenol (Acetaminophen) and/or Motrin (Ibuprofen) as directed for control of pain and/or fever.    Robitussin DM 2 teas every 4 hours as needed for cough.  If you  smoke, please stop smoking.    You were given an injection of steroid.  This will relieve swelling and inflammation and improve respiratory  symptoms.  It can raise your blood sugar if you are diabetic.    Please follow up with your primary care doctor or specialist as needed.  NOVASURE  None    [facility]    Dr Leonardo Nieto  [address], [place]  [id number] [phone]    Internal Medicine    Dr. Balbir Carmen  [address] [facility] [address]  [place] [address]    [phone]        Acute Bacterial Rhinosinusitis (ABRS)  Acute bacterial rhinosinusitis (ABRS) is an infection of your nasal cavity and sinuses. Its caused by bacteria. Acute means that youve had symptoms for less than 12 weeks.  Understanding your sinuses  The nasal cavity is the large air-filled space behind your nose. The sinuses are a group of spaces formed by the bones of your face. They connect with your nasal cavity. ABRS causes the tissue lining these spaces to become inflamed. Mucus may not drain normally. This leads to facial pain and other symptoms.  What causes ABRS?  ABRS most often follows an upper respiratory infection caused by a virus. Bacteria then infect the lining of your nasal cavity and sinuses. But you can also get ABRS if you have:  · Nasal allergies  · Long-term nasal swelling and congestion not caused by allergies  · Blockage in the nose  Symptoms of ABRS  The symptoms of ABRS may be different for each person, and can include:  · Nasal congestion  · Runny nose  · Fluid draining from the nose down the throat (postnasal drip)  · Headache  · Cough  · Pain in the sinuses  · Thick, colored fluid from the nose (mucus)  · Fever  Diagnosing ABRS  ABRS may be diagnosed if youve had an upper respiratory infection like a cold and cough for longer than 10 to 14 days. Your health care provider will ask about your symptoms and your medical history. The provider will check your vital signs, including your temperature. Youll have a physical exam. The health care provider will check your ears, nose, and throat. You likely wont need any tests. If ABRS comes back,  you may have a culture or other tests.  Treatment for ABRS  Treatment may include:  · Antibiotic medicine. This is for symptoms that last for at least 10 to 14 days.  · Nasal corticosteroid medicine. Drops or spray used in the nose can lessen swelling and congestion.  · Over-the-counter pain medicine. This is to lessen sinus pain and pressure.  · Nasal decongestant medicine. Spray or drops may help to lessen congestion. Do not use them for more than a few days.  · Salt wash (saline irrigation). This can help to loosen mucus.  Possible complications of ABRS  ABRS may come back or become long-term (chronic).  In rare cases, ABRS may cause complications such as:   · Inflamed tissue around the brain and spinal cord (meningitis)  · Inflamed tissue around the eyes (orbital cellulitis)  · Inflamed bones around the sinuses (osteitis)  These problems may need to be treated in a hospital with intravenous (IV) antibiotic medicine or surgery.  When to call the health care provider  Call your health care provider if you have any of the following:  · Symptoms that dont get better, or get worse  · Symptoms that dont get better after 3 to 5 days on antibiotics  · Trouble seeing  · Swelling around your eyes  · Confusion or trouble staying awake   Date Last Reviewed: 3/3/2015  © 6414-0586 [employer]. [address] [place] [address], [place] [address]. All rights reserved. This information is not intended as a substitute for professional medical care. Always follow your healthcare professional's instructions.      Pharyngitis: Strep (Presumed)    You have pharyngitis (sore throat). The cause is thought to be the streptococcus, or strep, bacterium. Strep throat infection can cause throat pain that is worse when swallowing, aching all over, headache, and fever. The infection may be spread by coughing, kissing, or touching others after touching your mouth or nose. Antibiotic medications are given to treat the infection.  Home  care  · Rest at home. Drink plenty of fluids to avoid dehydration.  · No work or school for the first 2 days of taking the antibiotics. After this time, you will not be contagious. You can then return to work or school if you are feeling better.   · The antibiotic medication must be taken for the full 10 days, even if you feel better. This is very important to ensure the infection is treated. It is also important to prevent drug-resistant organisms from developing. If you were given an antibiotic shot, no more antibiotics are needed.  · You may use acetaminophen or ibuprofen to control pain or fever, unless another medicine was prescribed for this. If you have chronic liver or kidney disease or ever had a stomach ulcer or GI bleeding, talk with your doctor before using these medicines.  · Throat lozenges or a throat-numbing sprays can help reduce throat pain. Gargling with warm salt water can also help. Dissolve 1/2 teaspoon of salt in 1 8 ounce glass of warm water.   · Avoid salty or spicy foods, which can irritate the throat.  Follow-up care  Follow up with your healthcare provider or our staff if you are not improving over the next week.  When to seek medical advice  Call your healthcare provider right away if any of these occur:  · Fever as directed by your doctor.   · New or worsening ear pain, sinus pain, or headache  · Painful lumps in the back of neck  · Stiff neck  · Lymph nodes are getting larger  · Inability to swallow liquids, excessive drooling, or inability to open mouth wide due to throat pain  · Signs of dehydration (very dark urine or no urine, sunken eyes, dizziness)  · Trouble breathing or noisy breathing  · Muffled voice  · New rash  Date Last Reviewed: 4/13/2015  © 8052-1302 [employer]. [address] [place] [address], [place] [address]. All rights reserved. This information is not intended as a substitute for professional medical care. Always follow your healthcare professional's  instructions.    Please drink plenty of fluids.  Please get plenty of rest.  Please return here or go to the Emergency Department for any concerns or worsening of condition.    If you were prescribed a narcotic medication, do not drive or operate heavy equipment or machinery while taking these medications.      If not allergic, please take over the counter Tylenol (Acetaminophen) and/or Motrin (Ibuprofen) as directed for control of pain and/or fever.    Please follow up with your primary care doctor or specialist as needed.  NOVASURE  None      Tension Headache    A muscle tension headache is a very common cause of head pain. Its also called a stress headache. When some people are under stress, they tense the muscles of their shoulder, neck, and scalp without knowing it. If this tension lasts long enough, a headache can occur. A tension headache can be quite painful. It can last for hours or even days.  Home care  Follow these tips when caring for yourself at home:  · Dont drive yourself home if you were given pain medicine for your headache. Instead, have someone else drive you home. Try to sleep when you get home. You should feel much better when you wake up.  · Put heat on the back of your neck to help ease neck spasm.  · Drink only clear liquids or eat a light diet until your symptoms get better. This will help you avoid nausea or vomiting.  How to prevent headaches  · Figure out what is causing stress in your life. Learn new ways to handle your stress. Ideas include regular exercise, biofeedback, self-hypnosis, yoga, and meditation. Talk with your healthcare provider to find out more information about managing stress. Many books and digital media are also available on this subject.  · Take time out at the first sign of a tension headache, if possible. Take yourself out of the stressful situation. Find a quiet, comfortable place to sit or lie down and let yourself relax. Heat and deep massage of the tight  "areas in the neck and shoulders may help ease muscle spasm. You may also get relief from a medicine like ibuprofen or a prescribed muscle relaxant.  Follow-up care  Follow up with your healthcare provider, or as advised. Talk with your provider if you have frequent headaches. He or she can figure out a treatment plan. Ask if you can have medicine to take at home the next time you get a bad headache. This may keep you from having to visit the emergency department in the future. You may need to see a headache specialist (neurologist) if you continue to have headaches.  When to seek medical advice  Call your healthcare provider right away if any of these occur:  · Your head pain gets worse during sexual intercourse or strenuous activity  · Your head pain doesnt get better within 24 hours  · You arent able to keep liquids down (repeated vomiting)  · Fever of 100.4ºF (38ºC) or higher, or as directed by your healthcare provider  · Stiff neck  · Extreme drowsiness, confusion, or fainting  · Dizziness or dizziness with spinning sensation (vertigo)  · Weakness in an arm or leg or one side of your face  · You have difficulty speaking  · Your vision changes  Date Last Reviewed: 8/1/2016  © 8223-7154 [employer]. [address] [place] [address], [place] [address]. All rights reserved. This information is not intended as a substitute for professional medical care. Always follow your healthcare professional's instructions.      Headache, Unspecified    A number of things can cause headaches. The cause of your headache isnt clear. But it doesnt seem to be a sign of any serious illness.  You could have a tension headache or a migraine headache.  Stress can cause a tension headache. This can happen if you tense the muscles of your shoulders, neck, and scalp without knowing it. If this stress lasts long enough, you may develop a tension headache.  It is not clear why migraines occur, but certain things called" triggers" can " raise the risk of having a migraine attack. Migraine triggers may include emotional stress or depression, or by hormone changes during the menstrual cycle. Other triggers include birth control pills and other medicines, alcohol or caffeine, foods with tyramine (such as aged cheese, wine), eyestrain, weather changes, missed meals, and lack of sleep or oversleeping.  Other causes of headache include:  · Viral illness with high fever  · Head injury with concussion  · Sinus, ear, or throat infection  · Dental pain and jaw joint (TMJ) pain  More serious but less common causes of headache include stroke, brain hemorrhage, brain tumor, meningitis, and encephalitis.  Home care  Follow these tips when taking care of yourself at home:  · Dont drive yourself home if you were given pain medicine for your headache. Instead, have someone else drive you home. Try to sleep when you get home. You should feel much better when you wake up.  · Apply heat to the back of your neck to ease a neck muscle spasm. Take care of a migraine headache by putting an ice pack on your forehead or at the base of your skull.  · If you have nausea or vomiting, eat a light diet until your headache eases.  · If you have a migraine headache, use sunglasses when in the daylight or around bright indoor lighting until your symptoms get better. Bright glaring light can make this type of headache worse.  Follow-up care  Follow up with your healthcare provider, or as advised. Talk with your provider if you have frequent headaches. He or she can help figure out a treatment plan. By knowing the earliest signs of headache, and starting treatment right away, you may be able to stop the pain yourself.  When to seek medical advice  Call your healthcare provider right away if any of these occur:  · Your head pain suddenly gets worse after sexual intercourse or strenuous activity  · Your head pain doesnt get better within 24 hours  · You arent able to keep liquids  down (repeated vomiting)  · Fever of 100.4ºF (38ºC) or higher, or as directed by your healthcare provider  · Stiff neck  · Extreme drowsiness, confusion, or fainting  · Dizziness or dizziness with spinning sensation (vertigo)  · Weakness in an arm or leg or one side of your face  · You have trouble talking or seeing  Date Last Reviewed: 8/1/2016  © 1979-0004 [employer]. [address] [place] [address], [place] [address]. All rights reserved. This information is not intended as a substitute for professional medical care. Always follow your healthcare professional's instructions.

## 2024-05-21 RX ORDER — CEPHALEXIN 500 MG/1
CAPSULE ORAL
Qty: 4 CAPSULE | Refills: 0 | Status: SHIPPED | OUTPATIENT
Start: 2024-05-21

## 2024-06-14 NOTE — TELEPHONE ENCOUNTER
Patient called stating he is needing an order of antibiotic for his dental appointment for 6/21/24    Please advise, Thank you

## 2024-06-14 NOTE — TELEPHONE ENCOUNTER
Patient requesting an antibiotic for a dental procedure. Patient had a right modified anterior total hip arthroplasty on 1/18/2024.Pended Keflex. Please advise, thank you.     Jill Gagnon CMA ([facility])

## 2024-06-15 RX ORDER — CEPHALEXIN 500 MG/1
CAPSULE ORAL
Qty: 4 CAPSULE | Refills: 0 | Status: SHIPPED | OUTPATIENT
Start: 2024-06-15

## 2024-06-18 RX ORDER — BUMETANIDE 0.5 MG/1
TABLET ORAL
Qty: 90 TABLET | Refills: 3 | Status: SHIPPED | OUTPATIENT
Start: 2024-06-18

## 2024-06-18 NOTE — TELEPHONE ENCOUNTER
Lab Results   Component Value Date    GLUCOSE 96 01/04/2024    CALCIUM 9.5 01/04/2024     01/04/2024    K 3.8 01/18/2024    CO2 29.0 01/04/2024     01/04/2024    BUN 12 01/04/2024    CREATININE 1.08 01/04/2024    EGFR 78.1 01/04/2024    BCR 11.1 01/04/2024    ANIONGAP 8.0 01/04/2024

## 2024-10-21 RX ORDER — BENZONATATE 200 MG/1
1 CAPSULE ORAL 3 TIMES DAILY PRN
COMMUNITY

## 2024-10-21 RX ORDER — PSEUDOEPHEDRINE HCL 30 MG
100 TABLET ORAL
COMMUNITY

## 2024-10-21 RX ORDER — ACETAMINOPHEN 500 MG
500 TABLET ORAL EVERY 6 HOURS PRN
COMMUNITY

## 2024-10-24 NOTE — PROGRESS NOTES
Neuro Office Visit      Encounter Date: 10/25/2024   Patient Name: Tyrell Guzmán  : 1960   MRN: [id number]   PCP: Dr Villalobos  Chief Complaint:    Chief Complaint   Patient presents with    Sleep Apnea     1 year f/u        History of Present Illness: Tyrell Guzmán is a 64 y.o. male who is here today in Neurology for  DIPIKA    Last visit 10/25/23-cont cpap, compression hose  History of Present Illness  The patient presents for evaluation of sleep apnea.         DIPIKA    He reports satisfactory sleep quality, with his CPAP machine functioning well. He continues to use nasal pillows and a chinstrap with his CPAP machine. He typically sleeps for 7 to 8 hours per night but wakes up once during the night to urinate and finds it challenging to fall back asleep. Daytime fatigue occurs only when his sleep schedule is disrupted due to late nights or early mornings. He does not currently wear compression socks but does so while at work. His weight has been fluctuating, but he notes a slight decrease. He is not experiencing any headaches or changes in vision.    He had a hip replacement and is doing great. He has no pain and had no complications after surgery. He is walking well without a cane.    SOCIAL HISTORY  He is still working and planning to retire next year.  No longer falling asleep at the wheel.   Now taking Super B complex.  Working  8-9 hours a day.  Compliant with mask/CPAP and receiving benefit with restorative sleep. He is alert in the morning but slow to move due to right hip OA.   Averaging 7-8 hours a night w CPAP.   12 cm H20  AHI 0.8  Using nasal pillows due to sleeping with mouth open. Now using chin strap. Feels it is beneficial. Some exercise.  Waking up once at 4 am to go to the bathroom. Trouble getting back to sleep.     Wearing compression socks while     PH  D[facility] [employer]     PMH: T2Dm last A1c 6.1. htn, hld, dipika  Subjective      Past Medical History:   Past Medical  History:   Diagnosis Date    Abnormal EKG     Bilateral lower extremity edema.     Colon cancer     colectomy    CPAP (continuous positive airway pressure) dependence     Diabetes mellitus     Enlarged prostate     Hip arthrosis Rd    History of chemotherapy     x6 months.    History of radiation therapy 06/18/2020    prostate bed s/p prostatectomy    Hypertension     Obesity     Prostate cancer     Sleep apnea     Wears glasses        Past Surgical History:   Past Surgical History:   Procedure Laterality Date    COLECTOMY PARTIAL / TOTAL      COLONOSCOPY  2017    PROSTATECTOMY N/A 01/10/2020    Procedure: PROSTATECTOMY LAPAROSCOPIC WITH DAVINCI ROBOT;  Surgeon: Andrés Dietz MD;  Location:  [facility];  Service: DaVinci    TOTAL HIP ARTHROPLASTY Right 1/18/2024    Procedure: MODIFIED ANTERIOR TOTAL HIP ARTHROPLASTY - RIGHT;  Surgeon: Shaq High MD;  Location:  [facility];  Service: Orthopedics;  Laterality: Right;    TUMOR REMOVAL Right 2016    Shoulder- Benign    VASECTOMY         Family History:   Family History   Problem Relation Age of Onset    Heart disease Mother     Stroke Mother     Diabetes Father     Stroke Father     Cancer Father     Heart disease Father     Hypertension Father     Diabetes Sister     Stroke Sister     Heart attack Sister     Diabetes Sister     Diabetes Sister     Diabetes Brother     Stroke Brother     Hyperlipidemia Other        Social History:   Social History     Socioeconomic History    Marital status:    Tobacco Use    Smoking status: Never     Passive exposure: Past    Smokeless tobacco: Never   Vaping Use    Vaping status: Never Used   Substance and Sexual Activity    Alcohol use: Yes     Alcohol/week: 3.0 standard drinks of alcohol     Types: 2 Cans of beer, 1 Drinks containing 0.5 oz of alcohol per week    Drug use: No    Sexual activity: Yes     Partners: Female     Birth control/protection: None, Vasectomy       Medications:     Current Outpatient  Medications:     acetaminophen (TYLENOL) 500 MG tablet, Take 1 tablet by mouth Every 6 (Six) Hours As Needed., Disp: , Rfl:     aspirin (ASPIR) 81 MG EC tablet, Take 1 tablet by mouth 2 (Two) Times a Day., Disp: 60 tablet, Rfl: 0    B Complex Vitamins (VITAMIN B COMPLEX PO), Take 1 tablet by mouth Daily., Disp: , Rfl:     bumetanide (BUMEX) 0.5 MG tablet, TAKE 1 TABLET DAILY, Disp: 90 tablet, Rfl: 3    cephalexin (KEFLEX) 500 MG capsule, Take 4 capsules 1 hour prior to dental procedure, Disp: 4 capsule, Rfl: 0    Chlorhexidine Gluconate 4 % solution, Shower with hibiclens solution as directed for 5 days prior to surgery., Disp: 237 mL, Rfl: 0    cholecalciferol (VITAMIN D3) 1.25 MG (44278 UT) capsule, Take 1 capsule by mouth Every 7 (Seven) Days., Disp: , Rfl:     Continuous Glucose Sensor (FreeStyle Hebert 3 Plus Sensor), , Disp: , Rfl:     docusate sodium 100 MG capsule, Take 1 capsule by mouth., Disp: , Rfl:     glucose blood test strip, See Admin Instructions., Disp: , Rfl:     losartan (COZAAR) 100 MG tablet, Take 1 tablet by mouth Daily., Disp: , Rfl:     meloxicam (MOBIC) 15 MG tablet, Take 1 tablet by mouth Daily As Needed for Moderate Pain or Mild Pain., Disp: 30 tablet, Rfl: 0    Multiple Vitamins-Minerals (CENTRUM SILVER 50+MEN) tablet, Take 1 tablet by mouth Daily., Disp: , Rfl:     rosuvastatin (CRESTOR) 5 MG tablet, TAKE 1 TABLET DAILY (Patient taking differently: Take 1 tablet by mouth Daily.), Disp: 90 tablet, Rfl: 3    triamterene-hydrochlorothiazide (MAXZIDE) 75-50 MG per tablet, TAKE 1 TABLET DAILY (Patient taking differently: Take 1 tablet by mouth Daily.), Disp: 90 tablet, Rfl: 3    Trulicity 4.5 MG/0.5ML solution pen-injector, Inject 0.5 mL as directed 1 (One) Time Per Week. Saturday, Disp: , Rfl:     benzonatate (TESSALON) 200 MG capsule, Take 1 capsule by mouth 3 (Three) Times a Day As Needed. (Patient not taking: Reported on 10/25/2024), Disp: , Rfl:     Allergies:   Allergies   Allergen  "Reactions    Lisinopril Cough       PHQ-9 Total Score:     [facility] Fall Risk Assessment has not been completed.    Objective     Physical Exam:   Physical Exam  Eyes:      Extraocular Movements: No nystagmus.   Neurological:      Motor: Motor strength is normal.     Coordination: Coordination is intact.   Psychiatric:         Speech: Speech normal.         Neurological Exam  Mental Status  Awake, alert and oriented to person, place and time. Recent and remote memory are intact. Speech is normal. Follows complex commands. Attention and concentration are normal. Fund of knowledge is appropriate for level of education.    Cranial Nerves  CN III, IV, VI: No nystagmus. Normal saccades. Normal smooth pursuit.   Right pupil: Round.   Left pupil: Round.  CN V: Facial sensation is normal.  CN VII: Full and symmetric facial movement.    Motor  Normal muscle bulk throughout. No fasciculations present. Normal muscle tone. No abnormal involuntary movements. Strength is 5/5 throughout all four extremities.    Sensory  Sensation is intact to light touch, pinprick, vibration and proprioception in all four extremities.    Coordination    Finger-to-nose, rapid alternating movements and heel-to-shin normal bilaterally without dysmetria.    Gait  Casual gait is normal including stance, stride, and arm swing.     Physical Exam        Vital Signs:   Vitals:    10/25/24 0834   BP: 132/82   BP Location: Right arm   Patient Position: Sitting   Cuff Size: Adult   Pulse: 67   Resp: 18   Temp: 97.1 °F (36.2 °C)   TempSrc: Temporal   SpO2: 95%   Weight: (!) 137 kg (303 lb)   Height: 174 cm (68.5\")   PainSc: 0-No pain     Body mass index is 45.4 kg/m².         Assessment / Plan      Assessment/Plan:   Diagnoses and all orders for this visit:    1. DIPIKA (obstructive sleep apnea) (Primary)  Comments:  Cont cpap and supplies    2. Class 3 severe obesity due to excess calories with serious comorbidity and body mass index (BMI) of 45.0 to 49.9 in " adult  Comments:  Encourage weight loss       Assessment & Plan  1. Sleep Apnea.  The patient reports good sleep quality and effective use of his CPAP machine with nasal pillows and a chinstrap. He is experiencing some difficulty returning to sleep after waking up once at night to use the bathroom. He is advised to continue using his CPAP machine as prescribed. If his weight decreases significantly, adjustments to the CPAP pressure may be considered.    2. Post-Hip Replacement Status.  The patient reports no pain and no complications following his hip replacement surgery. He is walking well without the use of a cane. No further treatment is required at this time.          Patient Education:       Reviewed medications, potential side effects and signs and symptoms to report. Discussed risk versus benefits of treatment plan with patient and/or family-including medications, labs and radiology that may be ordered. Addressed questions and concerns during visit. Patient and/or family verbalized understanding and agree with plan. Instructed to call the office with any questions and report to ER with any life-threatening symptoms.     Follow Up:   Return in about 1 year (around 10/25/2025) for Recheck.    During this visit the following were done:  Labs Reviewed []    Labs Ordered []    Radiology Reports Reviewed []    Radiology Ordered []    PCP Records Reviewed []    Referring Provider Records Reviewed []    ER Records Reviewed []    Hospital Records Reviewed []    History Obtained From Family []    Radiology Images Reviewed []    Other Reviewed [x]    Records Requested []      Patient or patient representative verbalized consent for the use of Ambient Listening during the visit with  Alton Lomas DNP, APRTIFFANIE for chart documentation. 10/25/2024  15:26 EDT      Alton Lomas DNP, APRN

## 2024-10-25 ENCOUNTER — OFFICE VISIT (OUTPATIENT)
Dept: NEUROLOGY | Facility: CLINIC | Age: 64
End: 2024-10-25
Payer: COMMERCIAL

## 2024-10-25 VITALS
HEIGHT: 69 IN | HEART RATE: 67 BPM | SYSTOLIC BLOOD PRESSURE: 132 MMHG | DIASTOLIC BLOOD PRESSURE: 82 MMHG | OXYGEN SATURATION: 95 % | RESPIRATION RATE: 18 BRPM | WEIGHT: 303 LBS | TEMPERATURE: 97.1 F | BODY MASS INDEX: 44.88 KG/M2

## 2024-10-25 DIAGNOSIS — E66.01 CLASS 3 SEVERE OBESITY DUE TO EXCESS CALORIES WITH SERIOUS COMORBIDITY AND BODY MASS INDEX (BMI) OF 45.0 TO 49.9 IN ADULT: ICD-10-CM

## 2024-10-25 DIAGNOSIS — E66.813 CLASS 3 SEVERE OBESITY DUE TO EXCESS CALORIES WITH SERIOUS COMORBIDITY AND BODY MASS INDEX (BMI) OF 45.0 TO 49.9 IN ADULT: ICD-10-CM

## 2024-10-25 DIAGNOSIS — G47.33 OSA (OBSTRUCTIVE SLEEP APNEA): Primary | ICD-10-CM

## 2024-10-25 PROCEDURE — 99213 OFFICE O/P EST LOW 20 MIN: CPT | Performed by: NURSE PRACTITIONER

## 2024-10-25 RX ORDER — BLOOD-GLUCOSE SENSOR
EACH MISCELLANEOUS
COMMUNITY
Start: 2024-09-10

## 2024-10-25 NOTE — LETTER
2024     Shan Villalobos MD  [address]  [address]  [facility] [address]    Patient: Tyrell Guzmán   YOB: 1960   Date of Visit: 10/25/2024     Dear Shan Villalobos MD:       Thank you for referring Tyrell Guzmán to me for evaluation. Below are the relevant portions of my assessment and plan of care.    If you have questions, please do not hesitate to call me. I look forward to following Tyrell along with you.         Sincerely,        Alton Lomas DNP, JER        CC: No Recipients    Alton Lomas DNP, JER  10/25/24 0856  Signed     Neuro Office Visit      Encounter Date: 10/25/2024   Patient Name: Tyrell Guzmán  : 1960   MRN: [id number]   PCP: Dr Villalobos  Chief Complaint:    Chief Complaint   Patient presents with   • Sleep Apnea     1 year f/u        History of Present Illness: Tyrell Guzmán is a 64 y.o. male who is here today in Neurology for  DIPIKA    Last visit 10/25/23-cont cpap, compression hose  History of Present Illness  The patient presents for evaluation of sleep apnea.         DIPIKA    He reports satisfactory sleep quality, with his CPAP machine functioning well. He continues to use nasal pillows and a chinstrap with his CPAP machine. He typically sleeps for 7 to 8 hours per night but wakes up once during the night to urinate and finds it challenging to fall back asleep. Daytime fatigue occurs only when his sleep schedule is disrupted due to late nights or early mornings. He does not currently wear compression socks but does so while at work. His weight has been fluctuating, but he notes a slight decrease. He is not experiencing any headaches or changes in vision.    He had a hip replacement and is doing great. He has no pain and had no complications after surgery. He is walking well without a cane.    SOCIAL HISTORY  He is still working and planning to retire next year.  No longer falling asleep at  the wheel.   Now taking Super B complex.  Working  8-9 hours a day.  Compliant with mask/CPAP and receiving benefit with restorative sleep. He is alert in the morning but slow to move due to right hip OA.   Averaging 7-8 hours a night w CPAP.   12 cm H20  AHI 0.8  Using nasal pillows due to sleeping with mouth open. Now using chin strap. Feels it is beneficial. Some exercise.  Waking up once at 4 am to go to the bathroom. Trouble getting back to sleep.     Wearing compression socks while     PH  D[facility] [employer]     PMH: T2Dm last A1c 6.1. htn, hld, sirisha  Subjective      Past Medical History:   Past Medical History:   Diagnosis Date   • Abnormal EKG    • Bilateral lower extremity edema.    • Colon cancer     colectomy   • CPAP (continuous positive airway pressure) dependence    • Diabetes mellitus    • Enlarged prostate    • Hip arthrosis Rd   • History of chemotherapy     x6 months.   • History of radiation therapy 06/18/2020    prostate bed s/p prostatectomy   • Hypertension    • Obesity    • Prostate cancer    • Sleep apnea    • Wears glasses        Past Surgical History:   Past Surgical History:   Procedure Laterality Date   • COLECTOMY PARTIAL / TOTAL     • COLONOSCOPY  2017   • PROSTATECTOMY N/A 01/10/2020    Procedure: PROSTATECTOMY LAPAROSCOPIC WITH DAVINCI ROBOT;  Surgeon: Andrés Dietz MD;  Location:  [facility];  Service: DaVinci   • TOTAL HIP ARTHROPLASTY Right 1/18/2024    Procedure: MODIFIED ANTERIOR TOTAL HIP ARTHROPLASTY - RIGHT;  Surgeon: Shaq High MD;  Location:  [facility];  Service: Orthopedics;  Laterality: Right;   • TUMOR REMOVAL Right 2016    Shoulder- Benign   • VASECTOMY         Family History:   Family History   Problem Relation Age of Onset   • Heart disease Mother    • Stroke Mother    • Diabetes Father    • Stroke Father    • Cancer Father    • Heart disease Father    • Hypertension Father    • Diabetes Sister    • Stroke Sister    • Heart attack Sister    • Diabetes  Sister    • Diabetes Sister    • Diabetes Brother    • Stroke Brother    • Hyperlipidemia Other        Social History:   Social History     Socioeconomic History   • Marital status:    Tobacco Use   • Smoking status: Never     Passive exposure: Past   • Smokeless tobacco: Never   Vaping Use   • Vaping status: Never Used   Substance and Sexual Activity   • Alcohol use: Yes     Alcohol/week: 3.0 standard drinks of alcohol     Types: 2 Cans of beer, 1 Drinks containing 0.5 oz of alcohol per week   • Drug use: No   • Sexual activity: Yes     Partners: Female     Birth control/protection: None, Vasectomy       Medications:     Current Outpatient Medications:   •  acetaminophen (TYLENOL) 500 MG tablet, Take 1 tablet by mouth Every 6 (Six) Hours As Needed., Disp: , Rfl:   •  aspirin (ASPIR) 81 MG EC tablet, Take 1 tablet by mouth 2 (Two) Times a Day., Disp: 60 tablet, Rfl: 0  •  B Complex Vitamins (VITAMIN B COMPLEX PO), Take 1 tablet by mouth Daily., Disp: , Rfl:   •  bumetanide (BUMEX) 0.5 MG tablet, TAKE 1 TABLET DAILY, Disp: 90 tablet, Rfl: 3  •  cephalexin (KEFLEX) 500 MG capsule, Take 4 capsules 1 hour prior to dental procedure, Disp: 4 capsule, Rfl: 0  •  Chlorhexidine Gluconate 4 % solution, Shower with hibiclens solution as directed for 5 days prior to surgery., Disp: 237 mL, Rfl: 0  •  cholecalciferol (VITAMIN D3) 1.25 MG (79381 UT) capsule, Take 1 capsule by mouth Every 7 (Seven) Days., Disp: , Rfl:   •  Continuous Glucose Sensor (FreeStyle Hebert 3 Plus Sensor), , Disp: , Rfl:   •  docusate sodium 100 MG capsule, Take 1 capsule by mouth., Disp: , Rfl:   •  glucose blood test strip, See Admin Instructions., Disp: , Rfl:   •  losartan (COZAAR) 100 MG tablet, Take 1 tablet by mouth Daily., Disp: , Rfl:   •  meloxicam (MOBIC) 15 MG tablet, Take 1 tablet by mouth Daily As Needed for Moderate Pain or Mild Pain., Disp: 30 tablet, Rfl: 0  •  Multiple Vitamins-Minerals (CENTRUM SILVER 50+MEN) tablet, Take 1 tablet  by mouth Daily., Disp: , Rfl:   •  rosuvastatin (CRESTOR) 5 MG tablet, TAKE 1 TABLET DAILY (Patient taking differently: Take 1 tablet by mouth Daily.), Disp: 90 tablet, Rfl: 3  •  triamterene-hydrochlorothiazide (MAXZIDE) 75-50 MG per tablet, TAKE 1 TABLET DAILY (Patient taking differently: Take 1 tablet by mouth Daily.), Disp: 90 tablet, Rfl: 3  •  Trulicity 4.5 MG/0.5ML solution pen-injector, Inject 0.5 mL as directed 1 (One) Time Per Week. Saturday, Disp: , Rfl:   •  benzonatate (TESSALON) 200 MG capsule, Take 1 capsule by mouth 3 (Three) Times a Day As Needed. (Patient not taking: Reported on 10/25/2024), Disp: , Rfl:     Allergies:   Allergies   Allergen Reactions   • Lisinopril Cough       PHQ-9 Total Score:     [facility] Fall Risk Assessment has not been completed.    Objective     Physical Exam:   Physical Exam  Eyes:      Extraocular Movements: No nystagmus.   Neurological:      Motor: Motor strength is normal.     Coordination: Coordination is intact.   Psychiatric:         Speech: Speech normal.         Neurological Exam  Mental Status  Awake, alert and oriented to person, place and time. Recent and remote memory are intact. Speech is normal. Follows complex commands. Attention and concentration are normal. Fund of knowledge is appropriate for level of education.    Cranial Nerves  CN III, IV, VI: No nystagmus. Normal saccades. Normal smooth pursuit.   Right pupil: Round.   Left pupil: Round.  CN V: Facial sensation is normal.  CN VII: Full and symmetric facial movement.    Motor  Normal muscle bulk throughout. No fasciculations present. Normal muscle tone. No abnormal involuntary movements. Strength is 5/5 throughout all four extremities.    Sensory  Sensation is intact to light touch, pinprick, vibration and proprioception in all four extremities.    Coordination    Finger-to-nose, rapid alternating movements and heel-to-shin normal bilaterally without dysmetria.    Gait  Casual gait is normal including  "stance, stride, and arm swing.     Physical Exam        Vital Signs:   Vitals:    10/25/24 0834   BP: 132/82   BP Location: Right arm   Patient Position: Sitting   Cuff Size: Adult   Pulse: 67   Resp: 18   Temp: 97.1 °F (36.2 °C)   TempSrc: Temporal   SpO2: 95%   Weight: (!) 137 kg (303 lb)   Height: 174 cm (68.5\")   PainSc: 0-No pain     Body mass index is 45.4 kg/m².         Assessment / Plan      Assessment/Plan:   Diagnoses and all orders for this visit:    1. DIPIKA (obstructive sleep apnea) (Primary)  Comments:  Cont cpap and supplies    2. Class 3 severe obesity due to excess calories with serious comorbidity and body mass index (BMI) of 45.0 to 49.9 in adult  Comments:  Encourage weight loss       Assessment & Plan  1. Sleep Apnea.  The patient reports good sleep quality and effective use of his CPAP machine with nasal pillows and a chinstrap. He is experiencing some difficulty returning to sleep after waking up once at night to use the bathroom. He is advised to continue using his CPAP machine as prescribed. If his weight decreases significantly, adjustments to the CPAP pressure may be considered.    2. Post-Hip Replacement Status.  The patient reports no pain and no complications following his hip replacement surgery. He is walking well without the use of a cane. No further treatment is required at this time.          Patient Education:       Reviewed medications, potential side effects and signs and symptoms to report. Discussed risk versus benefits of treatment plan with patient and/or family-including medications, labs and radiology that may be ordered. Addressed questions and concerns during visit. Patient and/or family verbalized understanding and agree with plan. Instructed to call the office with any questions and report to ER with any life-threatening symptoms.     Follow Up:   Return in about 1 year (around 10/25/2025) for Recheck.    During this visit the following were done:  Labs Reviewed []  "   Labs Ordered []    Radiology Reports Reviewed []    Radiology Ordered []    PCP Records Reviewed []    Referring Provider Records Reviewed []    ER Records Reviewed []    Hospital Records Reviewed []    History Obtained From Family []    Radiology Images Reviewed []    Other Reviewed [x]    Records Requested []      Patient or patient representative verbalized consent for the use of Ambient Listening during the visit with  Alton Lomas DNP, APRN for chart documentation. 10/25/2024  15:26 EDT      Alton Lomas DNP, APRN

## 2024-11-13 ENCOUNTER — PATIENT ROUNDING (BHMG ONLY) (OUTPATIENT)
Dept: CARDIOLOGY | Facility: CLINIC | Age: 64
End: 2024-11-13
Payer: COMMERCIAL

## 2024-11-13 ENCOUNTER — OFFICE VISIT (OUTPATIENT)
Dept: CARDIOLOGY | Facility: CLINIC | Age: 64
End: 2024-11-13
Payer: COMMERCIAL

## 2024-11-13 VITALS
HEIGHT: 69 IN | DIASTOLIC BLOOD PRESSURE: 72 MMHG | HEART RATE: 62 BPM | BODY MASS INDEX: 45.71 KG/M2 | SYSTOLIC BLOOD PRESSURE: 134 MMHG | OXYGEN SATURATION: 96 % | WEIGHT: 308.6 LBS

## 2024-11-13 DIAGNOSIS — R60.9 DEPENDENT EDEMA: ICD-10-CM

## 2024-11-13 DIAGNOSIS — E78.5 DYSLIPIDEMIA: ICD-10-CM

## 2024-11-13 DIAGNOSIS — Z82.49 FAMILY HISTORY OF EARLY CAD: ICD-10-CM

## 2024-11-13 DIAGNOSIS — I10 ESSENTIAL HYPERTENSION: Primary | ICD-10-CM

## 2024-11-13 PROCEDURE — 99214 OFFICE O/P EST MOD 30 MIN: CPT | Performed by: PHYSICIAN ASSISTANT

## 2024-11-13 RX ORDER — BUMETANIDE 0.5 MG/1
0.5 TABLET ORAL DAILY
Qty: 90 TABLET | Refills: 3 | Status: SHIPPED | OUTPATIENT
Start: 2024-11-13

## 2024-11-13 RX ORDER — ROSUVASTATIN CALCIUM 5 MG/1
5 TABLET, COATED ORAL DAILY
Qty: 90 TABLET | Refills: 3 | Status: SHIPPED | OUTPATIENT
Start: 2024-11-13

## 2024-11-13 RX ORDER — TRIAMTERENE AND HYDROCHLOROTHIAZIDE 75; 50 MG/1; MG/1
1 TABLET ORAL DAILY
Qty: 90 TABLET | Refills: 3 | Status: SHIPPED | OUTPATIENT
Start: 2024-11-13

## 2024-11-13 NOTE — PROGRESS NOTES
[facility] Cardiology    Patient ID: Tyrell Guzmán is a 64 y.o. male.  : 1960   Contact: [phone]    Encounter date: 2024    PCP: Shan Villalobos MD      Chief complaint:   Chief Complaint   Patient presents with    Essential hypertension       Problem List:  Abnormal EKG revealing poor anterior R-wave progression, inferior lateral T-wave inversion:  Reportedly normal stress test,  -- data deficit.  Echocardiogram, 2008: EF > 55%, trace MR/TR with normal RVSP.  Cardiolite GXT, 2008: EF 66% with no inducible ischemia.  Echocardiogram, 2014: Normal EF. Minimally reduced exercise tolerance.  Stress echo, 2017: EF 60%. Excellent exercise tolerance, expected 09:40 and actual duration of 10:16. Normal BP and HR response to exercise. Abnormal baseline EKG which became more abnormal with exercise as expected. No evidence of inducible ischemia.  Bilateral lower extremity edema.  Venous duplex, 2023: There is deep venous insufficiency of the right lower extremity in the following vessel(s): deep femoral vein. There is superficial venous insufficiency of the right lower extremity in the following vessel(s): great saphenous vein at thigh, great saphenous vein at knee and great saphenous vein at calf.  Venous duplex, 2024: No evidence of DVT.  Hypertension.  Diabetes mellitus.  Obesity.  Colon cancer:  Colectomy, 2010, Dr. Davis.  Chemotherapy x6 months.  Surgical history:  Vasectomy.  Colectomy.    Allergies   Allergen Reactions    Lisinopril Cough       Current Medications:    Current Outpatient Medications:     acetaminophen (TYLENOL) 500 MG tablet, Take 1 tablet by mouth Every 6 (Six) Hours As Needed. (Patient taking differently: Take 1 tablet by mouth As Needed.), Disp: , Rfl:     B Complex Vitamins (VITAMIN B COMPLEX PO), Take 1 tablet by mouth Daily., Disp: , Rfl:     benzonatate (TESSALON) 200 MG capsule, Take 1  capsule by mouth 3 (Three) Times a Day As Needed., Disp: , Rfl:     bumetanide (BUMEX) 0.5 MG tablet, TAKE 1 TABLET DAILY, Disp: 90 tablet, Rfl: 3    cephalexin (KEFLEX) 500 MG capsule, Take 4 capsules 1 hour prior to dental procedure, Disp: 4 capsule, Rfl: 0    cholecalciferol (VITAMIN D3) 1.25 MG (53297 UT) capsule, Take 1 capsule by mouth Every 7 (Seven) Days., Disp: , Rfl:     Continuous Glucose Sensor (FreeStyle Hebert 3 Plus Sensor), , Disp: , Rfl:     glucose blood test strip, See Admin Instructions., Disp: , Rfl:     losartan (COZAAR) 100 MG tablet, Take 1 tablet by mouth Daily., Disp: , Rfl:     meloxicam (MOBIC) 15 MG tablet, Take 1 tablet by mouth Daily As Needed for Moderate Pain or Mild Pain., Disp: 30 tablet, Rfl: 0    Multiple Vitamins-Minerals (CENTRUM SILVER 50+MEN) tablet, Take 1 tablet by mouth Daily., Disp: , Rfl:     rosuvastatin (CRESTOR) 5 MG tablet, TAKE 1 TABLET DAILY (Patient taking differently: Take 1 tablet by mouth Daily.), Disp: 90 tablet, Rfl: 3    triamterene-hydrochlorothiazide (MAXZIDE) 75-50 MG per tablet, TAKE 1 TABLET DAILY (Patient taking differently: Take 1 tablet by mouth Daily.), Disp: 90 tablet, Rfl: 3    Trulicity 4.5 MG/0.5ML solution pen-injector, Inject 0.5 mL as directed 1 (One) Time Per Week. Saturday, Disp: , Rfl:     aspirin (ASPIR) 81 MG EC tablet, Take 1 tablet by mouth 2 (Two) Times a Day. (Patient not taking: Reported on 11/13/2024), Disp: 60 tablet, Rfl: 0    Chlorhexidine Gluconate 4 % solution, Shower with hibiclens solution as directed for 5 days prior to surgery. (Patient not taking: Reported on 11/13/2024), Disp: 237 mL, Rfl: 0    docusate sodium 100 MG capsule, Take 1 capsule by mouth. (Patient not taking: Reported on 11/13/2024), Disp: , Rfl:     HPI    Tyrell Guzmán is a 64 y.o. male who presents today for a follow up of hypertension, lower extremity edema, family history of coronary disease and cardiac risk factors. Since last visit, patient  "overall has been doing well.  He still struggles with lower extremity edema.  He wears compression stockings on a daily basis which helped with the swelling significantly.  He states that he has swelling that progresses throughout the day while he is on his feet but improves with elevation of his feet.  Patient denies any chest pain or shortness of breath.  Patient still is not staying very active due to his arthritis.  Patient does state his diabetes is improving.  He does check his blood pressure every once in a while and normally it is in the 130s.  Cholesterol was last checked in December and his LDL is 68.      The following portions of the patient's history were reviewed and updated as appropriate: allergies, current medications and problem list.    Pertinent positives as listed in the HPI.  All other systems reviewed are negative.         Vitals:    11/13/24 0933   BP: 134/72   BP Location: Right arm   Patient Position: Sitting   Pulse: 62   SpO2: 96%   Weight: (!) 140 kg (308 lb 9.6 oz)   Height: 175.3 cm (69\")       Physical Exam:  General: No acute distress, obese  Neck: Jugular venous pressure is within normal limits. Carotids have normal upstrokes without bruits.   Cardiovascular: Heart has a nondisplaced focal PMI. Regular rate and rhythm. No murmur, gallop or rub.  Lungs: Clear, no rales or wheezes. Equal expansion is noted.   Extremities: Trace bilateral lower extremity edema.  Skin: Warm and dry.  Neurologic: Nonfocal.     Diagnostic Data (reviewed with patient):  Lab date: 12/04/2023  FLP: , TG 64, HDL 36, LDL 68  HbA1c: 5.9 (01/04/2024)    Lab Results   Component Value Date    GLUCOSE 96 01/04/2024    BUN 12 01/04/2024    CREATININE 1.08 01/04/2024    BCR 11.1 01/04/2024     01/04/2024    K 3.8 01/18/2024     01/04/2024    CO2 29.0 01/04/2024    CALCIUM 9.5 01/04/2024     Lab Results   Component Value Date    WBC 6.31 01/04/2024    RBC 5.34 01/04/2024    HGB 15.0 01/04/2024    HCT " 45.6 01/04/2024    MCV 85.4 01/04/2024     01/04/2024     Advance Care Planning   ACP discussion was declined by the patient. Patient does not have an advance directive, declines further assistance.             Assessment:    ICD-10-CM ICD-9-CM   1. Essential hypertension  I10 401.9   2. Dyslipidemia  E78.5 272.4   3. Family history of early CAD  Z82.49 V17.3   4. Dependent edema  R60.9 782.3         Plan:  Stable cardiac status with no signs of angina.  I did discuss at length the patient's elevated risk given his multiple comorbidities.  At this time I would recommend getting an echo coronary calcium score to further risk stratify him especially since he has family history of CAD.  Continue on losartan 100 mg daily and Maxide 75-50 daily for hypertension.  Patient is is to continue to check his blood pressure at home and if it is becoming elevated to contact us for further adjustments.  Continue on Bumex 0.5 mg daily for fluid retention.   Continue on rosuvastatin 5 mg daily for hyperlipidemia.   Continue all other current medications.  F/up in 12 months, sooner if needed.      Shira Trent PA-C

## 2024-11-13 NOTE — PROGRESS NOTES
November 13, 2024    Hello, may I speak with Tyrell Guzmán?    My name is RASHARD      I am  with [facility]  [address]  [facility] [address] [phone].    Before we get started may I verify your date of birth? 1960    I am calling to officially welcome you to our practice and ask about your recent visit. Is this a good time to talk? yes    Tell me about your visit with us. What things went well?  EVERYTHING       We're always looking for ways to make our patients' experiences even better. Do you have recommendations on ways we may improve?  no    Overall were you satisfied with your first visit to our practice? yes       I appreciate you taking the time to speak with me today. Is there anything else I can do for you? no      Thank you, and have a great day.

## 2024-11-25 ENCOUNTER — TELEPHONE (OUTPATIENT)
Dept: ORTHOPEDIC SURGERY | Facility: CLINIC | Age: 64
End: 2024-11-25
Payer: COMMERCIAL

## 2024-11-25 NOTE — TELEPHONE ENCOUNTER
"Caller: Tyrell Guzmán \"Jose\"    Relationship to patient: Self    Best call back number: [phone]    Patient is needing: PATIENT IS HAVING DENTAL WORK DONE 12/06/24 AND NEEDS AN ANTIBIOTIC CALLED IN TO [facility] - PLEASE REACH OUT AND ADVISE ONCE SENT           "

## 2024-11-25 NOTE — TELEPHONE ENCOUNTER
Marija-can you sign off on dental antibiotics for this Dr. High patient? Thank you!    Aria ALVARENGA CMA ([facility]), ROT

## 2024-11-26 RX ORDER — CEPHALEXIN 500 MG/1
CAPSULE ORAL
Qty: 4 CAPSULE | Refills: 0 | Status: SHIPPED | OUTPATIENT
Start: 2024-11-26

## 2025-01-13 ENCOUNTER — OFFICE VISIT (OUTPATIENT)
Dept: ORTHOPEDIC SURGERY | Facility: CLINIC | Age: 65
End: 2025-01-13
Payer: COMMERCIAL

## 2025-01-13 VITALS
BODY MASS INDEX: 45.77 KG/M2 | HEIGHT: 69 IN | WEIGHT: 309 LBS | SYSTOLIC BLOOD PRESSURE: 134 MMHG | DIASTOLIC BLOOD PRESSURE: 84 MMHG

## 2025-01-13 DIAGNOSIS — E66.813 CLASS 3 SEVERE OBESITY DUE TO EXCESS CALORIES WITHOUT SERIOUS COMORBIDITY WITH BODY MASS INDEX (BMI) OF 45.0 TO 49.9 IN ADULT: ICD-10-CM

## 2025-01-13 DIAGNOSIS — Z96.641 S/P TOTAL RIGHT HIP ARTHROPLASTY: Primary | ICD-10-CM

## 2025-01-13 DIAGNOSIS — E66.01 CLASS 3 SEVERE OBESITY DUE TO EXCESS CALORIES WITHOUT SERIOUS COMORBIDITY WITH BODY MASS INDEX (BMI) OF 45.0 TO 49.9 IN ADULT: ICD-10-CM

## 2025-01-13 PROCEDURE — 99212 OFFICE O/P EST SF 10 MIN: CPT | Performed by: ORTHOPAEDIC SURGERY

## 2025-01-13 NOTE — PROGRESS NOTES
[facility] [place]    Subjective     Chief Complaint   Patient presents with    Follow-up     9 month f/u -- 1 year S/P MODIFIED ANTERIOR TOTAL HIP ARTHROPLASTY - RIGHT (DOS: 1/18/24)        HPI    It has been 9  month(s) since Mr. Guzmán's last visit. He returns to clinic today for postoperative follow-up of right hip arthroplasty. The issue has been ongoing for 1 year(s). He rates his pain a 0/10 on the pain scale.  Overall, he is doing better.  100% improvement compared to his preoperative symptoms.  Fully ambulatory without external aids.  Pleased with the results.      I have reviewed the following portions of the patient's history and agree with: History of Present Illness and Review of Systems    Patient Active Problem List   Diagnosis    Acute bronchitis    Obstructive apnea    Essential hypertension    Bilateral lower extremity edema.    Abnormal EKG    Obesity    History of colon cancer    Prostate cancer    Diabetes    Status post prostatectomy, lap robotic    Adenomatous duodenal polyp    Eczema    Encounter for drug therapy    History of adenocarcinoma of prostate    Primary osteoarthritis of right hip    Raised prostate specific antigen    Type 2 diabetes mellitus with hyperglycemia, without long-term current use of insulin    Hypertension    Morbid obesity with body mass index (BMI) of 45.0 to 49.9 in adult    Obesity    Obstructive sleep apnea    Malignant tumor of prostate    Degenerative arthritis of hip    Dyslipidemia    Pain of right hip joint    Morbid obesity with body mass index (BMI) of 40.0 or higher    High prostate specific antigen (PSA)    Status post total hip replacement, right     Past Medical History:   Diagnosis Date    Abnormal EKG     Bilateral lower extremity edema.     Colon cancer     colectomy    CPAP (continuous positive airway pressure) dependence     Diabetes mellitus     Enlarged prostate     Hip arthrosis Rd    History of chemotherapy     x6  months.    History of radiation therapy 06/18/2020    prostate bed s/p prostatectomy    Hypertension     Obesity     Prostate cancer     Sleep apnea     Wears glasses       Past Surgical History:   Procedure Laterality Date    COLECTOMY PARTIAL / TOTAL      COLONOSCOPY  2017    PROSTATECTOMY N/A 01/10/2020    Procedure: PROSTATECTOMY LAPAROSCOPIC WITH DAVINCI ROBOT;  Surgeon: Andrés Dietz MD;  Location:  [facility];  Service: DaVinci    TOTAL HIP ARTHROPLASTY Right 01/18/2024    Procedure: MODIFIED ANTERIOR TOTAL HIP ARTHROPLASTY - RIGHT;  Surgeon: Shaq High MD;  Location:  [facility];  Service: Orthopedics;  Laterality: Right;    TUMOR REMOVAL Right 2016    Shoulder- Benign    VASECTOMY      VEIN SURGERY        Family History   Problem Relation Age of Onset    Heart disease Mother     Stroke Mother     Diabetes Father     Stroke Father     Cancer Father     Heart disease Father     Hypertension Father     Diabetes Sister     Stroke Sister     Heart attack Sister     Diabetes Sister     Diabetes Sister     Diabetes Brother     Stroke Brother     Hypertension Brother     Hyperlipidemia Other      Social History     Socioeconomic History    Marital status:    Tobacco Use    Smoking status: Never     Passive exposure: Past    Smokeless tobacco: Never   Vaping Use    Vaping status: Never Used   Substance and Sexual Activity    Alcohol use: Yes     Alcohol/week: 3.0 standard drinks of alcohol     Types: 2 Cans of beer, 1 Drinks containing 0.5 oz of alcohol per week    Drug use: No    Sexual activity: Yes     Partners: Female     Birth control/protection: None, Vasectomy      Current Outpatient Medications on File Prior to Visit   Medication Sig Dispense Refill    B Complex Vitamins (VITAMIN B COMPLEX PO) Take 1 tablet by mouth Daily.      benzonatate (TESSALON) 200 MG capsule Take 1 capsule by mouth 3 (Three) Times a Day As Needed.      bumetanide (BUMEX) 0.5 MG tablet Take 1 tablet by mouth Daily. 90  tablet 3    cephalexin (KEFLEX) 500 MG capsule Take 4 capsules 1 hour prior to dental procedure 4 capsule 0    cholecalciferol (VITAMIN D3) 1.25 MG (70447 UT) capsule Take 1 capsule by mouth Every 7 (Seven) Days.      Continuous Glucose Sensor (FreeStyle Hebert 3 Plus Sensor)       glucose blood test strip See Admin Instructions.      losartan (COZAAR) 100 MG tablet Take 1 tablet by mouth Daily.      meloxicam (MOBIC) 15 MG tablet Take 1 tablet by mouth Daily As Needed for Moderate Pain or Mild Pain. 30 tablet 0    Multiple Vitamins-Minerals (CENTRUM SILVER 50+MEN) tablet Take 1 tablet by mouth Daily.      rosuvastatin (CRESTOR) 5 MG tablet Take 1 tablet by mouth Daily. 90 tablet 3    triamterene-hydrochlorothiazide (MAXZIDE) 75-50 MG per tablet Take 1 tablet by mouth Daily. 90 tablet 3    Trulicity 4.5 MG/0.5ML solution pen-injector Inject 0.5 mL as directed 1 (One) Time Per Week. Saturday       No current facility-administered medications on file prior to visit.      Allergies   Allergen Reactions    Lisinopril Cough        Review of Systems   Constitutional:  Negative for activity change, appetite change, chills, diaphoresis, fatigue, fever and unexpected weight change.   HENT:  Negative for congestion, dental problem, drooling, ear discharge, ear pain, facial swelling, hearing loss, mouth sores, nosebleeds, postnasal drip, rhinorrhea, sinus pressure, sneezing, sore throat, tinnitus, trouble swallowing and voice change.    Eyes:  Negative for photophobia, pain, discharge, redness, itching and visual disturbance.   Respiratory:  Negative for apnea, cough, choking, chest tightness, shortness of breath, wheezing and stridor.    Cardiovascular:  Negative for chest pain, palpitations and leg swelling.   Gastrointestinal:  Negative for abdominal distention, abdominal pain, anal bleeding, blood in stool, constipation, diarrhea, nausea, rectal pain and vomiting.   Endocrine: Negative for cold intolerance, heat  "intolerance, polydipsia, polyphagia and polyuria.   Genitourinary:  Negative for decreased urine volume, difficulty urinating, dysuria, enuresis, flank pain, frequency, genital sores, hematuria and urgency.   Musculoskeletal:  Positive for arthralgias. Negative for back pain, gait problem, joint swelling, myalgias, neck pain and neck stiffness.   Skin:  Negative for color change, pallor, rash and wound.   Allergic/Immunologic: Negative for environmental allergies, food allergies and immunocompromised state.   Neurological:  Negative for dizziness, tremors, seizures, syncope, facial asymmetry, speech difficulty, weakness, light-headedness, numbness and headaches.   Hematological:  Negative for adenopathy. Does not bruise/bleed easily.   Psychiatric/Behavioral:  Negative for agitation, behavioral problems, confusion, decreased concentration, dysphoric mood, hallucinations, self-injury, sleep disturbance and suicidal ideas. The patient is not nervous/anxious and is not hyperactive.         Objective      Physical Exam  /84   Ht 175.3 cm (69.02\")   Wt (!) 140 kg (309 lb)   BMI 45.61 kg/m²     Body mass index is 45.61 kg/m².         General:   Mental Status:  Alert   Appearance: Cooperative, in no acute distress   Build and Nutrition: Obese by BMI male   Orientation: Alert and oriented to person, place and time   Posture: Normal   Gait: Nonantalgic/normal    Integument:   Right hip: Wound is well-healed with no signs of infection    Lower Extremity:   Right Hip:    Tenderness:  None    Swelling:  None    Crepitus:  None    Range of motion:  External Rotation: 20°       Internal Rotation: 20°       Flexion:  100°       Extension:  0°    Deformities:  None  [facility]    No leg length discrepancy      Imaging/Studies  Imaging Results (Last 24 Hours)       Procedure Component Value Units Date/Time    XR Hip With or Without Pelvis 2 - 3 View Right [[id number]] Resulted: 01/13/25 0839     " Updated: 01/13/25 0839    Narrative:      Right Hip Radiographs  Indication: status-post right total hip arthroplasty  Views: low AP pelvis and lateral of the right hip    Comparison: no change compared to prior study, 1/18/2024 and 4/10/2024    Findings:   No change in alignment of the components, with no signs of loosening or   failure.  Small area of heterotopic ossification adjacent to the greater   trochanter superiorly, nonbridging.                Assessment and Plan     Diagnoses and all orders for this visit:    1. S/P total right hip arthroplasty (Primary)  -     XR Hip With or Without Pelvis 2 - 3 View Right    2. Class 3 severe obesity due to excess calories without serious comorbidity with body mass index (BMI) of 45.0 to 49.9 in adult        1. S/P total right hip arthroplasty    2. Class 3 severe obesity due to excess calories without serious comorbidity with body mass index (BMI) of 45.0 to 49.9 in adult          I reviewed my findings with the patient.  His right total hip arthroplasty is functioning well and he is pleased with the results.  I will see him back in 4 years, for what will be a 5-year checkup with x-rays.  I will see him back sooner for any problems.    Return in about 4 years (around 1/13/2029) for recheck with x-rays.      Shaq High MD  01/13/25  08:43 EST    Dictated Utilizing Dragon Dictation

## 2025-04-21 RX ORDER — ROSUVASTATIN CALCIUM 5 MG/1
5 TABLET, COATED ORAL DAILY
Qty: 30 TABLET | Refills: 0 | Status: SHIPPED | OUTPATIENT
Start: 2025-04-21

## 2025-07-21 DIAGNOSIS — R60.0 LOWER EXTREMITY EDEMA: Primary | ICD-10-CM

## 2025-07-21 RX ORDER — BUMETANIDE 0.5 MG/1
0.5 TABLET ORAL DAILY
Qty: 30 TABLET | Refills: 0 | Status: SHIPPED | OUTPATIENT
Start: 2025-07-21

## 2025-08-05 ENCOUNTER — TELEPHONE (OUTPATIENT)
Facility: HOSPITAL | Age: 65
End: 2025-08-05
Payer: COMMERCIAL

## 2025-08-13 ENCOUNTER — TELEPHONE (OUTPATIENT)
Facility: HOSPITAL | Age: 65
End: 2025-08-13
Payer: COMMERCIAL

## 2025-08-14 ENCOUNTER — TELEPHONE (OUTPATIENT)
Facility: HOSPITAL | Age: 65
End: 2025-08-14
Payer: COMMERCIAL

## (undated) DEVICE — ANTIBACTERIAL UNDYED BRAIDED (POLYGLACTIN 910), SYNTHETIC ABSORBABLE SUTURE: Brand: COATED VICRYL

## (undated) DEVICE — ENDOPATH XCEL BLADELESS TROCARS WITH STABILITY SLEEVES: Brand: ENDOPATH XCEL

## (undated) DEVICE — BLANKT WARM UPPR/BDY ARM/OUT 57X196CM

## (undated) DEVICE — GOWN,NON-REINFORCED,SIRUS,SET IN SLV,XL: Brand: MEDLINE

## (undated) DEVICE — ELECTRD BLD EZ CLN STD 4IN

## (undated) DEVICE — PK HIP TOTL UNIV 10

## (undated) DEVICE — DRAPE,REIN 53X77,STERILE: Brand: MEDLINE

## (undated) DEVICE — GLV SURG SENSICARE MICRO PF LF 7.5 STRL

## (undated) DEVICE — TOWEL,OR,DSP,ST,BLUE,STD,4/PK,20PK/CS: Brand: MEDLINE

## (undated) DEVICE — GLOVE,SURG,SENSICARE,ALOE,LF,PF,9: Brand: MEDLINE

## (undated) DEVICE — SUT PDS MONO 0 36 CT1 VIL PDP346H

## (undated) DEVICE — OBT BLADLES ENDOWRIST DAVINCI/S 8MM

## (undated) DEVICE — TRAP FLD MINIVAC MEGADYNE 100ML

## (undated) DEVICE — GLV SURG SENSICARE PI LF PF 7.0

## (undated) DEVICE — ENDOPOUCH RETRIEVER SPECIMEN RETRIEVAL BAGS: Brand: ENDOPOUCH RETRIEVER

## (undated) DEVICE — TRY EPID SFTY 18G 3.5IN 1T7680

## (undated) DEVICE — 3M™ STERI-DRAPE™ INSTRUMENT POUCH 1018: Brand: STERI-DRAPE™

## (undated) DEVICE — SHT AIR TRANSFR COMFRT GLIDE LAT 40X80IN

## (undated) DEVICE — DRAPE,T,LIMB,BILATERAL,STERILE: Brand: MEDLINE

## (undated) DEVICE — GLV SURG PREMIERPRO MIC LTX PF SZ8.5 BRN

## (undated) DEVICE — TIP COVER ACCESSORY

## (undated) DEVICE — BNDG ELAS CO-FLEX SLF ADHR 6IN 5YD LF STRL

## (undated) DEVICE — PATIENT RETURN ELECTRODE, SINGLE-USE, CONTACT QUALITY MONITORING, ADULT, WITH 9FT CORD, FOR PATIENTS WEIGING OVER 33LBS. (15KG): Brand: MEGADYNE

## (undated) DEVICE — TROC ANCHORPORT BLADELES W/GRIP 12X120MM

## (undated) DEVICE — SUT MNCRYL UROLOGICAL UR6 2/0 27IN Y605H

## (undated) DEVICE — VIOLET POLYDIOXANONE POLYMER, SYNTHETIC ABSORBABLE SUTURE CLIPS: Brand: LAPRA-TY

## (undated) DEVICE — ST TBG AIRSEAL FLTR TRI LUM

## (undated) DEVICE — SUT SILK 2/0 PS 18IN 1588H

## (undated) DEVICE — STOCKINETTE,IMPERVIOUS,12X48,STERILE: Brand: MEDLINE

## (undated) DEVICE — C-ARM DRAPE: Brand: DEROYAL

## (undated) DEVICE — C-ARM: Brand: UNBRANDED

## (undated) DEVICE — TBG PENCL TELESCP MEGADYNE SMOKE EVAC 10FT

## (undated) DEVICE — TAPE,CLOTH/SILK,CURAD,3"X10YD,LF,40/CS: Brand: CURAD

## (undated) DEVICE — GLV SURG SENSICARE PI LF PF 7.5

## (undated) DEVICE — STRYKER PERFORMANCE SERIES SAGITTAL BLADE: Brand: STRYKER PERFORMANCE SERIES

## (undated) DEVICE — UNDRPD COMFRT GLD DRYPAD 36X57IN

## (undated) DEVICE — GLV SURG PREMIERPRO MIC LTX PF SZ7.5 BRN

## (undated) DEVICE — 3M™ MEDIPORE™ H SOFT CLOTH SURGICAL TAPE, 2863, 3 IN X 10 YD, 12/CASE: Brand: 3M™ MEDIPORE™

## (undated) DEVICE — DRAPE,TOP,102X53,STERILE: Brand: MEDLINE

## (undated) DEVICE — LEX PROSTATE ACCESSORY PACK: Brand: MEDLINE INDUSTRIES, INC.

## (undated) DEVICE — PK UROL DAVINCI 10

## (undated) DEVICE — GLV SURG PREMIERPRO MIC LTX PF SZ7 BRN

## (undated) DEVICE — SUT MNCRYL UROLOG UR6 2/0 27IN UNDYED

## (undated) DEVICE — SYS CLS SKIN PREMIERPRO EXOFINFUSION 22CM

## (undated) DEVICE — SUT VIC 0 TIES 18IN J912G

## (undated) DEVICE — SPNG GZ WOVN 4X4IN 12PLY 10/BX STRL

## (undated) DEVICE — GLV SURG PREMIERPRO MIC LTX PF SZ8 BRN

## (undated) DEVICE — Device